# Patient Record
Sex: MALE | Race: ASIAN | NOT HISPANIC OR LATINO | Employment: FULL TIME | URBAN - METROPOLITAN AREA
[De-identification: names, ages, dates, MRNs, and addresses within clinical notes are randomized per-mention and may not be internally consistent; named-entity substitution may affect disease eponyms.]

---

## 2019-02-17 ENCOUNTER — HOSPITAL ENCOUNTER (EMERGENCY)
Facility: HOSPITAL | Age: 29
Discharge: HOME | End: 2019-02-17
Attending: EMERGENCY MEDICINE
Payer: COMMERCIAL

## 2019-02-17 ENCOUNTER — OFFICE VISIT (OUTPATIENT)
Dept: URGENT CARE | Facility: CLINIC | Age: 29
End: 2019-02-17
Payer: COMMERCIAL

## 2019-02-17 VITALS
DIASTOLIC BLOOD PRESSURE: 90 MMHG | HEART RATE: 95 BPM | WEIGHT: 163 LBS | BODY MASS INDEX: 27.16 KG/M2 | TEMPERATURE: 98 F | RESPIRATION RATE: 19 BRPM | SYSTOLIC BLOOD PRESSURE: 137 MMHG | HEIGHT: 65 IN | OXYGEN SATURATION: 95 %

## 2019-02-17 VITALS
SYSTOLIC BLOOD PRESSURE: 113 MMHG | OXYGEN SATURATION: 95 % | DIASTOLIC BLOOD PRESSURE: 63 MMHG | HEART RATE: 91 BPM | RESPIRATION RATE: 18 BRPM | TEMPERATURE: 99 F

## 2019-02-17 DIAGNOSIS — K06.8 GINGIVAL BLEEDING: Primary | ICD-10-CM

## 2019-02-17 LAB
BASOPHILS # BLD AUTO: 0.04 K/UL
BASOPHILS NFR BLD: 0.2 %
DIFFERENTIAL METHOD: ABNORMAL
EOSINOPHIL # BLD AUTO: 0.1 K/UL
EOSINOPHIL NFR BLD: 0.5 %
ERYTHROCYTE [DISTWIDTH] IN BLOOD BY AUTOMATED COUNT: 12.7 %
HCT VFR BLD AUTO: 42.2 %
HGB BLD-MCNC: 14 G/DL
IMM GRANULOCYTES # BLD AUTO: 0.06 K/UL
IMM GRANULOCYTES NFR BLD AUTO: 0.4 %
INR PPP: 1
LYMPHOCYTES # BLD AUTO: 1.7 K/UL
LYMPHOCYTES NFR BLD: 10.1 %
MCH RBC QN AUTO: 29.7 PG
MCHC RBC AUTO-ENTMCNC: 33.2 G/DL
MCV RBC AUTO: 89 FL
MONOCYTES # BLD AUTO: 0.9 K/UL
MONOCYTES NFR BLD: 5.4 %
NEUTROPHILS # BLD AUTO: 13.8 K/UL
NEUTROPHILS NFR BLD: 83.4 %
NRBC BLD-RTO: 0 /100 WBC
PLATELET # BLD AUTO: 274 K/UL
PMV BLD AUTO: 9.9 FL
PROTHROMBIN TIME: 10.5 SEC
RBC # BLD AUTO: 4.72 M/UL
WBC # BLD AUTO: 16.58 K/UL

## 2019-02-17 PROCEDURE — 99284 EMERGENCY DEPT VISIT MOD MDM: CPT | Mod: 25

## 2019-02-17 PROCEDURE — 85025 COMPLETE CBC W/AUTO DIFF WBC: CPT

## 2019-02-17 PROCEDURE — 99284 EMERGENCY DEPT VISIT MOD MDM: CPT | Mod: ,,, | Performed by: EMERGENCY MEDICINE

## 2019-02-17 PROCEDURE — 3008F BODY MASS INDEX DOCD: CPT | Mod: CPTII,S$GLB,, | Performed by: NURSE PRACTITIONER

## 2019-02-17 PROCEDURE — 99284 PR EMERGENCY DEPT VISIT,LEVEL IV: ICD-10-PCS | Mod: ,,, | Performed by: EMERGENCY MEDICINE

## 2019-02-17 PROCEDURE — 96360 HYDRATION IV INFUSION INIT: CPT

## 2019-02-17 PROCEDURE — 3008F PR BODY MASS INDEX (BMI) DOCUMENTED: ICD-10-PCS | Mod: CPTII,S$GLB,, | Performed by: NURSE PRACTITIONER

## 2019-02-17 PROCEDURE — 85610 PROTHROMBIN TIME: CPT

## 2019-02-17 PROCEDURE — 25000003 PHARM REV CODE 250: Performed by: PHYSICIAN ASSISTANT

## 2019-02-17 PROCEDURE — 99203 OFFICE O/P NEW LOW 30 MIN: CPT | Mod: S$GLB,,, | Performed by: NURSE PRACTITIONER

## 2019-02-17 PROCEDURE — 99203 PR OFFICE/OUTPT VISIT, NEW, LEVL III, 30-44 MIN: ICD-10-PCS | Mod: S$GLB,,, | Performed by: NURSE PRACTITIONER

## 2019-02-17 RX ORDER — ACETAMINOPHEN 325 MG/1
650 TABLET ORAL
Status: COMPLETED | OUTPATIENT
Start: 2019-02-17 | End: 2019-02-17

## 2019-02-17 RX ORDER — PENICILLIN V POTASSIUM 250 MG/1
500 TABLET, FILM COATED ORAL
Status: COMPLETED | OUTPATIENT
Start: 2019-02-17 | End: 2019-02-17

## 2019-02-17 RX ORDER — PENICILLIN V POTASSIUM 500 MG/1
500 TABLET, FILM COATED ORAL 4 TIMES DAILY
Qty: 28 TABLET | Refills: 0 | Status: SHIPPED | OUTPATIENT
Start: 2019-02-17 | End: 2019-02-24

## 2019-02-17 RX ORDER — PENICILLIN V POTASSIUM 250 MG/1
250 TABLET, FILM COATED ORAL
Status: DISCONTINUED | OUTPATIENT
Start: 2019-02-17 | End: 2019-02-17

## 2019-02-17 RX ADMIN — PENICILLIN V POTASIUM 500 MG: 250 TABLET ORAL at 05:02

## 2019-02-17 RX ADMIN — ACETAMINOPHEN 650 MG: 325 TABLET ORAL at 05:02

## 2019-02-17 RX ADMIN — SODIUM CHLORIDE 1000 ML: 0.9 INJECTION, SOLUTION INTRAVENOUS at 03:02

## 2019-02-17 NOTE — ED NOTES
LOC: The patient is awake and alert; oriented x 3 and speaking appropriately.  APPEARANCE: Patient resting comfortably, patient is clean and well groomed, gums bleeding bright red blood.   SKIN: warm and dry, normal skin turgor & moist mucus membranes, skin intact, no breakdown noted.  MUSCULOSKELETAL: Patient moving all extremities well, no obvious swelling or deformities noted  RESPIRATORY: Airway is open and patent, ; respirations are spontaneous, normal effort and rate  CARDIAC: Patient has a normal rate, no peripheral edema noted, capillary refill < 3 seconds; No complaints of chest pain   ABDOMEN: Soft and non tender to palpation, no distention noted.

## 2019-02-17 NOTE — PROGRESS NOTES
"Subjective:       Patient ID: Phoenix Jones is a 28 y.o. male.    Vitals:  height is 5' 5" (1.651 m) and weight is 73.9 kg (163 lb). His axillary temperature is 97.5 °F (36.4 °C). His blood pressure is 137/90 (abnormal) and his pulse is 95. His respiration is 19 and oxygen saturation is 95%.     Chief Complaint: Oral Swelling    28 year old male presents to clinic today with complaints gum bleeding. He states that he had his teeth "deep cleaned". Woke up on Saturday with oral bleeding from one tooth that has been uncontrolled. He reports going to  ER last night and states his blood was drawn and told it was normal. He presents today because he is still bleeding. He denies any lightheadedness, dizziness, or feelings of syncope.     He is accompanied by his girlfriend today.     He denies any fever or chills while at home.       Oral Pain    This is a new problem. The current episode started yesterday. The problem occurs constantly. The problem has been gradually worsening. The patient is experiencing no pain. Associated symptoms include oral bleeding. Pertinent negatives include no fever. He has tried nothing for the symptoms. The treatment provided no relief.       Constitution: Negative for chills, fatigue and fever.   HENT: Negative for congestion and sore throat.    Neck: Negative for painful lymph nodes.   Cardiovascular: Negative for chest pain and leg swelling.   Eyes: Negative for double vision and blurred vision.   Respiratory: Negative for cough and shortness of breath.    Gastrointestinal: Negative for nausea, vomiting and diarrhea.   Genitourinary: Negative for dysuria, frequency and urgency.   Musculoskeletal: Negative for joint pain, joint swelling, muscle cramps and muscle ache.   Skin: Negative for color change, pale and rash.   Allergic/Immunologic: Negative for seasonal allergies.   Neurological: Negative for dizziness, history of vertigo, light-headedness, passing out and headaches. "   Hematologic/Lymphatic: Negative for swollen lymph nodes, easy bruising/bleeding and history of blood clots. Does not bruise/bleed easily.   Psychiatric/Behavioral: Negative for nervous/anxious, sleep disturbance and depression. The patient is not nervous/anxious.        Objective:      Physical Exam   Constitutional: He is oriented to person, place, and time. He appears well-developed and well-nourished. He is cooperative.  Non-toxic appearance. He does not appear ill. No distress.   HENT:   Head: Normocephalic and atraumatic.   Right Ear: Hearing, tympanic membrane, external ear and ear canal normal.   Left Ear: Hearing, tympanic membrane, external ear and ear canal normal.   Nose: Nose normal. No mucosal edema, rhinorrhea or nasal deformity. No epistaxis. Right sinus exhibits no maxillary sinus tenderness and no frontal sinus tenderness. Left sinus exhibits no maxillary sinus tenderness and no frontal sinus tenderness.   Mouth/Throat: Uvula is midline, oropharynx is clear and moist and mucous membranes are normal. No trismus in the jaw. Normal dentition. No uvula swelling. No posterior oropharyngeal erythema.       Gingivial swelling with moderate amount of blood in oral cavity. See attached photo.    Eyes: Conjunctivae and lids are normal. Right eye exhibits no discharge. Left eye exhibits no discharge. No scleral icterus.   Sclera clear bilat   Neck: Trachea normal, normal range of motion, full passive range of motion without pain and phonation normal. Neck supple.   Cardiovascular: Normal rate, regular rhythm, normal heart sounds, intact distal pulses and normal pulses.   Pulmonary/Chest: Effort normal and breath sounds normal. No respiratory distress.   Abdominal: Soft. Normal appearance and bowel sounds are normal. He exhibits no distension, no pulsatile midline mass and no mass. There is no tenderness.   Musculoskeletal: Normal range of motion. He exhibits no edema or deformity.   Neurological: He is alert  and oriented to person, place, and time. He exhibits normal muscle tone. Coordination normal.   Skin: Skin is warm, dry and intact. He is not diaphoretic. No pallor.   Psychiatric: He has a normal mood and affect. His speech is normal and behavior is normal. Judgment and thought content normal. Cognition and memory are normal.   Nursing note and vitals reviewed.            Assessment:       1. Gingival bleeding        Plan:           Discussed with patient that if he develops any lightheadedness, dizziness or feelings of syncope he should go immediately to the emergency department   otherwise he needs to follow up with his dentist tomorrow.    Gingival bleeding            Patient Instructions     FOLLOW UP WITH YOUR DENTIST TOMORROW.   AVOID ASPIRIN AND NSAIDS.   DO SALT WATER GARGLES    You must understand that you've received an Urgent Care treatment only and that you may be released before all your medical problems are known or treated. You, the patient, will arrange for follow up care as instructed.  If your condition worsens we recommend that you receive another evaluation at the emergency room immediately or contact your primary medical clinics after hours call service to discuss your concerns.  Please return here or go to the Emergency Department for any concerns or worsening of condition.      Understanding Gingivitis  Gingivitis is a type of gum disease. It is an inflammation of the gums that causes redness and swelling. Its most often caused by infection from bacteria on the teeth. A severe infection can cause small painful sores on the gums, bad breath, and bleeding gums. If untreated, gingivitis can lead to periodontal disease. Over time, this can cause tooth loss.  What causes gingivitis?  Gingivitis is most often caused when plaque builds up on your teeth. Plaque is a sticky film made of bacteria and other substances that coats your teeth. Brushing and flossing helps remove plaque. If you dont brush  and floss regularly, plaque can build up and lead to gingivitis.  You are more likely to have gingivitis if you:  · Dont brush or floss regularly  · Smoke or chew tobacco  · Are pregnant  · Have diabetes  · Use medicines such as birth control pills, steroids, drugs used to treat epilepsy, or cancer drugs  · Have family members who tend to get gingivitis  Signs and symptoms  You may have gingivitis if your gums have areas that:  · Are swollen  · Are bright red or dusky red  · Bleed easily  · Are sore  Treating gingivitis  · See your dentist. He or she may clean your teeth and remove buildup on your teeth of plaque and tartar. Tartar is a mixture of plaque and minerals that forms into a hard substance.  · Use an antiseptic mouth rinse if your dentist tells you to.  · Take antibiotics and other medicines exactly as directed. Dont stop taking them when your symptoms go away.  · Make sure you brush at least twice a day, and use dental floss at least once a day. This will help remove plaque from your teeth.  · Eat a soft diet, if needed, to ease discomfort. Avoid food and beverages that may cause more discomfort in your gums. These include citrus juices such as orange juice and lemonade, and salty or spicy foods.  · Use acetaminophen or ibuprofen for pain or fever. If you have liver or kidney disease or ever had a stomach ulcer or gastrointestinaI bleeding, talk with your healthcare provider before using these medicines.  When to call the healthcare provider  Call your healthcare provider if you have:  · Pain that doesnt go away or gets worse  · Gums that have pulled away from your teeth  · A bad taste in your mouth that wont go away  · Teeth that become loose  · Inability to eat or drink because of mouth pain      Date Last Reviewed: 9/19/2015  © 4642-5895 [employer]. [address] [place] [address], [place] [address]. All rights reserved. This information is not intended as a substitute for professional  medical care. Always follow your healthcare professional's instructions.

## 2019-02-17 NOTE — ED TRIAGE NOTES
Had a dental cleaning  2 days ago and gums continue to bleed. Denies rectal bleeding. Feels weak. Bleeding began the day after the cleaning. Seen at [facility] ER  Last night. STates his labs were fine and he was given a shot in his  gum . Given a packet of hemostat to apply to his gums but states it is not working.

## 2019-02-17 NOTE — PATIENT INSTRUCTIONS
FOLLOW UP WITH YOUR DENTIST TOMORROW.   AVOID ASPIRIN AND NSAIDS.   DO SALT WATER GARGLES    You must understand that you've received an Urgent Care treatment only and that you may be released before all your medical problems are known or treated. You, the patient, will arrange for follow up care as instructed.  If your condition worsens we recommend that you receive another evaluation at the emergency room immediately or contact your primary medical clinics after hours call service to discuss your concerns.  Please return here or go to the Emergency Department for any concerns or worsening of condition.      Understanding Gingivitis  Gingivitis is a type of gum disease. It is an inflammation of the gums that causes redness and swelling. Its most often caused by infection from bacteria on the teeth. A severe infection can cause small painful sores on the gums, bad breath, and bleeding gums. If untreated, gingivitis can lead to periodontal disease. Over time, this can cause tooth loss.  What causes gingivitis?  Gingivitis is most often caused when plaque builds up on your teeth. Plaque is a sticky film made of bacteria and other substances that coats your teeth. Brushing and flossing helps remove plaque. If you dont brush and floss regularly, plaque can build up and lead to gingivitis.  You are more likely to have gingivitis if you:  · Dont brush or floss regularly  · Smoke or chew tobacco  · Are pregnant  · Have diabetes  · Use medicines such as birth control pills, steroids, drugs used to treat epilepsy, or cancer drugs  · Have family members who tend to get gingivitis  Signs and symptoms  You may have gingivitis if your gums have areas that:  · Are swollen  · Are bright red or dusky red  · Bleed easily  · Are sore  Treating gingivitis  · See your dentist. He or she may clean your teeth and remove buildup on your teeth of plaque and tartar. Tartar is a mixture of plaque and minerals that forms into a hard  substance.  · Use an antiseptic mouth rinse if your dentist tells you to.  · Take antibiotics and other medicines exactly as directed. Dont stop taking them when your symptoms go away.  · Make sure you brush at least twice a day, and use dental floss at least once a day. This will help remove plaque from your teeth.  · Eat a soft diet, if needed, to ease discomfort. Avoid food and beverages that may cause more discomfort in your gums. These include citrus juices such as orange juice and lemonade, and salty or spicy foods.  · Use acetaminophen or ibuprofen for pain or fever. If you have liver or kidney disease or ever had a stomach ulcer or gastrointestinaI bleeding, talk with your healthcare provider before using these medicines.  When to call the healthcare provider  Call your healthcare provider if you have:  · Pain that doesnt go away or gets worse  · Gums that have pulled away from your teeth  · A bad taste in your mouth that wont go away  · Teeth that become loose  · Inability to eat or drink because of mouth pain      Date Last Reviewed: 9/19/2015 © 2000-2017 [employer]. [address] [place] [address], [place] [address]. All rights reserved. This information is not intended as a substitute for professional medical care. Always follow your healthcare professional's instructions.

## 2019-02-17 NOTE — ED PROVIDER NOTES
Encounter Date: 2/17/2019       History     Chief Complaint   Patient presents with    Dental Problem     Pt states that he had teeth cleaned on Friday.  Area has been bleeding since.  Pt was seen at  yesterday.     This is a 28-year-old male with no known significant past medical history who presents to the ED with a chief complaint of gingival bleeding following a dental procedure.  Patient states that he went for a deep cleaning 2 days ago.  After he was discharged from the dental office he developed gingival bleeding.  He presented to an outside hospital and was treated with an injection, which he says initially stopped the bleeding.  About an hour and a half after he returned home the bleeding recurred and has been persistent over the last 24 hr.  Patient is feeling generally weak.  He denies any prior history of bleeding disorders, hemarthrosis, epistaxis, hematuria or BRBPR. He is not on anticoagulants.           Review of patient's allergies indicates:  No Known Allergies  History reviewed. No pertinent past medical history.  History reviewed. No pertinent surgical history.  History reviewed. No pertinent family history.  Social History     Tobacco Use    Smoking status: Never Smoker    Smokeless tobacco: Never Used   Substance Use Topics    Alcohol use: No     Frequency: Never    Drug use: No     Review of Systems   Constitutional: Negative for chills and fever.   HENT: Positive for dental problem. Negative for sore throat.    Respiratory: Negative for shortness of breath.    Cardiovascular: Negative for chest pain.   Gastrointestinal: Negative for nausea and vomiting.   Genitourinary: Negative for dysuria.   Musculoskeletal: Negative for back pain.   Skin: Negative for rash.   Neurological: Negative for weakness.   Hematological: Bruises/bleeds easily.       Physical Exam     Initial Vitals [02/17/19 1246]   BP Pulse Resp Temp SpO2   (!) 182/78 82 18 100 °F (37.8 °C) 98 %      MAP       --          Physical Exam    Constitutional: He appears well-developed and well-nourished. No distress.   HENT:   Head: Atraumatic.   Mouth/Throat:       Eyes: Conjunctivae and EOM are normal. Pupils are equal, round, and reactive to light.   Cardiovascular: Normal rate, regular rhythm and normal heart sounds.   Pulmonary/Chest: Breath sounds normal. No respiratory distress. He has no wheezes. He has no rhonchi.   Abdominal: Soft. Bowel sounds are normal. There is no tenderness.   Neurological: He is alert and oriented to person, place, and time.   Skin: Skin is warm and dry. No rash noted.         ED Course   Procedures  Labs Reviewed   CBC W/ AUTO DIFFERENTIAL - Abnormal; Notable for the following components:       Result Value    WBC 16.58 (*)     Gran # (ANC) 13.8 (*)     Immature Grans (Abs) 0.06 (*)     Gran% 83.4 (*)     Lymph% 10.1 (*)     All other components within normal limits   PROTIME-INR          Imaging Results    None                APC / Resident Notes:   28 year old male presenting with gingival bleeding following dental cleaning.  On exam he has focal bleeding from the gingiva, as noted. Initially pressure was held to the area x 10 minutes without significant improvement.  A hemostatic dressing was packed into the area of concern, and pressure was held again for an additional 10 minutes. We were able to achieve hemostasis. The patient's vitals are hemodynamically stable and his H&H is within normal limits. He developed a fever and was noted to have leukocytosis on labs. He will be started on antibiotics to cover developing dental infection. No appreciable abscesses on exam. Patient understands the importance of prompt f/u with his dentist. There is no active bleeding on exam, vitals are stable. Discussed soft diet. He is stable for discharge. I discussed the care of this patient with my supervising MD.          Attending Attestation:     Physician Attestation Statement for NP/PA:   I have conducted a  face to face encounter with this patient in addition to the NP/PA, due to Medical Complexity    Other NP/PA Attestation Additions:    History of Present Illness: 29 y/o healthy M with c/o oral bleeding since dental cleaning yesterday. Seen at OSH and by report tx with block at OSH yesterday but continued bleeding.   No h/o easy bruising or other c/o easy bruising or bleeding.   Physical Exam: Minimal oozing from inner side of tooth 11, no swelling, no erythema   Medical Decision Making: Compression and dental hemcon  reassess                    Clinical Impression:   The encounter diagnosis was Gingival bleeding.      Disposition:   Disposition: Discharged  Condition: Stable                        Britt Cook PA-C  02/17/19 2100       Ivana Nation MD  02/18/19 [id number]

## 2020-05-11 ENCOUNTER — HOSPITAL ENCOUNTER (EMERGENCY)
Facility: HOSPITAL | Age: 30
Discharge: HOME | End: 2020-05-11
Attending: EMERGENCY MEDICINE
Payer: COMMERCIAL

## 2020-05-11 VITALS
OXYGEN SATURATION: 98 % | BODY MASS INDEX: 30.02 KG/M2 | HEART RATE: 87 BPM | RESPIRATION RATE: 18 BRPM | DIASTOLIC BLOOD PRESSURE: 85 MMHG | WEIGHT: 159 LBS | TEMPERATURE: 99 F | HEIGHT: 61 IN | SYSTOLIC BLOOD PRESSURE: 127 MMHG

## 2020-05-11 DIAGNOSIS — R53.1 WEAKNESS: Primary | ICD-10-CM

## 2020-05-11 LAB
ALBUMIN SERPL BCP-MCNC: 4.9 G/DL (ref 3.5–5.2)
ALP SERPL-CCNC: 77 U/L (ref 55–135)
ALT SERPL W/O P-5'-P-CCNC: 42 U/L (ref 10–44)
ANION GAP SERPL CALC-SCNC: 12 MMOL/L (ref 8–16)
AST SERPL-CCNC: 25 U/L (ref 10–40)
BASOPHILS # BLD AUTO: 0.02 K/UL (ref 0–0.2)
BASOPHILS NFR BLD: 0.2 % (ref 0–1.9)
BILIRUB SERPL-MCNC: 0.6 MG/DL (ref 0.1–1)
BUN SERPL-MCNC: 16 MG/DL (ref 6–20)
BUN SERPL-MCNC: 18 MG/DL (ref 6–30)
CALCIUM SERPL-MCNC: 9.6 MG/DL (ref 8.7–10.5)
CHLORIDE SERPL-SCNC: 101 MMOL/L (ref 95–110)
CHLORIDE SERPL-SCNC: 102 MMOL/L (ref 95–110)
CO2 SERPL-SCNC: 23 MMOL/L (ref 23–29)
CREAT SERPL-MCNC: 0.9 MG/DL (ref 0.5–1.4)
CREAT SERPL-MCNC: 0.9 MG/DL (ref 0.5–1.4)
DIFFERENTIAL METHOD: NORMAL
EOSINOPHIL # BLD AUTO: 0.2 K/UL (ref 0–0.5)
EOSINOPHIL NFR BLD: 1.9 % (ref 0–8)
ERYTHROCYTE [DISTWIDTH] IN BLOOD BY AUTOMATED COUNT: 12.7 % (ref 11.5–14.5)
EST. GFR  (AFRICAN AMERICAN): >60 ML/MIN/1.73 M^2
EST. GFR  (NON AFRICAN AMERICAN): >60 ML/MIN/1.73 M^2
GLUCOSE SERPL-MCNC: 100 MG/DL (ref 70–110)
GLUCOSE SERPL-MCNC: 95 MG/DL (ref 70–110)
HCT VFR BLD AUTO: 47.9 % (ref 40–54)
HCT VFR BLD CALC: 48 %PCV (ref 36–54)
HGB BLD-MCNC: 15.6 G/DL (ref 14–18)
IMM GRANULOCYTES # BLD AUTO: 0.02 K/UL (ref 0–0.04)
IMM GRANULOCYTES NFR BLD AUTO: 0.2 % (ref 0–0.5)
LIPASE SERPL-CCNC: 24 U/L (ref 4–60)
LYMPHOCYTES # BLD AUTO: 2.9 K/UL (ref 1–4.8)
LYMPHOCYTES NFR BLD: 32.2 % (ref 18–48)
MAGNESIUM SERPL-MCNC: 2.2 MG/DL (ref 1.6–2.6)
MCH RBC QN AUTO: 29.4 PG (ref 27–31)
MCHC RBC AUTO-ENTMCNC: 32.6 G/DL (ref 32–36)
MCV RBC AUTO: 90 FL (ref 82–98)
MONOCYTES # BLD AUTO: 0.5 K/UL (ref 0.3–1)
MONOCYTES NFR BLD: 5.8 % (ref 4–15)
NEUTROPHILS # BLD AUTO: 5.3 K/UL (ref 1.8–7.7)
NEUTROPHILS NFR BLD: 59.7 % (ref 38–73)
NRBC BLD-RTO: 0 /100 WBC
PLATELET # BLD AUTO: 291 K/UL (ref 150–350)
PMV BLD AUTO: 10 FL (ref 9.2–12.9)
POC IONIZED CALCIUM: 1.16 MMOL/L (ref 1.06–1.42)
POC TCO2 (MEASURED): 26 MMOL/L (ref 23–29)
POTASSIUM BLD-SCNC: 3.5 MMOL/L (ref 3.5–5.1)
POTASSIUM SERPL-SCNC: 3.5 MMOL/L (ref 3.5–5.1)
PROT SERPL-MCNC: 9 G/DL (ref 6–8.4)
RBC # BLD AUTO: 5.31 M/UL (ref 4.6–6.2)
SAMPLE: NORMAL
SARS-COV-2 RDRP RESP QL NAA+PROBE: NEGATIVE
SODIUM BLD-SCNC: 140 MMOL/L (ref 136–145)
SODIUM SERPL-SCNC: 137 MMOL/L (ref 136–145)
TROPONIN I SERPL DL<=0.01 NG/ML-MCNC: <0.006 NG/ML (ref 0–0.03)
WBC # BLD AUTO: 8.86 K/UL (ref 3.9–12.7)

## 2020-05-11 PROCEDURE — 93005 ELECTROCARDIOGRAM TRACING: CPT

## 2020-05-11 PROCEDURE — 84484 ASSAY OF TROPONIN QUANT: CPT

## 2020-05-11 PROCEDURE — 25000003 PHARM REV CODE 250: Performed by: PHYSICIAN ASSISTANT

## 2020-05-11 PROCEDURE — U0002 COVID-19 LAB TEST NON-CDC: HCPCS

## 2020-05-11 PROCEDURE — 93010 ELECTROCARDIOGRAM REPORT: CPT | Mod: ,,, | Performed by: INTERNAL MEDICINE

## 2020-05-11 PROCEDURE — 83690 ASSAY OF LIPASE: CPT

## 2020-05-11 PROCEDURE — 85025 COMPLETE CBC W/AUTO DIFF WBC: CPT

## 2020-05-11 PROCEDURE — 96360 HYDRATION IV INFUSION INIT: CPT

## 2020-05-11 PROCEDURE — 83735 ASSAY OF MAGNESIUM: CPT

## 2020-05-11 PROCEDURE — 93010 EKG 12-LEAD: ICD-10-PCS | Mod: ,,, | Performed by: INTERNAL MEDICINE

## 2020-05-11 PROCEDURE — 80047 BASIC METABLC PNL IONIZED CA: CPT

## 2020-05-11 PROCEDURE — 80053 COMPREHEN METABOLIC PANEL: CPT

## 2020-05-11 PROCEDURE — 99285 PR EMERGENCY DEPT VISIT,LEVEL V: ICD-10-PCS | Mod: ,,, | Performed by: PHYSICIAN ASSISTANT

## 2020-05-11 PROCEDURE — 99285 EMERGENCY DEPT VISIT HI MDM: CPT | Mod: ,,, | Performed by: PHYSICIAN ASSISTANT

## 2020-05-11 PROCEDURE — 99285 EMERGENCY DEPT VISIT HI MDM: CPT | Mod: 25

## 2020-05-11 RX ORDER — ACETAMINOPHEN 500 MG
1000 TABLET ORAL
Status: COMPLETED | OUTPATIENT
Start: 2020-05-11 | End: 2020-05-11

## 2020-05-11 RX ORDER — ONDANSETRON 4 MG/1
4 TABLET, ORALLY DISINTEGRATING ORAL
Status: COMPLETED | OUTPATIENT
Start: 2020-05-11 | End: 2020-05-11

## 2020-05-11 RX ADMIN — ACETAMINOPHEN 1000 MG: 500 TABLET ORAL at 02:05

## 2020-05-11 RX ADMIN — ONDANSETRON 4 MG: 4 TABLET, ORALLY DISINTEGRATING ORAL at 02:05

## 2020-05-11 RX ADMIN — SODIUM CHLORIDE 1000 ML: 0.9 INJECTION, SOLUTION INTRAVENOUS at 02:05

## 2020-05-11 NOTE — ED PROVIDER NOTES
Encounter Date: 5/11/2020       History     Chief Complaint   Patient presents with    Multiple Complaints     over the past 3-4 days been having dizziness and weakness, nausea beginning today with some CP     Patient is a 29 year old male with no significant PMHx. He presents to the ED for weakness. He reports having generalized weakness for approximately five days. Reports associated dizziness, nausea, and chest pain. Describes chest pain as intermittent and pressure. Denies pain at this time. Reports pain worse with eating. Denies hx of MI or cardiac stents. Denies hx of anticoagulation. He denies fever,chills, vomiting, sob, abd pain, dysuria, diarrhea, or constipation. He is a current everyday smoker and denies alcohol use.    The history is provided by the patient and medical records. The history is limited by a language barrier (patient prefers to speak Mandarin. ). No  was used (patient refuses  at this time. patient insists to proceed with English).     Review of patient's allergies indicates:  No Known Allergies  History reviewed. No pertinent past medical history.  History reviewed. No pertinent surgical history.  History reviewed. No pertinent family history.  Social History     Tobacco Use    Smoking status: Light Tobacco Smoker    Smokeless tobacco: Never Used   Substance Use Topics    Alcohol use: No     Frequency: Never     Drinks per session: Patient refused     Binge frequency: Never    Drug use: No     Review of Systems   Constitutional: Negative for fever.   HENT: Negative for sore throat.    Respiratory: Negative for shortness of breath.    Cardiovascular: Positive for chest pain.   Gastrointestinal: Positive for nausea. Negative for abdominal pain and vomiting.   Genitourinary: Negative for dysuria.   Musculoskeletal: Negative for back pain.   Skin: Negative for rash.   Neurological: Positive for dizziness and weakness.   Hematological: Does not bruise/bleed  easily.       Physical Exam     Initial Vitals [05/11/20 0202]   BP Pulse Resp Temp SpO2   (!) 147/88 95 16 98.8 °F (37.1 °C) 98 %      MAP       --         Physical Exam    Vitals reviewed.  Constitutional: He appears well-developed and well-nourished. No distress.   HENT:   Head: Normocephalic.   Eyes: Conjunctivae and EOM are normal. Pupils are equal, round, and reactive to light.   Neck: Normal range of motion.   Cardiovascular: Normal rate and regular rhythm.   No murmur heard.  Pulmonary/Chest: Breath sounds normal. No respiratory distress. He has no wheezes. He has no rales.   Abdominal: Soft. Bowel sounds are normal. He exhibits no distension. There is no tenderness.   Musculoskeletal: Normal range of motion.   Neurological: He is alert and oriented to person, place, and time. He has normal strength. No sensory deficit. Coordination and gait normal.   Motor strength of b/l UE and LE 5/5. Finger to nose normal. Heel to shin normal. No Pronator drift. No facial droop or asymmetry. Speech is clear. Tongue protrudes midline with no fasciculations.   Skin: Skin is warm and dry. No erythema.         ED Course   Procedures  Labs Reviewed   COMPREHENSIVE METABOLIC PANEL - Abnormal; Notable for the following components:       Result Value    Total Protein 9.0 (*)     All other components within normal limits   SARS-COV-2 RNA AMPLIFICATION, QUAL    Narrative:     What symptom criteria does the patient meet?->Muscle pain   CBC W/ AUTO DIFFERENTIAL   LIPASE   TROPONIN I   MAGNESIUM   ISTAT PROCEDURE        ECG Results          EKG 12-lead (In process)  Result time 05/11/20 07:47:59    In process by Interface, Lab In [facility] (05/11/20 07:47:59)                 Narrative:    Test Reason : R53.1,    Vent. Rate : 082 BPM     Atrial Rate : 082 BPM     P-R Int : 128 ms          QRS Dur : 104 ms      QT Int : 372 ms       P-R-T Axes : 064 062 040 degrees     QTc Int : 434 ms    Normal sinus rhythm  Normal ECG  No previous ECGs  available    Referred By: [email or web address]   SELF           Confirmed By:                             Imaging Results          X-Ray Chest AP Portable (Final result)  Result time 05/11/20 02:59:21    Final result by Wolfgang Light MD (05/11/20 02:59:21)                 Impression:      No acute findings in the chest.      Electronically signed by: Wolfgang Light MD  Date:    05/11/2020  Time:    02:59             Narrative:    EXAMINATION:  XR CHEST AP PORTABLE    CLINICAL HISTORY:  Suspected Covid-19 Virus Infection;    TECHNIQUE:  Single frontal view of the chest was performed.    COMPARISON:  None    FINDINGS:  No consolidation, pleural effusion or pneumothorax.    Cardiomediastinal silhouette is unremarkable.                                 Medical Decision Making:   History:   Old Medical Records: I decided to obtain old medical records.  Independently Interpreted Test(s):   I have ordered and independently interpreted X-rays - see summary below.  Clinical Tests:   Lab Tests: Ordered and Reviewed  Radiological Study: Ordered and Reviewed  Medical Tests: Ordered and Reviewed       APC / Resident Notes:   Patient is a 29 year old male presents to the ED for emergent evaluation of weakness.     Will order labs and imaging. Will order antiemetic and pain medication for symptomatic relief. Will continue to monitor.     Differential diagnoses include, but are not limited to: COVID, peptic ulcer disease, pancreatitis, cardiac arrhythmia, or electrolyte imbalance.     Patient evaluated during the corona virus pandemic. They are COVID negative. No leukocytosis. hemodynamically stable, without increased work of breathing. Troponin WNL CXR found to have no acute findings in the chest. Patient was counseled regarding respiratory supportive care measures. Patient advised to self quarantine, wear mask in public. I have discussed emergency department findings, and plan with the patient. Will discharge home with F/U with PCP.  Patient verbalizes understanding of plan and agrees. Return precautions given.    I have discussed and reviewed with my supervising physician.        Clinical Impression:       ICD-10-CM ICD-9-CM   1. Weakness R53.1 780.79         Disposition:   Disposition: Discharged  Condition: Stable     ED Disposition Condition    Discharge Stable        ED Prescriptions     None        Follow-up Information     Follow up With Specialties Details Why Contact Info Additional Information    [facility] - Internal Medicine Internal Medicine Schedule an appointment as soon as possible for a visit   [address]  [facility] [address] [phone] [facility] Bldg.                                     Felicia Yoder PA-C  05/11/20 0800

## 2020-05-11 NOTE — ED TRIAGE NOTES
Patient complaining of bilateral arm weakness x3-4 days, dizziness, nausea, and chest pressure. Denies SOB, fevers, cough, v/d.

## 2020-07-08 ENCOUNTER — HOSPITAL ENCOUNTER (EMERGENCY)
Facility: HOSPITAL | Age: 30
Discharge: HOME | End: 2020-07-08
Attending: EMERGENCY MEDICINE
Payer: COMMERCIAL

## 2020-07-08 VITALS
DIASTOLIC BLOOD PRESSURE: 86 MMHG | HEART RATE: 85 BPM | SYSTOLIC BLOOD PRESSURE: 139 MMHG | HEIGHT: 62 IN | BODY MASS INDEX: 29.08 KG/M2 | TEMPERATURE: 98 F | OXYGEN SATURATION: 96 % | WEIGHT: 158 LBS | RESPIRATION RATE: 18 BRPM

## 2020-07-08 DIAGNOSIS — F41.9 ANXIETY: ICD-10-CM

## 2020-07-08 PROCEDURE — 93010 EKG 12-LEAD: ICD-10-PCS | Mod: ,,, | Performed by: INTERNAL MEDICINE

## 2020-07-08 PROCEDURE — 93005 ELECTROCARDIOGRAM TRACING: CPT

## 2020-07-08 PROCEDURE — 99283 EMERGENCY DEPT VISIT LOW MDM: CPT | Mod: 25

## 2020-07-08 PROCEDURE — 25000003 PHARM REV CODE 250: Performed by: PHYSICIAN ASSISTANT

## 2020-07-08 PROCEDURE — 99284 PR EMERGENCY DEPT VISIT,LEVEL IV: ICD-10-PCS | Mod: ,,, | Performed by: PHYSICIAN ASSISTANT

## 2020-07-08 PROCEDURE — 93010 ELECTROCARDIOGRAM REPORT: CPT | Mod: ,,, | Performed by: INTERNAL MEDICINE

## 2020-07-08 PROCEDURE — 99284 EMERGENCY DEPT VISIT MOD MDM: CPT | Mod: ,,, | Performed by: PHYSICIAN ASSISTANT

## 2020-07-08 RX ORDER — ALPRAZOLAM 0.5 MG/1
0.5 TABLET ORAL
Status: COMPLETED | OUTPATIENT
Start: 2020-07-08 | End: 2020-07-08

## 2020-07-08 RX ORDER — ALPRAZOLAM 0.5 MG/1
0.5 TABLET ORAL 3 TIMES DAILY PRN
Qty: 7 TABLET | Refills: 0 | Status: SHIPPED | OUTPATIENT
Start: 2020-07-08 | End: 2020-11-25

## 2020-07-08 RX ADMIN — ALPRAZOLAM 0.5 MG: 0.5 TABLET ORAL at 04:07

## 2020-07-08 NOTE — ED PROVIDER NOTES
Encounter Date: 7/8/2020       History     Chief Complaint   Patient presents with    Chills     Pt states feeling chills and unable to sleep tonight. Pt states waking up at 2am feeling anxious and nervous. Pt came in with similar problem a month ago but has not resolved.     30-year-old male presents to the ER complaining of insomnia, nausea anxiety.  Patient speaks broken English, primary language is Mandarin.  The patient refuses to use language line  or ARNAUD.  I am able to communicate but the patient but he has to use translation on his phone.    He is otherwise healthy young male.  He presents to the ER stating cannot sleep tonight.  He endorses mild nausea.  He denies any pain.  He reports tightness in his chest pain.  He reports anxiety and stress.  He has never experienced this before.  He was seen here last month with chest pain but states that was different.  He has no pain tonight.  He appears well, nontoxic and nondistressed.        Review of patient's allergies indicates:  No Known Allergies  History reviewed. No pertinent past medical history.  History reviewed. No pertinent surgical history.  History reviewed. No pertinent family history.  Social History     Tobacco Use    Smoking status: Light Tobacco Smoker    Smokeless tobacco: Never Used   Substance Use Topics    Alcohol use: No     Frequency: Never     Drinks per session: Patient refused     Binge frequency: Never    Drug use: No     Review of Systems   Constitutional: Negative for fever.   HENT: Negative for sore throat.    Respiratory: Negative for shortness of breath.    Cardiovascular: Negative for chest pain.   Gastrointestinal: Negative for nausea.   Genitourinary: Negative for dysuria.   Musculoskeletal: Negative for back pain.   Skin: Negative for rash.   Neurological: Negative for weakness.   Hematological: Does not bruise/bleed easily.   Psychiatric/Behavioral:        Anxiety, nausea       Physical Exam     Initial  Vitals [07/08/20 0359]   BP Pulse Resp Temp SpO2   139/86 85 18 98.4 °F (36.9 °C) 96 %      MAP       --         Physical Exam    Constitutional: Vital signs are normal. He appears well-developed and well-nourished.   HENT:   Head: Normocephalic and atraumatic.   Right Ear: Hearing normal.   Left Ear: Hearing normal.   Eyes: Conjunctivae are normal.   Cardiovascular: Normal rate and regular rhythm.   Abdominal: Soft. Normal appearance and bowel sounds are normal.   Musculoskeletal: Normal range of motion.   Neurological: He is alert and oriented to person, place, and time.   Skin: Skin is warm and intact.   Psychiatric: He has a normal mood and affect. His speech is normal and behavior is normal. Cognition and memory are normal.         ED Course   Procedures  Labs Reviewed - No data to display  EKG Readings: (Independently Interpreted)   Normal sinus rhythm, rate 71 no STEMI       Imaging Results    None          Medical Decision Making:   History:   Old Medical Records: I decided to obtain old medical records.  Old Records Summarized: records from clinic visits.  Initial Assessment:   Otherwise healthy 30-year-old male presenting to the ER with anxiety  Differential Diagnosis:   Anxiety, stress, arrhythmia, insomnia  ED Management:  Twelve lead EKG without acute ischemic findings  Otherwise healthy young male with no significant past medical history presenting with symptoms of anxiety  His EKG was nonischemic.  His history is not consistent with cardiac etiology.  I do suspect that her symptoms are secondary to stress, anxiety  Patient advised to limit the use of caffeine late in the evening.  He was offered medication in the ED but did not want to take anything.  He was given a prescription to use p.r.n. and a referral was placed for primary care                                   Clinical Impression:       ICD-10-CM ICD-9-CM   1. Anxiety  F41.9 300.00         Disposition:   Disposition: Discharged  Condition:  Stable     ED Disposition Condition    Discharge Stable        ED Prescriptions     Medication Sig Dispense Start Date End Date Auth. Provider    ALPRAZolam (XANAX) 0.5 MG tablet Take 1 tablet (0.5 mg total) by mouth 3 (three) times daily as needed for Anxiety. 7 tablet 7/8/2020 8/7/2020 Dusty Vallejo PA-C        Follow-up Information     Follow up With Specialties Details Why Contact Info Additional Information    [facility] - Internal Medicine Internal Medicine   [address] [facility] [address] [phone] [facility] B[facility].                                     Dusty Vallejo PA-C  07/08/20 0435

## 2020-11-25 ENCOUNTER — OFFICE VISIT (OUTPATIENT)
Dept: INTERNAL MEDICINE | Facility: CLINIC | Age: 30
End: 2020-11-25
Payer: COMMERCIAL

## 2020-11-25 VITALS
DIASTOLIC BLOOD PRESSURE: 72 MMHG | TEMPERATURE: 98 F | OXYGEN SATURATION: 98 % | WEIGHT: 159.19 LBS | SYSTOLIC BLOOD PRESSURE: 124 MMHG | BODY MASS INDEX: 29.3 KG/M2 | HEART RATE: 84 BPM | HEIGHT: 62 IN | RESPIRATION RATE: 18 BRPM

## 2020-11-25 DIAGNOSIS — Z00.00 WELL ADULT EXAM: Primary | ICD-10-CM

## 2020-11-25 DIAGNOSIS — R53.83 FATIGUE, UNSPECIFIED TYPE: ICD-10-CM

## 2020-11-25 DIAGNOSIS — G47.30 SLEEP APNEA, UNSPECIFIED TYPE: ICD-10-CM

## 2020-11-25 PROCEDURE — 99385 PREV VISIT NEW AGE 18-39: CPT | Mod: S$GLB,,, | Performed by: FAMILY MEDICINE

## 2020-11-25 PROCEDURE — 99385 PR PREVENTIVE VISIT,NEW,18-39: ICD-10-PCS | Mod: S$GLB,,, | Performed by: FAMILY MEDICINE

## 2020-11-25 PROCEDURE — 99999 PR PBB SHADOW E&M-EST. PATIENT-LVL IV: CPT | Mod: PBBFAC,,, | Performed by: FAMILY MEDICINE

## 2020-11-25 PROCEDURE — 99999 PR PBB SHADOW E&M-EST. PATIENT-LVL IV: ICD-10-PCS | Mod: PBBFAC,,, | Performed by: FAMILY MEDICINE

## 2020-11-25 NOTE — PATIENT INSTRUCTIONS
Obstructive Sleep Apnea  Obstructive sleep apnea is a condition that causes your air passages to become narrowed or blocked during sleep. As a result, breathing stops for short periods. Your body wakes up enough for breathing to begin again, though you don't remember it. The cycle of stopped breathing and brief awakenings can repeat dozens of times a night. This prevents the body from getting to the deeper stages of sleep that are needed for good rest and may cause your body's oxygen level to fall.  Signs of sleep apnea include loud snoring, noisy breathing, and gasping sounds during sleep. Daytime symptoms include waking up tired after a full night's sleep, waking up with headaches, feeling very sleepy or falling asleep during the day, and having problems with memory or concentration.  Risk factors for sleep apnea include:  · Being overweight  · Being a man, or a woman in menopause  · Smoking  · Using alcohol or sedating medicines  · Having enlarged structures in the nose or throat  Home care  Lifestyle changes that can help treat snoring and sleep apnea include the following:  · If you are overweight, lose weight. Talk to your healthcare provider about a weight-loss plan for you.  · Avoid alcohol for 3 to 4 hours before bedtime. Avoid sedating medications. Ask your healthcare provider about the medicines you take.  · If you smoke, talk to your healthcare provider about ways to quit.  · Sleep on your side. This can help prevent gravity from pulling relaxed throat tissues into your breathing passages.  · If you have allergies or sinus problems that block your nose, ask your healthcare provider for help.  Follow-up care  Follow up with your healthcare provider, or as advised. A diagnosis of sleep apnea is made with a sleep study. Your healthcare provider can tell you more about this test.  When to seek medical advice  Sleep apnea can make you more likely to have certain health problems. These include high blood  pressure, heart attack, stroke, and sexual dysfunction. If you have sleep apnea, talk to your healthcare provider about the best treatments for you.  Date Last Reviewed: 4/1/2017 © 2000-2017 [employer]. [address] [place] [address], [place] [address]. All rights reserved. This information is not intended as a substitute for professional medical care. Always follow your healthcare professional's instructions.        Understanding Cervical Strain    There are 7 bones (vertebrae) in the neck that are part of the spine. These are called the cervical spine. Cervical strain is a medical term for neck pain. The neck has several layers of muscles. These are connected with tendons to the cervical spine and other bones. Neck pain is often the result of injury to these muscles and tendons.  Causes of cervical strain  Different types of stress on the neck can damage muscles and tendons (soft tissues) and cause cervical strain. Cervical tissues can be damaged by:  · The neck being forced past its normal range of motion, such as in a car accident or sports injury  · Constant, low-level stress, such as from poor posture or a poorly set-up workspace  Symptoms of cervical strain  These may include:  · Neck pain or stiffness  · Pain in the shoulders or upper back  · Muscle spasms  · Headache, often starting at the base of the neck  · Irritability, difficulty concentrating, or sleeplessness  Treatment for cervical strain  This problem often gets better on its own. Treatments aim to reduce pain and inflammation and increase the range of motion of the neck. Possible treatments include:  · Over-the-counter or prescription pain medicine. These help relieve pain and inflammation.  · Stretching exercises to decrease neck stiffness.  · Massage to decrease neck stiffness.  · Cold or heat pack. These help reduce pain and swelling.  Call 911  Call emergency services right away if you have any of these:  · Face drooping or  numbness  · Numbness or weakness, especially in the arms or on one side  · Slurred speech or difficulty speaking  · Blurred vision   When to call your healthcare provider  Call your healthcare provider right away if you have any of these:  · Fever of 100.4°F (38°C) or higher, or as directed  · Pain or stiffness that gets worse  · Symptoms that dont get better, or get worse  · Numbness, tingling, weakness or shooting pains into the arms or legs  · New symptoms  Date Last Reviewed: 3/10/2016  © 5876-1497 [employer]. [address] [place] [address]. All rights reserved. This information is not intended as a substitute for professional medical care. Always follow your healthcare professional's instructions.        Chin Tuck (Posture and Strength)    1. Sit in a chair with your feet flat on the floor, or stand up. Relax your shoulders.  2. Look straight ahead. Gently glide your chin straight back. Its a small movement. Dont tilt your head up or down, or bend your neck forward.  3. Hold for 5 seconds. Then relax.  4. Repeat 5 times, or as instructed.     Tip: Dont arch your back or hunch your shoulders.   Date Last Reviewed: 5/1/2016  © 3754-8571 [employer]ment [employer]. [address] [place] [address]. All rights reserved. This information is not intended as a substitute for professional medical care. Always follow your healthcare professional's instructions.        Shoulder and Upper Back Stretch  To start, stand tall with your ears, shoulders, and hips in line. Your feet should be slightly apart, positioned just under your hips. Focus your eyes directly in front of you.  this position for a few seconds before starting your exercise. This helps increase your awareness of proper posture.          Reach overhead and slightly back with both arms. Keep your shoulders and neck aligned and your elbows behind your shoulders:  · With your palms facing the ceiling, turn your  fingers inward.  · Take a deep breath. Breathe out, and lower your elbows toward your buttocks. Hold for 5 seconds, then return to starting position.  · Repeat 3 times.  Date Last Reviewed: 8/16/2015  © 8591-4276 [employer]. [address] [place] [address]. All rights reserved. This information is not intended as a substitute for professional medical care. Always follow your healthcare professional's instructions.        Shoulder Girdle Stretch    To start, sit in a chair with your feet flat on the floor. Your weight should be slightly forward so that youre balanced evenly on your buttocks. Relax your shoulders and keep your head level. Using a chair with arms may help you keep your balance:  · Place 1 hand on the outside elbow of the other arm.  · Pull the arm across your body. Hold for 30 to 60 seconds. Repeat once.  · Switch sides.  For your safety, check with your healthcare provider before starting an exercise program.   Date Last Reviewed: 8/16/2015 © 2000-2017 [employer]. [address] [place] [address]. All rights reserved. This information is not intended as a substitute for professional medical care. Always follow your healthcare professional's instructions.        Head Tilt / Upper Trapezius Stretch (Flexibility)    5. Sit up straight in a chair with your head and neck in a neutral position, ears in line with shoulders. Hold the edge of your chair seat with your right hand. Tuck your chin in slightly.  6. Tilt your head to the left, while looking straight ahead.  7. Put your left hand on the right side of your head. Gently pull your head to the left. Hold for 30 to 60 seconds. Use gentle pressure to increase the stretch. Dont force your head into position.  8. Return your head and neck to the neutral position.  9. Repeat this exercise 2 times, or as instructed.  10. Switch sides and repeat 2 times, or as instructed.  Challenge yourself  Tuck one end of  a towel under your left arm. Then bring the other end over your right shoulder. Pull the towel down on your right shoulder with both hands as you side-bend your head to the left. Repeat with the other side.   Date Last Reviewed: 3/10/2016  © 8067-3121 [employer] [address] [place] [address], [place] [address]. All rights reserved. This information is not intended as a substitute for professional medical care. Always follow your healthcare professional's instructions.

## 2020-11-25 NOTE — PROGRESS NOTES
Subjective:       Patient ID: Phoenix Jones is a 30 y.o. male.    Chief Complaint: Annual Exam and Establish Care    HPI 30-year-old male presents to clinic today accompanied by his wife to establish care.  He is post broken English as his predominant language is Mandarin.  He refuses  but does on occasion uses phone to translate certain sentences.  He reports significant past medical history, past surgical history, or family medical history.  He declines flu vaccine at this time.  His main complaint today is frequent fatigue, muscle weakness, and on occasion muscle pain which has been ongoing for the past 6 months.  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  Outside of work he denies any further exercise or physical activity.  He reports frequent late night and routinely will go to sleep at late hours but states that even with sleep he frequently awakens exhausted and on occasion will awaken with headaches.  He reports frequent body aches and muscle pain.  He does report snoring.  Review of Systems   Constitutional: Positive for fatigue. Negative for activity change, appetite change, chills, fever and unexpected weight change.   HENT: Negative for nasal congestion, ear pain, hearing loss, postnasal drip, rhinorrhea, sinus pressure/congestion, sore throat, tinnitus and trouble swallowing.    Eyes: Negative for discharge, redness, itching and visual disturbance.   Respiratory: Negative for cough, chest tightness, shortness of breath and wheezing.    Cardiovascular: Negative for chest pain and palpitations.   Gastrointestinal: Negative for abdominal pain, constipation, diarrhea, nausea and vomiting.   Genitourinary: Negative for decreased urine volume, difficulty urinating, dysuria, frequency, hematuria and urgency.   Musculoskeletal: Negative for back pain, myalgias, neck pain and neck  stiffness.   Integumentary:  Negative for rash.   Neurological: Positive for weakness. Negative for dizziness, light-headedness and headaches.   Psychiatric/Behavioral: Positive for sleep disturbance. Negative for dysphoric mood.         Objective:      Physical Exam  Vitals signs and nursing note reviewed.   Constitutional:       General: He is not in acute distress.     Appearance: He is well-developed. He is not diaphoretic.   HENT:      Head: Normocephalic and atraumatic.      Right Ear: External ear normal.      Left Ear: External ear normal.      Nose: Nose normal.      Mouth/Throat:      Pharynx: No oropharyngeal exudate.   Eyes:      General: No scleral icterus.        Right eye: No discharge.         Left eye: No discharge.      Conjunctiva/sclera: Conjunctivae normal.      Pupils: Pupils are equal, round, and reactive to light.   Neck:      Musculoskeletal: Normal range of motion and neck supple.      Thyroid: No thyromegaly.      Vascular: No JVD.      Trachea: No tracheal deviation.   Cardiovascular:      Rate and Rhythm: Normal rate and regular rhythm.      Heart sounds: Normal heart sounds. No murmur. No friction rub. No gallop.    Pulmonary:      Effort: Pulmonary effort is normal. No respiratory distress.      Breath sounds: Normal breath sounds. No stridor. No wheezing or rales.   Abdominal:      General: Bowel sounds are normal. There is no distension.      Palpations: Abdomen is soft. There is no mass.      Tenderness: There is no abdominal tenderness. There is no guarding or rebound.   Musculoskeletal: Normal range of motion.         General: No tenderness.   Lymphadenopathy:      Cervical: No cervical adenopathy.   Skin:     General: Skin is warm and dry.      Coloration: Skin is not pale.      Findings: No erythema or rash.   Neurological:      Mental Status: He is alert and oriented to person, place, and time.   Psychiatric:         Behavior: Behavior normal.         Thought Content: Thought  content normal.         Judgment: Judgment normal.         Assessment:       1. Well adult exam    2. Fatigue, unspecified type    3. Sleep apnea, unspecified type        Plan:       1.  CBC, CMP, UA, TSH, free T4, fasting lipids, vitamin-D level, and hemoglobin A1c.  2.  Encourage exercise and stretching.  Handouts given.  3.  Refer to sleep medicine for further evaluation and treatment of suspected sleep apnea.  4.  Return to clinic as needed or in 1 year for annual exam.

## 2020-12-02 ENCOUNTER — TELEPHONE (OUTPATIENT)
Dept: SLEEP MEDICINE | Facility: CLINIC | Age: 30
End: 2020-12-02

## 2020-12-02 ENCOUNTER — TELEPHONE (OUTPATIENT)
Dept: INTERNAL MEDICINE | Facility: CLINIC | Age: 30
End: 2020-12-02

## 2020-12-02 NOTE — TELEPHONE ENCOUNTER
Authorization for patient's visit to Dr Eh Patel has been denied. Reason: Spoke to Balbina BRODERICK at [employer] who stated any procedure code related to a diagnosis of sleep apnea or any sleep diagnosis is not a covered benefit. This does apply to an office visit consult.

## 2020-12-03 ENCOUNTER — TELEPHONE (OUTPATIENT)
Dept: SLEEP MEDICINE | Facility: CLINIC | Age: 30
End: 2020-12-03

## 2020-12-03 NOTE — TELEPHONE ENCOUNTER
----- Message from Patrick Gonzalez sent at 12/3/2020 10:18 AM CST -----  Regarding: self  Type:  Patient Returning Call    Who Called:  Self  Who Left Message for Patient: Dora    Does the patient know what this is regarding?: test denial  Would the patient rather a call back or a response via [facility]? Call     Best Call Back Number [phone]

## 2020-12-03 NOTE — TELEPHONE ENCOUNTER
Staff spoke wit patient wife an explained that insurance denied patient any coverage for Sleep apnea diagnoses.     She verbalized understanding and said she will speak with the insurance company and call financial assistance regarding self patient prices

## 2020-12-03 NOTE — TELEPHONE ENCOUNTER
----- Message from Padmini Salmeron sent at 12/3/2020 11:35 AM CST -----  Regarding: PT requesting a callback  Who Called: LARRY MERRITT [[id number]] Who Left Message for Patient: KATINA  Does the patient know what this is regarding? YES  Best Call Back Number:[phone] Additional Information:

## 2020-12-05 ENCOUNTER — LAB VISIT (OUTPATIENT)
Dept: LAB | Facility: HOSPITAL | Age: 30
End: 2020-12-05
Attending: FAMILY MEDICINE
Payer: COMMERCIAL

## 2020-12-05 DIAGNOSIS — Z00.00 WELL ADULT EXAM: ICD-10-CM

## 2020-12-05 LAB
25(OH)D3+25(OH)D2 SERPL-MCNC: 14 NG/ML (ref 30–96)
ALBUMIN SERPL BCP-MCNC: 4.1 G/DL (ref 3.5–5.2)
ALP SERPL-CCNC: 63 U/L (ref 55–135)
ALT SERPL W/O P-5'-P-CCNC: 27 U/L (ref 10–44)
ANION GAP SERPL CALC-SCNC: 10 MMOL/L (ref 8–16)
AST SERPL-CCNC: 17 U/L (ref 10–40)
BASOPHILS # BLD AUTO: 0.03 K/UL (ref 0–0.2)
BASOPHILS NFR BLD: 0.5 % (ref 0–1.9)
BILIRUB SERPL-MCNC: 0.7 MG/DL (ref 0.1–1)
BUN SERPL-MCNC: 11 MG/DL (ref 6–20)
CALCIUM SERPL-MCNC: 9.1 MG/DL (ref 8.7–10.5)
CHLORIDE SERPL-SCNC: 101 MMOL/L (ref 95–110)
CHOLEST SERPL-MCNC: 229 MG/DL (ref 120–199)
CHOLEST/HDLC SERPL: 5 {RATIO} (ref 2–5)
CO2 SERPL-SCNC: 28 MMOL/L (ref 23–29)
CREAT SERPL-MCNC: 0.8 MG/DL (ref 0.5–1.4)
DIFFERENTIAL METHOD: NORMAL
EOSINOPHIL # BLD AUTO: 0.1 K/UL (ref 0–0.5)
EOSINOPHIL NFR BLD: 2.3 % (ref 0–8)
ERYTHROCYTE [DISTWIDTH] IN BLOOD BY AUTOMATED COUNT: 12.8 % (ref 11.5–14.5)
EST. GFR  (AFRICAN AMERICAN): >60 ML/MIN/1.73 M^2
EST. GFR  (NON AFRICAN AMERICAN): >60 ML/MIN/1.73 M^2
ESTIMATED AVG GLUCOSE: 94 MG/DL (ref 68–131)
GLUCOSE SERPL-MCNC: 91 MG/DL (ref 70–110)
HBA1C MFR BLD HPLC: 4.9 % (ref 4–5.6)
HCT VFR BLD AUTO: 45 % (ref 40–54)
HDLC SERPL-MCNC: 46 MG/DL (ref 40–75)
HDLC SERPL: 20.1 % (ref 20–50)
HGB BLD-MCNC: 14.5 G/DL (ref 14–18)
IMM GRANULOCYTES # BLD AUTO: 0.01 K/UL (ref 0–0.04)
IMM GRANULOCYTES NFR BLD AUTO: 0.2 % (ref 0–0.5)
LDLC SERPL CALC-MCNC: 144 MG/DL (ref 63–159)
LYMPHOCYTES # BLD AUTO: 2.1 K/UL (ref 1–4.8)
LYMPHOCYTES NFR BLD: 34.3 % (ref 18–48)
MCH RBC QN AUTO: 29.1 PG (ref 27–31)
MCHC RBC AUTO-ENTMCNC: 32.2 G/DL (ref 32–36)
MCV RBC AUTO: 90 FL (ref 82–98)
MONOCYTES # BLD AUTO: 0.3 K/UL (ref 0.3–1)
MONOCYTES NFR BLD: 5.4 % (ref 4–15)
NEUTROPHILS # BLD AUTO: 3.5 K/UL (ref 1.8–7.7)
NEUTROPHILS NFR BLD: 57.3 % (ref 38–73)
NONHDLC SERPL-MCNC: 183 MG/DL
NRBC BLD-RTO: 0 /100 WBC
PLATELET # BLD AUTO: 272 K/UL (ref 150–350)
PMV BLD AUTO: 9.6 FL (ref 9.2–12.9)
POTASSIUM SERPL-SCNC: 3.8 MMOL/L (ref 3.5–5.1)
PROT SERPL-MCNC: 7.9 G/DL (ref 6–8.4)
RBC # BLD AUTO: 4.99 M/UL (ref 4.6–6.2)
SODIUM SERPL-SCNC: 139 MMOL/L (ref 136–145)
T4 FREE SERPL-MCNC: 0.95 NG/DL (ref 0.71–1.51)
TRIGL SERPL-MCNC: 195 MG/DL (ref 30–150)
TSH SERPL DL<=0.005 MIU/L-ACNC: 1.12 UIU/ML (ref 0.4–4)
WBC # BLD AUTO: 6.13 K/UL (ref 3.9–12.7)

## 2020-12-05 PROCEDURE — 84443 ASSAY THYROID STIM HORMONE: CPT

## 2020-12-05 PROCEDURE — 36415 COLL VENOUS BLD VENIPUNCTURE: CPT | Mod: PO

## 2020-12-05 PROCEDURE — 80061 LIPID PANEL: CPT

## 2020-12-05 PROCEDURE — 80053 COMPREHEN METABOLIC PANEL: CPT

## 2020-12-05 PROCEDURE — 85025 COMPLETE CBC W/AUTO DIFF WBC: CPT

## 2020-12-05 PROCEDURE — 82306 VITAMIN D 25 HYDROXY: CPT

## 2020-12-05 PROCEDURE — 83036 HEMOGLOBIN GLYCOSYLATED A1C: CPT

## 2020-12-05 PROCEDURE — 84439 ASSAY OF FREE THYROXINE: CPT

## 2020-12-06 ENCOUNTER — PATIENT MESSAGE (OUTPATIENT)
Dept: INTERNAL MEDICINE | Facility: CLINIC | Age: 30
End: 2020-12-06

## 2020-12-06 DIAGNOSIS — E55.9 VITAMIN D INSUFFICIENCY: ICD-10-CM

## 2020-12-06 RX ORDER — ERGOCALCIFEROL 1.25 MG/1
50000 CAPSULE ORAL
Qty: 4 CAPSULE | Refills: 2 | Status: SHIPPED | OUTPATIENT
Start: 2020-12-06 | End: 2021-02-07

## 2020-12-16 ENCOUNTER — OFFICE VISIT (OUTPATIENT)
Dept: SLEEP MEDICINE | Facility: CLINIC | Age: 30
End: 2020-12-16
Payer: COMMERCIAL

## 2020-12-16 ENCOUNTER — TELEPHONE (OUTPATIENT)
Dept: SLEEP MEDICINE | Facility: CLINIC | Age: 30
End: 2020-12-16

## 2020-12-16 VITALS
DIASTOLIC BLOOD PRESSURE: 71 MMHG | WEIGHT: 158.63 LBS | HEART RATE: 85 BPM | BODY MASS INDEX: 29.19 KG/M2 | HEIGHT: 62 IN | SYSTOLIC BLOOD PRESSURE: 121 MMHG

## 2020-12-16 DIAGNOSIS — R53.83 FATIGUE, UNSPECIFIED TYPE: Primary | ICD-10-CM

## 2020-12-16 DIAGNOSIS — G47.30 SLEEP APNEA, UNSPECIFIED TYPE: ICD-10-CM

## 2020-12-16 PROCEDURE — 99999 PR PBB SHADOW E&M-EST. PATIENT-LVL III: ICD-10-PCS | Mod: PBBFAC,,, | Performed by: INTERNAL MEDICINE

## 2020-12-16 PROCEDURE — 99204 PR OFFICE/OUTPT VISIT, NEW, LEVL IV, 45-59 MIN: ICD-10-PCS | Mod: S$GLB,,, | Performed by: INTERNAL MEDICINE

## 2020-12-16 PROCEDURE — 99999 PR PBB SHADOW E&M-EST. PATIENT-LVL III: CPT | Mod: PBBFAC,,, | Performed by: INTERNAL MEDICINE

## 2020-12-16 PROCEDURE — 99204 OFFICE O/P NEW MOD 45 MIN: CPT | Mod: S$GLB,,, | Performed by: INTERNAL MEDICINE

## 2020-12-16 NOTE — PROGRESS NOTES
"NEW PATIENT VISIT  Referred by Self, Aaareferral     Phoenix Jones  is a pleasant 30 y.o. male with no significant past medical history who presents with fatigue, snoring, and unrefreshing sleep.  : his primary language in Mandarin and interview was conducted via telephone      He has been tired recently , especially when waking up in the morning.  Also feels some weakness in his limbs.  Also has some soreness.  It is this weakness that is his primary concern  He has been told that he snores, attributes to working until he is very tired.  No snoring arousals.      Sleep has been observed by the patient's spouse who has noted loud snoring but no apneic events.  She is not complaining about his snoring as much of late.     SLEEP SCHEDULE:  Bed Time:  1-2A  Environment:      Lights out:      Hypnotic:      Sleep Latency: <3min  Arousals:  never  Nocturia:  none  Back to sleep: n/a  Wake time:  9-9:30A  Refreshed?     Dry mouth?  AM headache?    Weekend changes:   Naps:   never  Daytime sleepiness?none  Sleepy driving? none    Work schedule:     ESS:   0/24        ROS:  The patient notes the following symptoms:  difficulty breathing through the nose, irregular heartbeat, body aches and pains  The remainder of a complete ROS was performed and is negative except as noted above.  The patient's allergies and medications were reviewed.  The patient's past medical, family, and social history were reviewed.    Vitals:    12/16/20 1425   BP: 121/71   Pulse: 85   Weight: 72 kg (158 lb 9.9 oz)   Height: 5' 2" (1.575 m)     Physical Exam:  GEN:   Well-appearing, vitals reviewed  SKIN:  No rash on the face or bridge of the nose  EYES:  No icterus, pupils equal  HEENT: Mallampati 4  + tongue scalloping and + mild micrognathia  CV:  Regular rate and rhythm    no lower extremity edema  RESP:  Normal effort    clear to auscultation bilaterally  MSK:  Normal gait and station  Psych:  Appropriate affect, demonstrates " insight  Neuro:  no resting tremor    RECORDS REVIEWED TODAY:    Lab Results   Component Value Date    HGB 14.5 12/05/2020       Studies: none.    ASSESSMENT:    POSSIBLE KASH: Comorbidities: none .  STOP BANG positive predictors:  Loud snoring, Neck circumference >16 inches, Male .  TOTAL = 3/8.    Fatigue: some mylagias    PLAN:    -will start with home sleep testing (HST) given the patient's high pretest probability of KASH. If home sleep testing is inconclusive, will need an in-lab diagnostic study to definitively exclude KASH.   -we discussed trial of PAP therapy  -the patient was warned to never drive when sleepy    -will arrange RTC depending on results of sleep testing.    The patient was counseled on the pathophysiology of obstructive sleep apnea and mask and machine desensitization    The patient was given open opportunity to ask questions and/or express concerns about treatment plan.   All questions/concerns were discussed.     Two patient identifiers used prior to evaluation.     Preferred method of contact: portal (once he is able to set this up)

## 2020-12-17 ENCOUNTER — TELEPHONE (OUTPATIENT)
Dept: SLEEP MEDICINE | Facility: CLINIC | Age: 30
End: 2020-12-17

## 2020-12-17 NOTE — TELEPHONE ENCOUNTER
MD Dora Horowitz MA             Could you let the patient know that his insurance does not cover sleep studies (or CPAP).  The cost for a sleep study is about $300.  He could find out for sure from the Central Pricing Office at [phone] or [phone].   The [place] code [place] 90[address]. Thanks!   If you have a question about a bill from [facility], please call [phone] for assistance.      informed pt wife of the above information      Provided her with sleep lab phone number so that she can call to get scheduled once payment is made

## 2020-12-18 ENCOUNTER — TELEPHONE (OUTPATIENT)
Dept: SLEEP MEDICINE | Facility: OTHER | Age: 30
End: 2020-12-18

## 2020-12-22 ENCOUNTER — TELEPHONE (OUTPATIENT)
Dept: INTERNAL MEDICINE | Facility: CLINIC | Age: 30
End: 2020-12-22

## 2020-12-22 DIAGNOSIS — M54.9 BACK PAIN, UNSPECIFIED BACK LOCATION, UNSPECIFIED BACK PAIN LATERALITY, UNSPECIFIED CHRONICITY: Primary | ICD-10-CM

## 2020-12-22 NOTE — TELEPHONE ENCOUNTER
----- Message from Priscila Regalado sent at 12/22/2020  1:47 PM CST -----  Contact: [phone]  Patient would like to get a referral.  Referral to what specialty:  Orthopedic  Does the patient want the referral with a specific physician:  No  Is the specialist an [facility]:    Reason (be specific):  Spinal problems  Does the patient already have the specialty clinic appointment scheduled:  No  If yes, what date is the appointment scheduled:     Is the insurance listed in Epic correct? (this is important for a referral):  yes  Advised patient that once provider approves this either a nurse or  will return their call?:   Comments:

## 2020-12-23 NOTE — TELEPHONE ENCOUNTER
----- Message from Hawa Juárez sent at 12/23/2020 12:29 PM CST -----  Regarding: Patient called today and would like for someone in Dr. Benton to give him a call, said that it is important  Contact: [phone]  Patient called today and would like for someone in Dr. Benton to give him a call, said that it is important

## 2020-12-23 NOTE — TELEPHONE ENCOUNTER
Spoke with pt's wife re: referral. Explained to her that the Dr is out all week & waiting on the orders to be approved.  Questions re: labs. Vitamin D low. Rx at pharmacy.

## 2020-12-28 ENCOUNTER — NURSE TRIAGE (OUTPATIENT)
Dept: ADMINISTRATIVE | Facility: CLINIC | Age: 30
End: 2020-12-28

## 2020-12-28 ENCOUNTER — TELEPHONE (OUTPATIENT)
Dept: INTERNAL MEDICINE | Facility: CLINIC | Age: 30
End: 2020-12-28

## 2020-12-28 NOTE — TELEPHONE ENCOUNTER
----- Message from Darya Mckinney sent at 12/28/2020 12:24 PM CST -----  Contact: patient [phone]  Patient received his lab results and would like to speak with someone to clarify what they mean. Please call pt to discuss.

## 2020-12-28 NOTE — TELEPHONE ENCOUNTER
----- Message from Darya Mckinney sent at 12/28/2020 12:24 PM CST -----  Contact: patient [phone]  Patient received his lab results and would like to speak with someone to clarify what they mean. Please call pt to discuss.

## 2020-12-28 NOTE — TELEPHONE ENCOUNTER
Reason for Disposition   Requesting regular office appointment    Protocols used: INFORMATION ONLY CALL-A-AH    Pt calling to make an appointment for his back. Pt has a referral for the back and spine clinic. Pt transferred to Norma the  in the clinic for further assistance.

## 2020-12-29 ENCOUNTER — TELEPHONE (OUTPATIENT)
Dept: INTERNAL MEDICINE | Facility: CLINIC | Age: 30
End: 2020-12-29

## 2020-12-29 ENCOUNTER — TELEPHONE (OUTPATIENT)
Dept: SLEEP MEDICINE | Facility: OTHER | Age: 30
End: 2020-12-29

## 2020-12-29 NOTE — TELEPHONE ENCOUNTER
I spoke to pt, he did speak to someone regarding lab results. I reminded him that Rx Vit D was sent to his pharmacy.    Also went over appt's for sports medicine in Jan 2021 and repeat lab appt for March 2021.  Pt is able to see all appt's in [employer] Voice lane on his phone or computer.    Pt verbalized understanding

## 2020-12-29 NOTE — TELEPHONE ENCOUNTER
----- Message from Lindsay Arellano sent at 12/29/2020  1:21 PM CST -----  Regarding: Results  Hello,     Pt's wife says she needs someone to explain the blood work results to them.

## 2020-12-30 ENCOUNTER — TELEPHONE (OUTPATIENT)
Dept: SLEEP MEDICINE | Facility: OTHER | Age: 30
End: 2020-12-30

## 2021-01-05 ENCOUNTER — TELEPHONE (OUTPATIENT)
Dept: INTERNAL MEDICINE | Facility: CLINIC | Age: 31
End: 2021-01-05

## 2021-01-07 ENCOUNTER — TELEPHONE (OUTPATIENT)
Dept: SLEEP MEDICINE | Facility: OTHER | Age: 31
End: 2021-01-07

## 2021-01-07 ENCOUNTER — TELEPHONE (OUTPATIENT)
Dept: INTERNAL MEDICINE | Facility: CLINIC | Age: 31
End: 2021-01-07

## 2021-01-08 ENCOUNTER — HOSPITAL ENCOUNTER (OUTPATIENT)
Dept: SLEEP MEDICINE | Facility: OTHER | Age: 31
Discharge: HOME | End: 2021-01-08
Attending: INTERNAL MEDICINE
Payer: COMMERCIAL

## 2021-01-08 DIAGNOSIS — G47.30 SLEEP APNEA, UNSPECIFIED TYPE: ICD-10-CM

## 2021-01-08 DIAGNOSIS — R53.83 FATIGUE, UNSPECIFIED TYPE: ICD-10-CM

## 2021-01-08 PROCEDURE — 95800 SLP STDY UNATTENDED: CPT

## 2021-01-13 ENCOUNTER — OFFICE VISIT (OUTPATIENT)
Dept: ORTHOPEDICS | Facility: CLINIC | Age: 31
End: 2021-01-13
Payer: COMMERCIAL

## 2021-01-13 ENCOUNTER — HOSPITAL ENCOUNTER (OUTPATIENT)
Dept: RADIOLOGY | Facility: HOSPITAL | Age: 31
Discharge: HOME | End: 2021-01-13
Attending: ORTHOPAEDIC SURGERY
Payer: COMMERCIAL

## 2021-01-13 VITALS — WEIGHT: 156.19 LBS | HEIGHT: 62 IN | BODY MASS INDEX: 28.74 KG/M2

## 2021-01-13 DIAGNOSIS — M54.9 BACK PAIN, UNSPECIFIED BACK LOCATION, UNSPECIFIED BACK PAIN LATERALITY, UNSPECIFIED CHRONICITY: ICD-10-CM

## 2021-01-13 DIAGNOSIS — M50.30 DDD (DEGENERATIVE DISC DISEASE), CERVICAL: ICD-10-CM

## 2021-01-13 DIAGNOSIS — M54.12 CERVICAL RADICULOPATHY: Primary | ICD-10-CM

## 2021-01-13 PROCEDURE — 99204 PR OFFICE/OUTPT VISIT, NEW, LEVL IV, 45-59 MIN: ICD-10-PCS | Mod: S$GLB,,, | Performed by: ORTHOPAEDIC SURGERY

## 2021-01-13 PROCEDURE — 99999 PR PBB SHADOW E&M-EST. PATIENT-LVL III: CPT | Mod: PBBFAC,,, | Performed by: ORTHOPAEDIC SURGERY

## 2021-01-13 PROCEDURE — 72050 XR CERVICAL SPINE AP LAT WITH FLEX EXTEN: ICD-10-PCS | Mod: 26,,, | Performed by: RADIOLOGY

## 2021-01-13 PROCEDURE — 72050 X-RAY EXAM NECK SPINE 4/5VWS: CPT | Mod: 26,,, | Performed by: RADIOLOGY

## 2021-01-13 PROCEDURE — 99999 PR PBB SHADOW E&M-EST. PATIENT-LVL III: ICD-10-PCS | Mod: PBBFAC,,, | Performed by: ORTHOPAEDIC SURGERY

## 2021-01-13 PROCEDURE — 72050 X-RAY EXAM NECK SPINE 4/5VWS: CPT | Mod: TC

## 2021-01-13 PROCEDURE — 99204 OFFICE O/P NEW MOD 45 MIN: CPT | Mod: S$GLB,,, | Performed by: ORTHOPAEDIC SURGERY

## 2021-01-19 ENCOUNTER — HOSPITAL ENCOUNTER (OUTPATIENT)
Dept: RADIOLOGY | Facility: HOSPITAL | Age: 31
Discharge: HOME | End: 2021-01-19
Attending: ORTHOPAEDIC SURGERY
Payer: COMMERCIAL

## 2021-01-19 DIAGNOSIS — M54.12 CERVICAL RADICULOPATHY: ICD-10-CM

## 2021-01-19 PROCEDURE — 72141 MRI NECK SPINE W/O DYE: CPT | Mod: 26,,, | Performed by: RADIOLOGY

## 2021-01-19 PROCEDURE — 72141 MRI CERVICAL SPINE WITHOUT CONTRAST: ICD-10-PCS | Mod: 26,,, | Performed by: RADIOLOGY

## 2021-01-19 PROCEDURE — 72141 MRI NECK SPINE W/O DYE: CPT | Mod: TC

## 2021-01-21 ENCOUNTER — OFFICE VISIT (OUTPATIENT)
Dept: ORTHOPEDICS | Facility: CLINIC | Age: 31
End: 2021-01-21
Payer: COMMERCIAL

## 2021-01-21 VITALS — WEIGHT: 156.06 LBS | BODY MASS INDEX: 28.72 KG/M2 | HEIGHT: 62 IN

## 2021-01-21 DIAGNOSIS — G89.29 CHRONIC BILATERAL LOW BACK PAIN WITHOUT SCIATICA: Primary | ICD-10-CM

## 2021-01-21 DIAGNOSIS — M79.601 PAIN IN BOTH UPPER EXTREMITIES: ICD-10-CM

## 2021-01-21 DIAGNOSIS — M54.50 CHRONIC BILATERAL LOW BACK PAIN WITHOUT SCIATICA: Primary | ICD-10-CM

## 2021-01-21 DIAGNOSIS — M79.602 PAIN IN BOTH UPPER EXTREMITIES: ICD-10-CM

## 2021-01-21 PROCEDURE — 99999 PR PBB SHADOW E&M-EST. PATIENT-LVL II: ICD-10-PCS | Mod: PBBFAC,,, | Performed by: ORTHOPAEDIC SURGERY

## 2021-01-21 PROCEDURE — 99213 PR OFFICE/OUTPT VISIT, EST, LEVL III, 20-29 MIN: ICD-10-PCS | Mod: S$GLB,,, | Performed by: ORTHOPAEDIC SURGERY

## 2021-01-21 PROCEDURE — 99213 OFFICE O/P EST LOW 20 MIN: CPT | Mod: S$GLB,,, | Performed by: ORTHOPAEDIC SURGERY

## 2021-01-21 PROCEDURE — 99999 PR PBB SHADOW E&M-EST. PATIENT-LVL II: CPT | Mod: PBBFAC,,, | Performed by: ORTHOPAEDIC SURGERY

## 2021-01-21 RX ORDER — METHOCARBAMOL 750 MG/1
750 TABLET, FILM COATED ORAL 3 TIMES DAILY
Qty: 90 TABLET | Refills: 0 | Status: SHIPPED | OUTPATIENT
Start: 2021-01-21 | End: 2021-01-31

## 2021-01-22 ENCOUNTER — OFFICE VISIT (OUTPATIENT)
Dept: INTERNAL MEDICINE | Facility: CLINIC | Age: 31
End: 2021-01-22
Payer: COMMERCIAL

## 2021-01-22 VITALS
WEIGHT: 157.44 LBS | DIASTOLIC BLOOD PRESSURE: 72 MMHG | HEART RATE: 83 BPM | TEMPERATURE: 98 F | SYSTOLIC BLOOD PRESSURE: 110 MMHG | BODY MASS INDEX: 26.88 KG/M2 | OXYGEN SATURATION: 96 % | HEIGHT: 64 IN

## 2021-01-22 DIAGNOSIS — R53.83 FATIGUE, UNSPECIFIED TYPE: Primary | ICD-10-CM

## 2021-01-22 DIAGNOSIS — M54.12 CERVICAL RADICULOPATHY: ICD-10-CM

## 2021-01-22 DIAGNOSIS — M54.9 BACK PAIN, UNSPECIFIED BACK LOCATION, UNSPECIFIED BACK PAIN LATERALITY, UNSPECIFIED CHRONICITY: ICD-10-CM

## 2021-01-22 DIAGNOSIS — G47.30 SLEEP APNEA, UNSPECIFIED TYPE: ICD-10-CM

## 2021-01-22 PROBLEM — M54.2 NECK PAIN: Status: ACTIVE | Noted: 2021-01-22

## 2021-01-22 PROBLEM — M54.2 NECK PAIN: Status: RESOLVED | Noted: 2021-01-22 | Resolved: 2021-01-22

## 2021-01-22 PROCEDURE — 99999 PR PBB SHADOW E&M-EST. PATIENT-LVL IV: ICD-10-PCS | Mod: PBBFAC,,, | Performed by: FAMILY MEDICINE

## 2021-01-22 PROCEDURE — 99213 OFFICE O/P EST LOW 20 MIN: CPT | Mod: S$GLB,,, | Performed by: FAMILY MEDICINE

## 2021-01-22 PROCEDURE — 99999 PR PBB SHADOW E&M-EST. PATIENT-LVL IV: CPT | Mod: PBBFAC,,, | Performed by: FAMILY MEDICINE

## 2021-01-22 PROCEDURE — 99213 PR OFFICE/OUTPT VISIT, EST, LEVL III, 20-29 MIN: ICD-10-PCS | Mod: S$GLB,,, | Performed by: FAMILY MEDICINE

## 2021-01-28 ENCOUNTER — PATIENT OUTREACH (OUTPATIENT)
Dept: ADMINISTRATIVE | Facility: OTHER | Age: 31
End: 2021-01-28

## 2021-01-29 ENCOUNTER — OFFICE VISIT (OUTPATIENT)
Dept: CARDIOLOGY | Facility: CLINIC | Age: 31
End: 2021-01-29
Payer: COMMERCIAL

## 2021-01-29 VITALS
BODY MASS INDEX: 28.05 KG/M2 | HEART RATE: 74 BPM | OXYGEN SATURATION: 97 % | HEIGHT: 63 IN | SYSTOLIC BLOOD PRESSURE: 117 MMHG | WEIGHT: 158.31 LBS | DIASTOLIC BLOOD PRESSURE: 69 MMHG

## 2021-01-29 DIAGNOSIS — R07.9 CHEST PAIN OF UNKNOWN ETIOLOGY: Primary | ICD-10-CM

## 2021-01-29 PROCEDURE — 99999 PR PBB SHADOW E&M-EST. PATIENT-LVL III: CPT | Mod: PBBFAC,,, | Performed by: INTERNAL MEDICINE

## 2021-01-29 PROCEDURE — 99204 OFFICE O/P NEW MOD 45 MIN: CPT | Mod: S$GLB,,, | Performed by: INTERNAL MEDICINE

## 2021-01-29 PROCEDURE — 99999 PR PBB SHADOW E&M-EST. PATIENT-LVL III: ICD-10-PCS | Mod: PBBFAC,,, | Performed by: INTERNAL MEDICINE

## 2021-01-29 PROCEDURE — 99204 PR OFFICE/OUTPT VISIT, NEW, LEVL IV, 45-59 MIN: ICD-10-PCS | Mod: S$GLB,,, | Performed by: INTERNAL MEDICINE

## 2021-02-01 ENCOUNTER — TELEPHONE (OUTPATIENT)
Dept: INTERNAL MEDICINE | Facility: CLINIC | Age: 31
End: 2021-02-01

## 2021-02-01 ENCOUNTER — HOSPITAL ENCOUNTER (OUTPATIENT)
Dept: CARDIOLOGY | Facility: HOSPITAL | Age: 31
Discharge: HOME | End: 2021-02-01
Attending: INTERNAL MEDICINE
Payer: COMMERCIAL

## 2021-02-01 VITALS
DIASTOLIC BLOOD PRESSURE: 69 MMHG | WEIGHT: 158 LBS | HEART RATE: 68 BPM | BODY MASS INDEX: 28 KG/M2 | SYSTOLIC BLOOD PRESSURE: 117 MMHG | HEIGHT: 63 IN

## 2021-02-01 DIAGNOSIS — R07.9 CHEST PAIN OF UNKNOWN ETIOLOGY: ICD-10-CM

## 2021-02-01 LAB
ASCENDING AORTA: 2.93 CM
AV INDEX (PROSTH): 0.51
AV MEAN GRADIENT: 11 MMHG
AV PEAK GRADIENT: 18 MMHG
AV VALVE AREA: 1.64 CM2
AV VELOCITY RATIO: 0.45
BSA FOR ECHO PROCEDURE: 1.78 M2
CV ECHO LV RWT: 0.25 CM
DOP CALC AO PEAK VEL: 2.15 M/S
DOP CALC AO VTI: 39.31 CM
DOP CALC LVOT AREA: 3.2 CM2
DOP CALC LVOT DIAMETER: 2.03 CM
DOP CALC LVOT PEAK VEL: 0.96 M/S
DOP CALC LVOT STROKE VOLUME: 64.28 CM3
DOP CALCLVOT PEAK VEL VTI: 19.87 CM
E WAVE DECELERATION TIME: 149.76 MSEC
E/A RATIO: 1.04
E/E' RATIO: 7.18 M/S
ECHO LV POSTERIOR WALL: 0.71 CM (ref 0.6–1.1)
FRACTIONAL SHORTENING: 34 % (ref 28–44)
INTERVENTRICULAR SEPTUM: 0.67 CM (ref 0.6–1.1)
IVRT: 88.49 MSEC
LA MAJOR: 4.86 CM
LA MINOR: 4.89 CM
LA WIDTH: 3.62 CM
LEFT ATRIUM SIZE: 3.84 CM
LEFT ATRIUM VOLUME INDEX MOD: 20.4 ML/M2
LEFT ATRIUM VOLUME INDEX: 32.9 ML/M2
LEFT ATRIUM VOLUME MOD: 35.73 CM3
LEFT ATRIUM VOLUME: 57.6 CM3
LEFT INTERNAL DIMENSION IN SYSTOLE: 3.7 CM (ref 2.1–4)
LEFT VENTRICLE DIASTOLIC VOLUME INDEX: 88.82 ML/M2
LEFT VENTRICLE DIASTOLIC VOLUME: 155.43 ML
LEFT VENTRICLE MASS INDEX: 79 G/M2
LEFT VENTRICLE SYSTOLIC VOLUME INDEX: 33.1 ML/M2
LEFT VENTRICLE SYSTOLIC VOLUME: 57.95 ML
LEFT VENTRICULAR INTERNAL DIMENSION IN DIASTOLE: 5.63 CM (ref 3.5–6)
LEFT VENTRICULAR MASS: 138.73 G
LV LATERAL E/E' RATIO: 6.58 M/S
LV SEPTAL E/E' RATIO: 7.9 M/S
MV A" WAVE DURATION": 10.85 MSEC
MV PEAK A VEL: 0.76 M/S
MV PEAK E VEL: 0.79 M/S
PISA TR MAX VEL: 2.98 M/S
PULM VEIN S/D RATIO: 1.36
PV PEAK D VEL: 0.5 M/S
PV PEAK S VEL: 0.68 M/S
RA MAJOR: 4.83 CM
RA PRESSURE: 3 MMHG
RA WIDTH: 2.69 CM
RIGHT VENTRICULAR END-DIASTOLIC DIMENSION: 2.87 CM
RV TISSUE DOPPLER FREE WALL SYSTOLIC VELOCITY 1 (APICAL 4 CHAMBER VIEW): 14.07 CM/S
SINUS: 2.92 CM
STJ: 2.77 CM
TDI LATERAL: 0.12 M/S
TDI SEPTAL: 0.1 M/S
TDI: 0.11 M/S
TR MAX PG: 36 MMHG
TRICUSPID ANNULAR PLANE SYSTOLIC EXCURSION: 2.23 CM
TV REST PULMONARY ARTERY PRESSURE: 39 MMHG

## 2021-02-01 PROCEDURE — 93306 TTE W/DOPPLER COMPLETE: CPT | Mod: 26,,, | Performed by: INTERNAL MEDICINE

## 2021-02-01 PROCEDURE — 93306 ECHO (CUPID ONLY): ICD-10-PCS | Mod: 26,,, | Performed by: INTERNAL MEDICINE

## 2021-02-01 PROCEDURE — 93306 TTE W/DOPPLER COMPLETE: CPT

## 2021-02-03 ENCOUNTER — PATIENT MESSAGE (OUTPATIENT)
Dept: CARDIOLOGY | Facility: CLINIC | Age: 31
End: 2021-02-03

## 2021-02-08 ENCOUNTER — CLINICAL SUPPORT (OUTPATIENT)
Dept: REHABILITATION | Facility: OTHER | Age: 31
End: 2021-02-08
Attending: FAMILY MEDICINE
Payer: COMMERCIAL

## 2021-02-08 ENCOUNTER — CLINICAL SUPPORT (OUTPATIENT)
Dept: REHABILITATION | Facility: OTHER | Age: 31
End: 2021-02-08
Attending: PHYSICAL MEDICINE & REHABILITATION
Payer: COMMERCIAL

## 2021-02-08 VITALS
WEIGHT: 157.19 LBS | HEIGHT: 63 IN | DIASTOLIC BLOOD PRESSURE: 63 MMHG | BODY MASS INDEX: 27.85 KG/M2 | HEART RATE: 89 BPM | SYSTOLIC BLOOD PRESSURE: 101 MMHG

## 2021-02-08 DIAGNOSIS — M54.6 CHRONIC MIDLINE THORACIC BACK PAIN: Primary | ICD-10-CM

## 2021-02-08 DIAGNOSIS — M54.9 BACK PAIN, UNSPECIFIED BACK LOCATION, UNSPECIFIED BACK PAIN LATERALITY, UNSPECIFIED CHRONICITY: ICD-10-CM

## 2021-02-08 DIAGNOSIS — G89.29 CHRONIC MIDLINE THORACIC BACK PAIN: Primary | ICD-10-CM

## 2021-02-08 DIAGNOSIS — R29.3 POOR POSTURE: ICD-10-CM

## 2021-02-08 PROCEDURE — 99243 PR OFFICE CONSULTATION,LEVEL III: ICD-10-PCS | Mod: ,,, | Performed by: PHYSICAL MEDICINE & REHABILITATION

## 2021-02-08 PROCEDURE — 99243 OFF/OP CNSLTJ NEW/EST LOW 30: CPT | Mod: ,,, | Performed by: PHYSICAL MEDICINE & REHABILITATION

## 2021-02-17 ENCOUNTER — PATIENT MESSAGE (OUTPATIENT)
Dept: CARDIOLOGY | Facility: CLINIC | Age: 31
End: 2021-02-17

## 2021-02-17 DIAGNOSIS — R10.13 EPIGASTRIC PAIN: Primary | ICD-10-CM

## 2021-02-25 ENCOUNTER — OFFICE VISIT (OUTPATIENT)
Dept: RHEUMATOLOGY | Facility: CLINIC | Age: 31
End: 2021-02-25
Payer: COMMERCIAL

## 2021-02-25 VITALS
HEART RATE: 85 BPM | DIASTOLIC BLOOD PRESSURE: 77 MMHG | WEIGHT: 160.69 LBS | SYSTOLIC BLOOD PRESSURE: 116 MMHG | BODY MASS INDEX: 28.47 KG/M2

## 2021-02-25 DIAGNOSIS — M54.9 CHRONIC UPPER BACK PAIN: ICD-10-CM

## 2021-02-25 DIAGNOSIS — F41.9 ANXIETY: ICD-10-CM

## 2021-02-25 DIAGNOSIS — M54.50 CHRONIC MIDLINE LOW BACK PAIN WITHOUT SCIATICA: ICD-10-CM

## 2021-02-25 DIAGNOSIS — G47.9 SLEEP DISORDER: Primary | ICD-10-CM

## 2021-02-25 DIAGNOSIS — G89.29 CHRONIC MIDLINE LOW BACK PAIN WITHOUT SCIATICA: ICD-10-CM

## 2021-02-25 DIAGNOSIS — G89.29 CHRONIC UPPER BACK PAIN: ICD-10-CM

## 2021-02-25 DIAGNOSIS — R10.9 ABDOMINAL PAIN, UNSPECIFIED ABDOMINAL LOCATION: ICD-10-CM

## 2021-02-25 PROCEDURE — 99205 OFFICE O/P NEW HI 60 MIN: CPT | Mod: S$GLB,,, | Performed by: INTERNAL MEDICINE

## 2021-02-25 PROCEDURE — 99999 PR PBB SHADOW E&M-EST. PATIENT-LVL IV: ICD-10-PCS | Mod: PBBFAC,,, | Performed by: INTERNAL MEDICINE

## 2021-02-25 PROCEDURE — 99999 PR PBB SHADOW E&M-EST. PATIENT-LVL IV: CPT | Mod: PBBFAC,,, | Performed by: INTERNAL MEDICINE

## 2021-02-25 PROCEDURE — 99205 PR OFFICE/OUTPT VISIT, NEW, LEVL V, 60-74 MIN: ICD-10-PCS | Mod: S$GLB,,, | Performed by: INTERNAL MEDICINE

## 2021-03-02 ENCOUNTER — CLINICAL SUPPORT (OUTPATIENT)
Dept: REHABILITATION | Facility: HOSPITAL | Age: 31
End: 2021-03-02
Attending: INTERNAL MEDICINE
Payer: COMMERCIAL

## 2021-03-02 DIAGNOSIS — G89.29 CHRONIC MIDLINE LOW BACK PAIN WITHOUT SCIATICA: ICD-10-CM

## 2021-03-02 DIAGNOSIS — G89.29 CHRONIC UPPER BACK PAIN: ICD-10-CM

## 2021-03-02 DIAGNOSIS — M54.9 CHRONIC UPPER BACK PAIN: ICD-10-CM

## 2021-03-02 DIAGNOSIS — M54.50 CHRONIC MIDLINE LOW BACK PAIN WITHOUT SCIATICA: ICD-10-CM

## 2021-03-02 DIAGNOSIS — M53.84 DECREASED ROM OF THORACIC SPINE: ICD-10-CM

## 2021-03-02 DIAGNOSIS — R29.3 POSTURE IMBALANCE: ICD-10-CM

## 2021-03-02 DIAGNOSIS — R29.898 DECREASED STRENGTH OF TRUNK AND BACK: ICD-10-CM

## 2021-03-02 PROCEDURE — 97161 PT EVAL LOW COMPLEX 20 MIN: CPT | Mod: PO

## 2021-03-02 PROCEDURE — 97110 THERAPEUTIC EXERCISES: CPT | Mod: PO

## 2021-03-03 ENCOUNTER — PATIENT MESSAGE (OUTPATIENT)
Dept: RHEUMATOLOGY | Facility: CLINIC | Age: 31
End: 2021-03-03

## 2021-03-03 ENCOUNTER — DOCUMENTATION ONLY (OUTPATIENT)
Dept: RHEUMATOLOGY | Facility: CLINIC | Age: 31
End: 2021-03-03

## 2021-03-05 ENCOUNTER — LAB VISIT (OUTPATIENT)
Dept: LAB | Facility: HOSPITAL | Age: 31
End: 2021-03-05
Attending: FAMILY MEDICINE
Payer: COMMERCIAL

## 2021-03-05 ENCOUNTER — PATIENT MESSAGE (OUTPATIENT)
Dept: SLEEP MEDICINE | Facility: CLINIC | Age: 31
End: 2021-03-05

## 2021-03-05 DIAGNOSIS — E55.9 VITAMIN D INSUFFICIENCY: ICD-10-CM

## 2021-03-05 LAB — 25(OH)D3+25(OH)D2 SERPL-MCNC: 25 NG/ML (ref 30–96)

## 2021-03-05 PROCEDURE — 36415 COLL VENOUS BLD VENIPUNCTURE: CPT | Mod: PO | Performed by: FAMILY MEDICINE

## 2021-03-05 PROCEDURE — 82306 VITAMIN D 25 HYDROXY: CPT | Performed by: FAMILY MEDICINE

## 2021-03-07 ENCOUNTER — PATIENT MESSAGE (OUTPATIENT)
Dept: INTERNAL MEDICINE | Facility: CLINIC | Age: 31
End: 2021-03-07

## 2021-03-08 ENCOUNTER — PATIENT MESSAGE (OUTPATIENT)
Dept: INTERNAL MEDICINE | Facility: CLINIC | Age: 31
End: 2021-03-08

## 2021-03-08 ENCOUNTER — PATIENT MESSAGE (OUTPATIENT)
Dept: SLEEP MEDICINE | Facility: CLINIC | Age: 31
End: 2021-03-08

## 2021-03-08 DIAGNOSIS — R10.9 ABDOMINAL PAIN, UNSPECIFIED ABDOMINAL LOCATION: Primary | ICD-10-CM

## 2021-03-09 DIAGNOSIS — G47.33 OSA (OBSTRUCTIVE SLEEP APNEA): Primary | ICD-10-CM

## 2021-03-11 ENCOUNTER — HOSPITAL ENCOUNTER (EMERGENCY)
Facility: HOSPITAL | Age: 31
Discharge: HOME | End: 2021-03-11
Attending: EMERGENCY MEDICINE
Payer: COMMERCIAL

## 2021-03-11 VITALS
WEIGHT: 160 LBS | DIASTOLIC BLOOD PRESSURE: 76 MMHG | HEART RATE: 71 BPM | OXYGEN SATURATION: 96 % | BODY MASS INDEX: 28.35 KG/M2 | SYSTOLIC BLOOD PRESSURE: 117 MMHG | TEMPERATURE: 99 F | HEIGHT: 63 IN | RESPIRATION RATE: 16 BRPM

## 2021-03-11 DIAGNOSIS — R53.83 FATIGUE, UNSPECIFIED TYPE: Primary | ICD-10-CM

## 2021-03-11 DIAGNOSIS — G47.33 OSA (OBSTRUCTIVE SLEEP APNEA): ICD-10-CM

## 2021-03-11 DIAGNOSIS — K21.9 GASTROESOPHAGEAL REFLUX DISEASE, UNSPECIFIED WHETHER ESOPHAGITIS PRESENT: ICD-10-CM

## 2021-03-11 LAB
ALBUMIN SERPL BCP-MCNC: 4.1 G/DL (ref 3.5–5.2)
ALP SERPL-CCNC: 69 U/L (ref 55–135)
ALT SERPL W/O P-5'-P-CCNC: 21 U/L (ref 10–44)
ANION GAP SERPL CALC-SCNC: 10 MMOL/L (ref 8–16)
AST SERPL-CCNC: 15 U/L (ref 10–40)
BASOPHILS # BLD AUTO: 0.03 K/UL (ref 0–0.2)
BASOPHILS NFR BLD: 0.4 % (ref 0–1.9)
BILIRUB SERPL-MCNC: 0.6 MG/DL (ref 0.1–1)
BILIRUB UR QL STRIP: NEGATIVE
BUN SERPL-MCNC: 11 MG/DL (ref 6–20)
CALCIUM SERPL-MCNC: 9.3 MG/DL (ref 8.7–10.5)
CHLORIDE SERPL-SCNC: 103 MMOL/L (ref 95–110)
CLARITY UR REFRACT.AUTO: CLEAR
CO2 SERPL-SCNC: 26 MMOL/L (ref 23–29)
COLOR UR AUTO: NORMAL
CREAT SERPL-MCNC: 0.8 MG/DL (ref 0.5–1.4)
DIFFERENTIAL METHOD: NORMAL
EOSINOPHIL # BLD AUTO: 0.2 K/UL (ref 0–0.5)
EOSINOPHIL NFR BLD: 1.9 % (ref 0–8)
ERYTHROCYTE [DISTWIDTH] IN BLOOD BY AUTOMATED COUNT: 12.4 % (ref 11.5–14.5)
EST. GFR  (AFRICAN AMERICAN): >60 ML/MIN/1.73 M^2
EST. GFR  (NON AFRICAN AMERICAN): >60 ML/MIN/1.73 M^2
GLUCOSE SERPL-MCNC: 97 MG/DL (ref 70–110)
GLUCOSE UR QL STRIP: NEGATIVE
HCT VFR BLD AUTO: 43.7 % (ref 40–54)
HGB BLD-MCNC: 14.9 G/DL (ref 14–18)
HGB UR QL STRIP: NEGATIVE
IMM GRANULOCYTES # BLD AUTO: 0.02 K/UL (ref 0–0.04)
IMM GRANULOCYTES NFR BLD AUTO: 0.2 % (ref 0–0.5)
KETONES UR QL STRIP: NEGATIVE
LEUKOCYTE ESTERASE UR QL STRIP: NEGATIVE
LIPASE SERPL-CCNC: 25 U/L (ref 4–60)
LYMPHOCYTES # BLD AUTO: 1.9 K/UL (ref 1–4.8)
LYMPHOCYTES NFR BLD: 23 % (ref 18–48)
MCH RBC QN AUTO: 29.3 PG (ref 27–31)
MCHC RBC AUTO-ENTMCNC: 34.1 G/DL (ref 32–36)
MCV RBC AUTO: 86 FL (ref 82–98)
MONOCYTES # BLD AUTO: 0.5 K/UL (ref 0.3–1)
MONOCYTES NFR BLD: 6.2 % (ref 4–15)
NEUTROPHILS # BLD AUTO: 5.5 K/UL (ref 1.8–7.7)
NEUTROPHILS NFR BLD: 68.3 % (ref 38–73)
NITRITE UR QL STRIP: NEGATIVE
NRBC BLD-RTO: 0 /100 WBC
PH UR STRIP: 7 [PH] (ref 5–8)
PLATELET # BLD AUTO: 285 K/UL (ref 150–350)
PMV BLD AUTO: 9.4 FL (ref 9.2–12.9)
POTASSIUM SERPL-SCNC: 3.9 MMOL/L (ref 3.5–5.1)
PROT SERPL-MCNC: 8.1 G/DL (ref 6–8.4)
PROT UR QL STRIP: NEGATIVE
RBC # BLD AUTO: 5.08 M/UL (ref 4.6–6.2)
SODIUM SERPL-SCNC: 139 MMOL/L (ref 136–145)
SP GR UR STRIP: 1 (ref 1–1.03)
URN SPEC COLLECT METH UR: NORMAL
WBC # BLD AUTO: 8.1 K/UL (ref 3.9–12.7)

## 2021-03-11 PROCEDURE — 99283 EMERGENCY DEPT VISIT LOW MDM: CPT

## 2021-03-11 PROCEDURE — 85025 COMPLETE CBC W/AUTO DIFF WBC: CPT | Performed by: NURSE PRACTITIONER

## 2021-03-11 PROCEDURE — 80053 COMPREHEN METABOLIC PANEL: CPT | Performed by: NURSE PRACTITIONER

## 2021-03-11 PROCEDURE — 99284 EMERGENCY DEPT VISIT MOD MDM: CPT | Mod: ,,, | Performed by: NURSE PRACTITIONER

## 2021-03-11 PROCEDURE — 99284 PR EMERGENCY DEPT VISIT,LEVEL IV: ICD-10-PCS | Mod: ,,, | Performed by: NURSE PRACTITIONER

## 2021-03-11 PROCEDURE — 81003 URINALYSIS AUTO W/O SCOPE: CPT | Performed by: NURSE PRACTITIONER

## 2021-03-11 PROCEDURE — 83690 ASSAY OF LIPASE: CPT | Performed by: NURSE PRACTITIONER

## 2021-03-11 RX ORDER — FAMOTIDINE 10 MG/1
10 TABLET ORAL 2 TIMES DAILY
COMMUNITY
End: 2021-04-22

## 2021-03-15 ENCOUNTER — LAB VISIT (OUTPATIENT)
Dept: LAB | Facility: HOSPITAL | Age: 31
End: 2021-03-15
Attending: INTERNAL MEDICINE
Payer: COMMERCIAL

## 2021-03-15 ENCOUNTER — OFFICE VISIT (OUTPATIENT)
Dept: GASTROENTEROLOGY | Facility: CLINIC | Age: 31
End: 2021-03-15
Payer: COMMERCIAL

## 2021-03-15 VITALS — BODY MASS INDEX: 27.31 KG/M2 | WEIGHT: 154.13 LBS | HEIGHT: 63 IN

## 2021-03-15 DIAGNOSIS — R53.83 FATIGUE, UNSPECIFIED TYPE: ICD-10-CM

## 2021-03-15 DIAGNOSIS — R10.13 DYSPEPSIA: Primary | ICD-10-CM

## 2021-03-15 DIAGNOSIS — R10.13 DYSPEPSIA: ICD-10-CM

## 2021-03-15 DIAGNOSIS — K21.9 GASTROESOPHAGEAL REFLUX DISEASE, UNSPECIFIED WHETHER ESOPHAGITIS PRESENT: ICD-10-CM

## 2021-03-15 PROCEDURE — 99999 PR PBB SHADOW E&M-EST. PATIENT-LVL III: ICD-10-PCS | Mod: PBBFAC,,, | Performed by: INTERNAL MEDICINE

## 2021-03-15 PROCEDURE — 99204 OFFICE O/P NEW MOD 45 MIN: CPT | Mod: S$GLB,,, | Performed by: INTERNAL MEDICINE

## 2021-03-15 PROCEDURE — 86677 HELICOBACTER PYLORI ANTIBODY: CPT | Performed by: INTERNAL MEDICINE

## 2021-03-15 PROCEDURE — 99204 PR OFFICE/OUTPT VISIT, NEW, LEVL IV, 45-59 MIN: ICD-10-PCS | Mod: S$GLB,,, | Performed by: INTERNAL MEDICINE

## 2021-03-15 PROCEDURE — 99999 PR PBB SHADOW E&M-EST. PATIENT-LVL III: CPT | Mod: PBBFAC,,, | Performed by: INTERNAL MEDICINE

## 2021-03-15 PROCEDURE — 83516 IMMUNOASSAY NONANTIBODY: CPT | Mod: 59 | Performed by: INTERNAL MEDICINE

## 2021-03-16 ENCOUNTER — PATIENT MESSAGE (OUTPATIENT)
Dept: GASTROENTEROLOGY | Facility: CLINIC | Age: 31
End: 2021-03-16

## 2021-03-16 ENCOUNTER — TELEPHONE (OUTPATIENT)
Dept: GASTROENTEROLOGY | Facility: CLINIC | Age: 31
End: 2021-03-16

## 2021-03-16 ENCOUNTER — HOSPITAL ENCOUNTER (OUTPATIENT)
Dept: RADIOLOGY | Facility: HOSPITAL | Age: 31
Discharge: HOME | End: 2021-03-16
Attending: INTERNAL MEDICINE
Payer: COMMERCIAL

## 2021-03-16 ENCOUNTER — TELEPHONE (OUTPATIENT)
Dept: SLEEP MEDICINE | Facility: CLINIC | Age: 31
End: 2021-03-16

## 2021-03-16 DIAGNOSIS — R10.13 DYSPEPSIA: ICD-10-CM

## 2021-03-16 PROCEDURE — 76700 US ABDOMEN COMPLETE: ICD-10-PCS | Mod: 26,,, | Performed by: RADIOLOGY

## 2021-03-16 PROCEDURE — 76700 US EXAM ABDOM COMPLETE: CPT | Mod: TC

## 2021-03-16 PROCEDURE — 76700 US EXAM ABDOM COMPLETE: CPT | Mod: 26,,, | Performed by: RADIOLOGY

## 2021-03-17 ENCOUNTER — PATIENT MESSAGE (OUTPATIENT)
Dept: GASTROENTEROLOGY | Facility: CLINIC | Age: 31
End: 2021-03-17

## 2021-03-17 ENCOUNTER — PATIENT MESSAGE (OUTPATIENT)
Dept: ENDOSCOPY | Facility: HOSPITAL | Age: 31
End: 2021-03-17

## 2021-03-17 DIAGNOSIS — B96.81 CHRONIC HELICOBACTER PYLORI GASTRITIS: Primary | ICD-10-CM

## 2021-03-17 DIAGNOSIS — K29.50 CHRONIC HELICOBACTER PYLORI GASTRITIS: Primary | ICD-10-CM

## 2021-03-17 LAB — H PYLORI IGG SERPL QL IA: POSITIVE

## 2021-03-17 RX ORDER — PANTOPRAZOLE SODIUM 40 MG/1
40 TABLET, DELAYED RELEASE ORAL 2 TIMES DAILY
Qty: 28 TABLET | Refills: 0 | Status: SHIPPED | OUTPATIENT
Start: 2021-03-17 | End: 2021-04-22

## 2021-03-17 RX ORDER — CLARITHROMYCIN 500 MG/1
500 TABLET, FILM COATED ORAL 2 TIMES DAILY
Qty: 28 TABLET | Refills: 0 | Status: SHIPPED | OUTPATIENT
Start: 2021-03-17 | End: 2021-03-31

## 2021-03-17 RX ORDER — AMOXICILLIN 500 MG/1
1000 CAPSULE ORAL 2 TIMES DAILY
Qty: 56 CAPSULE | Refills: 0 | Status: SHIPPED | OUTPATIENT
Start: 2021-03-17 | End: 2021-03-31

## 2021-03-18 ENCOUNTER — DOCUMENTATION ONLY (OUTPATIENT)
Dept: REHABILITATION | Facility: HOSPITAL | Age: 31
End: 2021-03-18

## 2021-03-18 LAB
GLIADIN PEPTIDE IGA SER-ACNC: 22 UNITS
GLIADIN PEPTIDE IGG SER-ACNC: 5 UNITS
IGA SERPL-MCNC: 379 MG/DL (ref 70–400)
TTG IGA SER-ACNC: 15 UNITS
TTG IGG SER-ACNC: 8 UNITS

## 2021-03-21 ENCOUNTER — PATIENT MESSAGE (OUTPATIENT)
Dept: GASTROENTEROLOGY | Facility: CLINIC | Age: 31
End: 2021-03-21

## 2021-03-22 ENCOUNTER — PATIENT MESSAGE (OUTPATIENT)
Dept: SLEEP MEDICINE | Facility: CLINIC | Age: 31
End: 2021-03-22

## 2021-03-26 ENCOUNTER — IMMUNIZATION (OUTPATIENT)
Dept: PRIMARY CARE CLINIC | Facility: CLINIC | Age: 31
End: 2021-03-26
Payer: COMMERCIAL

## 2021-03-26 DIAGNOSIS — Z23 NEED FOR VACCINATION: Primary | ICD-10-CM

## 2021-03-26 PROCEDURE — 91300 PR SARS-COV- 2 COVID-19 VACCINE, NO PRSV, 30MCG/0.3ML, IM: ICD-10-PCS | Mod: S$GLB,,, | Performed by: INTERNAL MEDICINE

## 2021-03-26 PROCEDURE — 0001A PR IMMUNIZ ADMIN, SARS-COV-2 COVID-19 VACC, 30MCG/0.3ML, 1ST DOSE: CPT | Mod: CV19,S$GLB,, | Performed by: INTERNAL MEDICINE

## 2021-03-26 PROCEDURE — 0001A PR IMMUNIZ ADMIN, SARS-COV-2 COVID-19 VACC, 30MCG/0.3ML, 1ST DOSE: ICD-10-PCS | Mod: CV19,S$GLB,, | Performed by: INTERNAL MEDICINE

## 2021-03-26 PROCEDURE — 91300 PR SARS-COV- 2 COVID-19 VACCINE, NO PRSV, 30MCG/0.3ML, IM: CPT | Mod: S$GLB,,, | Performed by: INTERNAL MEDICINE

## 2021-03-26 RX ADMIN — Medication 0.3 ML: at 04:03

## 2021-04-07 ENCOUNTER — PATIENT OUTREACH (OUTPATIENT)
Dept: ADMINISTRATIVE | Facility: OTHER | Age: 31
End: 2021-04-07

## 2021-04-07 ENCOUNTER — OFFICE VISIT (OUTPATIENT)
Dept: GASTROENTEROLOGY | Facility: CLINIC | Age: 31
End: 2021-04-07
Payer: COMMERCIAL

## 2021-04-07 VITALS
DIASTOLIC BLOOD PRESSURE: 71 MMHG | BODY MASS INDEX: 27.13 KG/M2 | WEIGHT: 153.13 LBS | HEIGHT: 63 IN | SYSTOLIC BLOOD PRESSURE: 114 MMHG | HEART RATE: 73 BPM

## 2021-04-07 DIAGNOSIS — R10.13 EPIGASTRIC PAIN: ICD-10-CM

## 2021-04-07 DIAGNOSIS — A04.8 H. PYLORI INFECTION: Primary | ICD-10-CM

## 2021-04-07 PROCEDURE — 99214 PR OFFICE/OUTPT VISIT, EST, LEVL IV, 30-39 MIN: ICD-10-PCS | Mod: S$GLB,,, | Performed by: STUDENT IN AN ORGANIZED HEALTH CARE EDUCATION/TRAINING PROGRAM

## 2021-04-07 PROCEDURE — 99214 OFFICE O/P EST MOD 30 MIN: CPT | Mod: S$GLB,,, | Performed by: STUDENT IN AN ORGANIZED HEALTH CARE EDUCATION/TRAINING PROGRAM

## 2021-04-07 PROCEDURE — 99999 PR PBB SHADOW E&M-EST. PATIENT-LVL III: CPT | Mod: PBBFAC,,, | Performed by: STUDENT IN AN ORGANIZED HEALTH CARE EDUCATION/TRAINING PROGRAM

## 2021-04-07 PROCEDURE — 99999 PR PBB SHADOW E&M-EST. PATIENT-LVL III: ICD-10-PCS | Mod: PBBFAC,,, | Performed by: STUDENT IN AN ORGANIZED HEALTH CARE EDUCATION/TRAINING PROGRAM

## 2021-04-11 ENCOUNTER — PATIENT MESSAGE (OUTPATIENT)
Dept: GASTROENTEROLOGY | Facility: CLINIC | Age: 31
End: 2021-04-11

## 2021-04-12 ENCOUNTER — PATIENT MESSAGE (OUTPATIENT)
Dept: GASTROENTEROLOGY | Facility: CLINIC | Age: 31
End: 2021-04-12

## 2021-04-12 ENCOUNTER — TELEPHONE (OUTPATIENT)
Dept: GASTROENTEROLOGY | Facility: CLINIC | Age: 31
End: 2021-04-12

## 2021-04-18 ENCOUNTER — IMMUNIZATION (OUTPATIENT)
Dept: PRIMARY CARE CLINIC | Facility: CLINIC | Age: 31
End: 2021-04-18
Payer: COMMERCIAL

## 2021-04-18 DIAGNOSIS — Z23 NEED FOR VACCINATION: Primary | ICD-10-CM

## 2021-04-18 PROCEDURE — 0002A PR IMMUNIZ ADMIN, SARS-COV-2 COVID-19 VACC, 30MCG/0.3ML, 2ND DOSE: CPT | Mod: CV19,S$GLB,, | Performed by: INTERNAL MEDICINE

## 2021-04-18 PROCEDURE — 91300 PR SARS-COV- 2 COVID-19 VACCINE, NO PRSV, 30MCG/0.3ML, IM: ICD-10-PCS | Mod: S$GLB,,, | Performed by: INTERNAL MEDICINE

## 2021-04-18 PROCEDURE — 0002A PR IMMUNIZ ADMIN, SARS-COV-2 COVID-19 VACC, 30MCG/0.3ML, 2ND DOSE: ICD-10-PCS | Mod: CV19,S$GLB,, | Performed by: INTERNAL MEDICINE

## 2021-04-18 PROCEDURE — 91300 PR SARS-COV- 2 COVID-19 VACCINE, NO PRSV, 30MCG/0.3ML, IM: CPT | Mod: S$GLB,,, | Performed by: INTERNAL MEDICINE

## 2021-04-18 RX ADMIN — Medication 0.3 ML: at 02:04

## 2021-04-22 ENCOUNTER — OFFICE VISIT (OUTPATIENT)
Dept: GASTROENTEROLOGY | Facility: CLINIC | Age: 31
End: 2021-04-22
Payer: COMMERCIAL

## 2021-04-22 VITALS — BODY MASS INDEX: 26.13 KG/M2 | WEIGHT: 147.5 LBS | HEIGHT: 63 IN

## 2021-04-22 DIAGNOSIS — R14.0 BLOATING SYMPTOM: ICD-10-CM

## 2021-04-22 DIAGNOSIS — R10.13 DYSPEPSIA: ICD-10-CM

## 2021-04-22 DIAGNOSIS — Z01.818 PRE-OP TESTING: ICD-10-CM

## 2021-04-22 DIAGNOSIS — A04.8 H. PYLORI INFECTION: ICD-10-CM

## 2021-04-22 DIAGNOSIS — B96.81 CHRONIC HELICOBACTER PYLORI GASTRITIS: Primary | ICD-10-CM

## 2021-04-22 DIAGNOSIS — K29.50 CHRONIC HELICOBACTER PYLORI GASTRITIS: Primary | ICD-10-CM

## 2021-04-22 PROCEDURE — 99999 PR PBB SHADOW E&M-EST. PATIENT-LVL III: ICD-10-PCS | Mod: PBBFAC,,, | Performed by: INTERNAL MEDICINE

## 2021-04-22 PROCEDURE — 99999 PR PBB SHADOW E&M-EST. PATIENT-LVL III: CPT | Mod: PBBFAC,,, | Performed by: INTERNAL MEDICINE

## 2021-04-22 PROCEDURE — 99214 OFFICE O/P EST MOD 30 MIN: CPT | Mod: S$GLB,,, | Performed by: INTERNAL MEDICINE

## 2021-04-22 PROCEDURE — 99214 PR OFFICE/OUTPT VISIT, EST, LEVL IV, 30-39 MIN: ICD-10-PCS | Mod: S$GLB,,, | Performed by: INTERNAL MEDICINE

## 2021-04-23 ENCOUNTER — LAB VISIT (OUTPATIENT)
Dept: LAB | Facility: HOSPITAL | Age: 31
End: 2021-04-23
Attending: INTERNAL MEDICINE
Payer: COMMERCIAL

## 2021-04-23 DIAGNOSIS — R10.13 DYSPEPSIA: ICD-10-CM

## 2021-04-23 DIAGNOSIS — R14.0 BLOATING SYMPTOM: ICD-10-CM

## 2021-04-23 DIAGNOSIS — A04.8 H. PYLORI INFECTION: ICD-10-CM

## 2021-04-23 DIAGNOSIS — K29.50 CHRONIC HELICOBACTER PYLORI GASTRITIS: ICD-10-CM

## 2021-04-23 DIAGNOSIS — B96.81 CHRONIC HELICOBACTER PYLORI GASTRITIS: ICD-10-CM

## 2021-04-23 PROCEDURE — 87338 HPYLORI STOOL AG IA: CPT | Performed by: INTERNAL MEDICINE

## 2021-04-26 ENCOUNTER — PATIENT MESSAGE (OUTPATIENT)
Dept: INTERNAL MEDICINE | Facility: CLINIC | Age: 31
End: 2021-04-26

## 2021-04-26 DIAGNOSIS — R05.9 COUGH: Primary | ICD-10-CM

## 2021-04-28 ENCOUNTER — PATIENT MESSAGE (OUTPATIENT)
Dept: INTERNAL MEDICINE | Facility: CLINIC | Age: 31
End: 2021-04-28

## 2021-04-28 ENCOUNTER — PATIENT MESSAGE (OUTPATIENT)
Dept: SLEEP MEDICINE | Facility: CLINIC | Age: 31
End: 2021-04-28

## 2021-05-01 ENCOUNTER — PATIENT MESSAGE (OUTPATIENT)
Dept: INTERNAL MEDICINE | Facility: CLINIC | Age: 31
End: 2021-05-01

## 2021-05-01 ENCOUNTER — PATIENT MESSAGE (OUTPATIENT)
Dept: RHEUMATOLOGY | Facility: CLINIC | Age: 31
End: 2021-05-01

## 2021-05-02 LAB — H PYLORI AG STL QL IA: NOT DETECTED

## 2021-05-04 ENCOUNTER — TELEPHONE (OUTPATIENT)
Dept: GASTROENTEROLOGY | Facility: CLINIC | Age: 31
End: 2021-05-04

## 2021-05-09 ENCOUNTER — LAB VISIT (OUTPATIENT)
Dept: URGENT CARE | Facility: CLINIC | Age: 31
End: 2021-05-09
Payer: COMMERCIAL

## 2021-05-09 DIAGNOSIS — Z01.818 PRE-OP TESTING: ICD-10-CM

## 2021-05-09 PROCEDURE — U0003 INFECTIOUS AGENT DETECTION BY NUCLEIC ACID (DNA OR RNA); SEVERE ACUTE RESPIRATORY SYNDROME CORONAVIRUS 2 (SARS-COV-2) (CORONAVIRUS DISEASE [COVID-19]), AMPLIFIED PROBE TECHNIQUE, MAKING USE OF HIGH THROUGHPUT TECHNOLOGIES AS DESCRIBED BY CMS-2020-01-R: HCPCS | Performed by: INTERNAL MEDICINE

## 2021-05-09 PROCEDURE — 99211 PR OFFICE/OUTPT VISIT, EST, LEVL I: ICD-10-PCS | Mod: S$GLB,,, | Performed by: PHYSICIAN ASSISTANT

## 2021-05-09 PROCEDURE — 99211 OFF/OP EST MAY X REQ PHY/QHP: CPT | Mod: S$GLB,,, | Performed by: PHYSICIAN ASSISTANT

## 2021-05-09 PROCEDURE — U0005 INFEC AGEN DETEC AMPLI PROBE: HCPCS | Performed by: INTERNAL MEDICINE

## 2021-05-10 ENCOUNTER — TELEPHONE (OUTPATIENT)
Dept: ENDOSCOPY | Facility: HOSPITAL | Age: 31
End: 2021-05-10

## 2021-05-10 LAB — SARS-COV-2 RNA RESP QL NAA+PROBE: NOT DETECTED

## 2021-05-12 ENCOUNTER — HOSPITAL ENCOUNTER (OUTPATIENT)
Facility: HOSPITAL | Age: 31
Discharge: HOME | End: 2021-05-12
Attending: INTERNAL MEDICINE | Admitting: INTERNAL MEDICINE
Payer: COMMERCIAL

## 2021-05-12 ENCOUNTER — PATIENT MESSAGE (OUTPATIENT)
Dept: INTERNAL MEDICINE | Facility: CLINIC | Age: 31
End: 2021-05-12

## 2021-05-12 ENCOUNTER — ANESTHESIA EVENT (OUTPATIENT)
Dept: ENDOSCOPY | Facility: HOSPITAL | Age: 31
End: 2021-05-12
Payer: COMMERCIAL

## 2021-05-12 ENCOUNTER — ANESTHESIA (OUTPATIENT)
Dept: ENDOSCOPY | Facility: HOSPITAL | Age: 31
End: 2021-05-12
Payer: COMMERCIAL

## 2021-05-12 VITALS
TEMPERATURE: 98 F | BODY MASS INDEX: 26.58 KG/M2 | HEART RATE: 60 BPM | WEIGHT: 150 LBS | SYSTOLIC BLOOD PRESSURE: 104 MMHG | OXYGEN SATURATION: 97 % | RESPIRATION RATE: 11 BRPM | DIASTOLIC BLOOD PRESSURE: 69 MMHG | HEIGHT: 63 IN

## 2021-05-12 DIAGNOSIS — R10.13 DYSPEPSIA: ICD-10-CM

## 2021-05-12 PROCEDURE — 43239 EGD BIOPSY SINGLE/MULTIPLE: CPT | Mod: ,,, | Performed by: INTERNAL MEDICINE

## 2021-05-12 PROCEDURE — 63600175 PHARM REV CODE 636 W HCPCS: Performed by: NURSE ANESTHETIST, CERTIFIED REGISTERED

## 2021-05-12 PROCEDURE — 25000003 PHARM REV CODE 250: Performed by: INTERNAL MEDICINE

## 2021-05-12 PROCEDURE — 88342 IMHCHEM/IMCYTCHM 1ST ANTB: CPT | Performed by: PATHOLOGY

## 2021-05-12 PROCEDURE — 88305 TISSUE EXAM BY PATHOLOGIST: CPT | Mod: 59 | Performed by: PATHOLOGY

## 2021-05-12 PROCEDURE — 43239 EGD BIOPSY SINGLE/MULTIPLE: CPT | Performed by: INTERNAL MEDICINE

## 2021-05-12 PROCEDURE — 43239 PR EGD, FLEX, W/BIOPSY, SGL/MULTI: ICD-10-PCS | Mod: ,,, | Performed by: INTERNAL MEDICINE

## 2021-05-12 PROCEDURE — 88305 TISSUE EXAM BY PATHOLOGIST: ICD-10-PCS | Mod: 26,,, | Performed by: PATHOLOGY

## 2021-05-12 PROCEDURE — 37000008 HC ANESTHESIA 1ST 15 MINUTES: Performed by: INTERNAL MEDICINE

## 2021-05-12 PROCEDURE — 88342 CHG IMMUNOCYTOCHEMISTRY: ICD-10-PCS | Mod: 26,,, | Performed by: PATHOLOGY

## 2021-05-12 PROCEDURE — 27201012 HC FORCEPS, HOT/COLD, DISP: Performed by: INTERNAL MEDICINE

## 2021-05-12 PROCEDURE — 25000003 PHARM REV CODE 250: Performed by: NURSE ANESTHETIST, CERTIFIED REGISTERED

## 2021-05-12 PROCEDURE — 37000009 HC ANESTHESIA EA ADD 15 MINS: Performed by: INTERNAL MEDICINE

## 2021-05-12 PROCEDURE — 88342 IMHCHEM/IMCYTCHM 1ST ANTB: CPT | Mod: 26,,, | Performed by: PATHOLOGY

## 2021-05-12 PROCEDURE — 88305 TISSUE EXAM BY PATHOLOGIST: CPT | Mod: 26,,, | Performed by: PATHOLOGY

## 2021-05-12 RX ORDER — PANTOPRAZOLE SODIUM 40 MG/1
40 TABLET, DELAYED RELEASE ORAL DAILY
Qty: 30 TABLET | Refills: 3 | Status: SHIPPED | OUTPATIENT
Start: 2021-05-12 | End: 2024-04-02

## 2021-05-12 RX ORDER — LIDOCAINE HYDROCHLORIDE 20 MG/ML
INJECTION INTRAVENOUS
Status: DISCONTINUED | OUTPATIENT
Start: 2021-05-12 | End: 2021-05-12

## 2021-05-12 RX ORDER — PROPOFOL 10 MG/ML
VIAL (ML) INTRAVENOUS CONTINUOUS PRN
Status: DISCONTINUED | OUTPATIENT
Start: 2021-05-12 | End: 2021-05-12

## 2021-05-12 RX ORDER — SODIUM CHLORIDE 0.9 % (FLUSH) 0.9 %
10 SYRINGE (ML) INJECTION
Status: DISCONTINUED | OUTPATIENT
Start: 2021-05-12 | End: 2021-05-12 | Stop reason: HOSPADM

## 2021-05-12 RX ORDER — PROPOFOL 10 MG/ML
VIAL (ML) INTRAVENOUS
Status: DISCONTINUED | OUTPATIENT
Start: 2021-05-12 | End: 2021-05-12

## 2021-05-12 RX ORDER — SODIUM CHLORIDE 9 MG/ML
INJECTION, SOLUTION INTRAVENOUS CONTINUOUS
Status: DISCONTINUED | OUTPATIENT
Start: 2021-05-12 | End: 2021-05-12 | Stop reason: HOSPADM

## 2021-05-12 RX ADMIN — PROPOFOL 150 MCG/KG/MIN: 10 INJECTION, EMULSION INTRAVENOUS at 01:05

## 2021-05-12 RX ADMIN — LIDOCAINE HYDROCHLORIDE 60 MG: 20 INJECTION, SOLUTION INTRAVENOUS at 01:05

## 2021-05-12 RX ADMIN — PROPOFOL 100 MG: 10 INJECTION, EMULSION INTRAVENOUS at 01:05

## 2021-05-12 RX ADMIN — SODIUM CHLORIDE: 0.9 INJECTION, SOLUTION INTRAVENOUS at 12:05

## 2021-05-13 ENCOUNTER — TELEPHONE (OUTPATIENT)
Dept: ENDOSCOPY | Facility: HOSPITAL | Age: 31
End: 2021-05-13

## 2021-05-19 LAB
FINAL PATHOLOGIC DIAGNOSIS: NORMAL
GROSS: NORMAL
Lab: NORMAL

## 2021-05-20 ENCOUNTER — TELEPHONE (OUTPATIENT)
Dept: GASTROENTEROLOGY | Facility: CLINIC | Age: 31
End: 2021-05-20

## 2021-06-14 ENCOUNTER — OFFICE VISIT (OUTPATIENT)
Dept: INTERNAL MEDICINE | Facility: CLINIC | Age: 31
End: 2021-06-14
Payer: COMMERCIAL

## 2021-06-14 VITALS
TEMPERATURE: 98 F | WEIGHT: 149.06 LBS | HEART RATE: 78 BPM | RESPIRATION RATE: 18 BRPM | BODY MASS INDEX: 26.41 KG/M2 | SYSTOLIC BLOOD PRESSURE: 118 MMHG | OXYGEN SATURATION: 97 % | DIASTOLIC BLOOD PRESSURE: 64 MMHG | HEIGHT: 63 IN

## 2021-06-14 DIAGNOSIS — R29.898 DECREASED STRENGTH OF TRUNK AND BACK: ICD-10-CM

## 2021-06-14 DIAGNOSIS — M53.84 DECREASED ROM OF THORACIC SPINE: Primary | ICD-10-CM

## 2021-06-14 DIAGNOSIS — R29.3 POSTURE IMBALANCE: ICD-10-CM

## 2021-06-14 PROCEDURE — 99999 PR PBB SHADOW E&M-EST. PATIENT-LVL IV: ICD-10-PCS | Mod: PBBFAC,,, | Performed by: FAMILY MEDICINE

## 2021-06-14 PROCEDURE — 99214 OFFICE O/P EST MOD 30 MIN: CPT | Mod: PBBFAC,PO | Performed by: FAMILY MEDICINE

## 2021-06-14 PROCEDURE — 99999 PR PBB SHADOW E&M-EST. PATIENT-LVL IV: CPT | Mod: PBBFAC,,, | Performed by: FAMILY MEDICINE

## 2021-06-14 PROCEDURE — 99214 OFFICE O/P EST MOD 30 MIN: CPT | Mod: S$PBB,,, | Performed by: FAMILY MEDICINE

## 2021-06-14 PROCEDURE — 99214 PR OFFICE/OUTPT VISIT, EST, LEVL IV, 30-39 MIN: ICD-10-PCS | Mod: S$PBB,,, | Performed by: FAMILY MEDICINE

## 2021-06-14 RX ORDER — METHOCARBAMOL 750 MG/1
750 TABLET, FILM COATED ORAL 4 TIMES DAILY PRN
Qty: 40 TABLET | Refills: 3 | Status: SHIPPED | OUTPATIENT
Start: 2021-06-14 | End: 2021-06-24

## 2021-06-21 ENCOUNTER — TELEPHONE (OUTPATIENT)
Dept: SPINE | Facility: CLINIC | Age: 31
End: 2021-06-21

## 2021-06-21 ENCOUNTER — PATIENT MESSAGE (OUTPATIENT)
Dept: SPINE | Facility: CLINIC | Age: 31
End: 2021-06-21

## 2021-06-23 ENCOUNTER — PATIENT MESSAGE (OUTPATIENT)
Dept: SPINE | Facility: CLINIC | Age: 31
End: 2021-06-23

## 2021-06-23 ENCOUNTER — TELEPHONE (OUTPATIENT)
Dept: SPINE | Facility: CLINIC | Age: 31
End: 2021-06-23

## 2021-06-26 ENCOUNTER — HOSPITAL ENCOUNTER (EMERGENCY)
Facility: HOSPITAL | Age: 31
Discharge: HOME | End: 2021-06-26
Attending: EMERGENCY MEDICINE
Payer: COMMERCIAL

## 2021-06-26 VITALS
HEIGHT: 63 IN | SYSTOLIC BLOOD PRESSURE: 111 MMHG | RESPIRATION RATE: 20 BRPM | DIASTOLIC BLOOD PRESSURE: 69 MMHG | BODY MASS INDEX: 26.22 KG/M2 | WEIGHT: 148 LBS | HEART RATE: 66 BPM | OXYGEN SATURATION: 98 % | TEMPERATURE: 99 F

## 2021-06-26 DIAGNOSIS — R10.13 EPIGASTRIC PAIN: Primary | ICD-10-CM

## 2021-06-26 LAB
ALBUMIN SERPL BCP-MCNC: 4.4 G/DL (ref 3.5–5.2)
ALP SERPL-CCNC: 69 U/L (ref 55–135)
ALT SERPL W/O P-5'-P-CCNC: 30 U/L (ref 10–44)
ANION GAP SERPL CALC-SCNC: 11 MMOL/L (ref 8–16)
AST SERPL-CCNC: 21 U/L (ref 10–40)
BASOPHILS # BLD AUTO: 0.02 K/UL (ref 0–0.2)
BASOPHILS NFR BLD: 0.4 % (ref 0–1.9)
BILIRUB SERPL-MCNC: 0.5 MG/DL (ref 0.1–1)
BILIRUB UR QL STRIP: NEGATIVE
BUN SERPL-MCNC: 15 MG/DL (ref 6–20)
CALCIUM SERPL-MCNC: 9.7 MG/DL (ref 8.7–10.5)
CHLORIDE SERPL-SCNC: 104 MMOL/L (ref 95–110)
CLARITY UR REFRACT.AUTO: CLEAR
CO2 SERPL-SCNC: 26 MMOL/L (ref 23–29)
COLOR UR AUTO: YELLOW
CREAT SERPL-MCNC: 0.8 MG/DL (ref 0.5–1.4)
DIFFERENTIAL METHOD: NORMAL
EOSINOPHIL # BLD AUTO: 0.2 K/UL (ref 0–0.5)
EOSINOPHIL NFR BLD: 3.8 % (ref 0–8)
ERYTHROCYTE [DISTWIDTH] IN BLOOD BY AUTOMATED COUNT: 12.6 % (ref 11.5–14.5)
EST. GFR  (AFRICAN AMERICAN): >60 ML/MIN/1.73 M^2
EST. GFR  (NON AFRICAN AMERICAN): >60 ML/MIN/1.73 M^2
GLUCOSE SERPL-MCNC: 99 MG/DL (ref 70–110)
GLUCOSE UR QL STRIP: NEGATIVE
HCT VFR BLD AUTO: 42.6 % (ref 40–54)
HGB BLD-MCNC: 14.3 G/DL (ref 14–18)
HGB UR QL STRIP: NEGATIVE
IMM GRANULOCYTES # BLD AUTO: 0.01 K/UL (ref 0–0.04)
IMM GRANULOCYTES NFR BLD AUTO: 0.2 % (ref 0–0.5)
KETONES UR QL STRIP: NEGATIVE
LEUKOCYTE ESTERASE UR QL STRIP: NEGATIVE
LIPASE SERPL-CCNC: 32 U/L (ref 4–60)
LYMPHOCYTES # BLD AUTO: 1.8 K/UL (ref 1–4.8)
LYMPHOCYTES NFR BLD: 33.6 % (ref 18–48)
MCH RBC QN AUTO: 29.5 PG (ref 27–31)
MCHC RBC AUTO-ENTMCNC: 33.6 G/DL (ref 32–36)
MCV RBC AUTO: 88 FL (ref 82–98)
MONOCYTES # BLD AUTO: 0.3 K/UL (ref 0.3–1)
MONOCYTES NFR BLD: 6.4 % (ref 4–15)
NEUTROPHILS # BLD AUTO: 3 K/UL (ref 1.8–7.7)
NEUTROPHILS NFR BLD: 55.6 % (ref 38–73)
NITRITE UR QL STRIP: NEGATIVE
NRBC BLD-RTO: 0 /100 WBC
PH UR STRIP: 6 [PH] (ref 5–8)
PLATELET # BLD AUTO: 265 K/UL (ref 150–450)
PMV BLD AUTO: 10 FL (ref 9.2–12.9)
POTASSIUM SERPL-SCNC: 3.9 MMOL/L (ref 3.5–5.1)
PROT SERPL-MCNC: 8.3 G/DL (ref 6–8.4)
PROT UR QL STRIP: NEGATIVE
RBC # BLD AUTO: 4.84 M/UL (ref 4.6–6.2)
SODIUM SERPL-SCNC: 141 MMOL/L (ref 136–145)
SP GR UR STRIP: 1.01 (ref 1–1.03)
URN SPEC COLLECT METH UR: NORMAL
WBC # BLD AUTO: 5.3 K/UL (ref 3.9–12.7)

## 2021-06-26 PROCEDURE — 99283 EMERGENCY DEPT VISIT LOW MDM: CPT

## 2021-06-26 PROCEDURE — 83690 ASSAY OF LIPASE: CPT | Performed by: PHYSICIAN ASSISTANT

## 2021-06-26 PROCEDURE — 25000003 PHARM REV CODE 250: Performed by: PHYSICIAN ASSISTANT

## 2021-06-26 PROCEDURE — 80053 COMPREHEN METABOLIC PANEL: CPT | Performed by: PHYSICIAN ASSISTANT

## 2021-06-26 PROCEDURE — 81003 URINALYSIS AUTO W/O SCOPE: CPT | Performed by: PHYSICIAN ASSISTANT

## 2021-06-26 PROCEDURE — 99284 EMERGENCY DEPT VISIT MOD MDM: CPT | Mod: ,,, | Performed by: PHYSICIAN ASSISTANT

## 2021-06-26 PROCEDURE — 99284 PR EMERGENCY DEPT VISIT,LEVEL IV: ICD-10-PCS | Mod: ,,, | Performed by: PHYSICIAN ASSISTANT

## 2021-06-26 PROCEDURE — 85025 COMPLETE CBC W/AUTO DIFF WBC: CPT | Performed by: PHYSICIAN ASSISTANT

## 2021-06-26 RX ORDER — LIDOCAINE HYDROCHLORIDE 20 MG/ML
5 SOLUTION OROPHARYNGEAL
Status: COMPLETED | OUTPATIENT
Start: 2021-06-26 | End: 2021-06-26

## 2021-06-26 RX ORDER — MAG HYDROX/ALUMINUM HYD/SIMETH 200-200-20
30 SUSPENSION, ORAL (FINAL DOSE FORM) ORAL
Qty: 769 ML | Refills: 0 | Status: SHIPPED | OUTPATIENT
Start: 2021-06-26 | End: 2024-04-02

## 2021-06-26 RX ORDER — MAG HYDROX/ALUMINUM HYD/SIMETH 200-200-20
30 SUSPENSION, ORAL (FINAL DOSE FORM) ORAL
Qty: 769 ML | Refills: 0 | Status: SHIPPED | OUTPATIENT
Start: 2021-06-26 | End: 2021-06-26 | Stop reason: SDUPTHER

## 2021-06-26 RX ORDER — MAG HYDROX/ALUMINUM HYD/SIMETH 200-200-20
30 SUSPENSION, ORAL (FINAL DOSE FORM) ORAL
Status: COMPLETED | OUTPATIENT
Start: 2021-06-26 | End: 2021-06-26

## 2021-06-26 RX ADMIN — ALUMINUM HYDROXIDE, MAGNESIUM HYDROXIDE, AND SIMETHICONE 30 ML: 200; 200; 20 SUSPENSION ORAL at 07:06

## 2021-06-26 RX ADMIN — LIDOCAINE HYDROCHLORIDE 5 ML: 20 SOLUTION ORAL; TOPICAL at 07:06

## 2021-07-10 ENCOUNTER — PATIENT MESSAGE (OUTPATIENT)
Dept: GASTROENTEROLOGY | Facility: CLINIC | Age: 31
End: 2021-07-10

## 2021-07-14 ENCOUNTER — PATIENT OUTREACH (OUTPATIENT)
Dept: ADMINISTRATIVE | Facility: OTHER | Age: 31
End: 2021-07-14

## 2021-07-15 ENCOUNTER — OFFICE VISIT (OUTPATIENT)
Dept: RHEUMATOLOGY | Facility: CLINIC | Age: 31
End: 2021-07-15
Payer: COMMERCIAL

## 2021-07-15 VITALS
DIASTOLIC BLOOD PRESSURE: 74 MMHG | WEIGHT: 152.75 LBS | SYSTOLIC BLOOD PRESSURE: 119 MMHG | BODY MASS INDEX: 27.06 KG/M2 | HEART RATE: 74 BPM

## 2021-07-15 DIAGNOSIS — G89.4 CHRONIC PAIN SYNDROME: Primary | ICD-10-CM

## 2021-07-15 PROCEDURE — 99214 PR OFFICE/OUTPT VISIT, EST, LEVL IV, 30-39 MIN: ICD-10-PCS | Mod: S$GLB,,, | Performed by: INTERNAL MEDICINE

## 2021-07-15 PROCEDURE — 99999 PR PBB SHADOW E&M-EST. PATIENT-LVL II: ICD-10-PCS | Mod: PBBFAC,,, | Performed by: INTERNAL MEDICINE

## 2021-07-15 PROCEDURE — 99214 OFFICE O/P EST MOD 30 MIN: CPT | Mod: S$GLB,,, | Performed by: INTERNAL MEDICINE

## 2021-07-15 PROCEDURE — 99999 PR PBB SHADOW E&M-EST. PATIENT-LVL II: CPT | Mod: PBBFAC,,, | Performed by: INTERNAL MEDICINE

## 2021-07-18 ENCOUNTER — PATIENT MESSAGE (OUTPATIENT)
Dept: RHEUMATOLOGY | Facility: CLINIC | Age: 31
End: 2021-07-18

## 2021-07-18 DIAGNOSIS — G89.4 CHRONIC PAIN SYNDROME: Primary | ICD-10-CM

## 2021-07-20 ENCOUNTER — PATIENT MESSAGE (OUTPATIENT)
Dept: RHEUMATOLOGY | Facility: CLINIC | Age: 31
End: 2021-07-20

## 2021-07-20 DIAGNOSIS — G89.4 CHRONIC PAIN SYNDROME: ICD-10-CM

## 2021-07-20 RX ORDER — DULOXETIN HYDROCHLORIDE 30 MG/1
30 CAPSULE, DELAYED RELEASE ORAL DAILY
Qty: 30 CAPSULE | Refills: 0 | Status: SHIPPED | OUTPATIENT
Start: 2021-07-20 | End: 2021-07-20

## 2021-07-20 RX ORDER — DULOXETIN HYDROCHLORIDE 30 MG/1
30 CAPSULE, DELAYED RELEASE ORAL DAILY
Qty: 30 CAPSULE | Refills: 5 | Status: SHIPPED | OUTPATIENT
Start: 2021-07-20 | End: 2022-05-26

## 2021-09-13 ENCOUNTER — OFFICE VISIT (OUTPATIENT)
Dept: PULMONOLOGY | Facility: CLINIC | Age: 31
End: 2021-09-13
Payer: COMMERCIAL

## 2021-09-13 DIAGNOSIS — R06.02 SHORTNESS OF BREATH: Primary | ICD-10-CM

## 2021-09-13 PROCEDURE — 99443 PR PHYSICIAN TELEPHONE EVALUATION 21-30 MIN: CPT | Mod: 95,,, | Performed by: INTERNAL MEDICINE

## 2021-09-13 PROCEDURE — 99443 PR PHYSICIAN TELEPHONE EVALUATION 21-30 MIN: ICD-10-PCS | Mod: 95,,, | Performed by: INTERNAL MEDICINE

## 2021-10-13 ENCOUNTER — HOSPITAL ENCOUNTER (OUTPATIENT)
Dept: PULMONOLOGY | Facility: HOSPITAL | Age: 31
Discharge: HOME | End: 2021-10-13
Attending: INTERNAL MEDICINE
Payer: COMMERCIAL

## 2021-10-13 ENCOUNTER — HOSPITAL ENCOUNTER (OUTPATIENT)
Dept: RADIOLOGY | Facility: HOSPITAL | Age: 31
Discharge: HOME | End: 2021-10-13
Attending: INTERNAL MEDICINE
Payer: COMMERCIAL

## 2021-10-13 DIAGNOSIS — R06.02 SHORTNESS OF BREATH: ICD-10-CM

## 2021-10-13 PROCEDURE — 71046 X-RAY EXAM CHEST 2 VIEWS: CPT | Mod: 26,,, | Performed by: RADIOLOGY

## 2021-10-13 PROCEDURE — 94010 BREATHING CAPACITY TEST: CPT

## 2021-10-13 PROCEDURE — 71046 X-RAY EXAM CHEST 2 VIEWS: CPT | Mod: TC,FY

## 2021-10-13 PROCEDURE — 71046 XR CHEST PA AND LATERAL: ICD-10-PCS | Mod: 26,,, | Performed by: RADIOLOGY

## 2021-11-09 LAB
BRPFT: ABNORMAL
BRPFT: ABNORMAL
FEF 25 75 LLN: 2.86
FEF 25 75 PRE REF: 71.5 %
FEF 25 75 REF: 4.57
FEV1 FVC LLN: 76
FEV1 FVC PRE REF: 94.6 %
FEV1 FVC REF: 86
FEV1 LLN: 2.73
FEV1 PRE REF: 89.4 %
FEV1 REF: 3.46
FVC LLN: 3.21
FVC PRE REF: 94.4 %
FVC REF: 4.03
PEF LLN: 6.96
PEF PRE REF: 66.2 %
PEF REF: 8.95
PRE FEF 25 75: 3.27 L/S (ref 2.86–6.28)
PRE FET 100: 7.63 SEC
PRE FEV1 FVC: 81.27 % (ref 76.26–95.55)
PRE FEV1: 3.09 L (ref 2.73–4.18)
PRE FVC: 3.8 L (ref 3.21–4.84)
PRE PEF: 5.93 L/S (ref 6.96–10.94)

## 2021-12-30 ENCOUNTER — LAB VISIT (OUTPATIENT)
Dept: PRIMARY CARE CLINIC | Facility: OTHER | Age: 31
End: 2021-12-30
Payer: COMMERCIAL

## 2021-12-30 DIAGNOSIS — Z11.52 ENCOUNTER FOR SCREENING FOR COVID-19: Primary | ICD-10-CM

## 2021-12-30 PROCEDURE — U0003 INFECTIOUS AGENT DETECTION BY NUCLEIC ACID (DNA OR RNA); SEVERE ACUTE RESPIRATORY SYNDROME CORONAVIRUS 2 (SARS-COV-2) (CORONAVIRUS DISEASE [COVID-19]), AMPLIFIED PROBE TECHNIQUE, MAKING USE OF HIGH THROUGHPUT TECHNOLOGIES AS DESCRIBED BY CMS-2020-01-R: HCPCS | Mod: ST72

## 2022-01-04 ENCOUNTER — TELEPHONE (OUTPATIENT)
Dept: GASTROENTEROLOGY | Facility: CLINIC | Age: 32
End: 2022-01-04
Payer: COMMERCIAL

## 2022-01-04 LAB
SARS-COV-2 RNA RESP QL NAA+PROBE: DETECTED
SARS-COV-2- CYCLE NUMBER: 22

## 2022-01-04 NOTE — TELEPHONE ENCOUNTER
----- Message from Emerson Fontaine MD sent at 1/4/2022  8:33 AM CST -----  Not sure why this covid results came to me,  but is positive  Please inform, needs to follow cdc guidelines on quarantine  Follow up with pcp

## 2022-01-05 ENCOUNTER — PATIENT MESSAGE (OUTPATIENT)
Dept: INTERNAL MEDICINE | Facility: CLINIC | Age: 32
End: 2022-01-05
Payer: COMMERCIAL

## 2022-01-13 ENCOUNTER — LAB VISIT (OUTPATIENT)
Dept: PRIMARY CARE CLINIC | Facility: CLINIC | Age: 32
End: 2022-01-13
Payer: COMMERCIAL

## 2022-01-13 DIAGNOSIS — Z20.822 CONTACT WITH AND (SUSPECTED) EXPOSURE TO COVID-19: ICD-10-CM

## 2022-01-13 LAB
CTP QC/QA: YES
SARS-COV-2 AG RESP QL IA.RAPID: NEGATIVE

## 2022-01-13 PROCEDURE — 87811 SARS-COV-2 COVID19 W/OPTIC: CPT

## 2022-01-20 ENCOUNTER — PATIENT MESSAGE (OUTPATIENT)
Dept: INTERNAL MEDICINE | Facility: CLINIC | Age: 32
End: 2022-01-20
Payer: COMMERCIAL

## 2022-01-20 DIAGNOSIS — G47.33 OSA (OBSTRUCTIVE SLEEP APNEA): Primary | ICD-10-CM

## 2022-01-21 NOTE — TELEPHONE ENCOUNTER
CPAP was originally ordered by Sleep Medicine in March 2021 and order was recent to the patient in October.  I have reprinted the order for the patient.  Please have the patient  the order and bring it to [facility] to obtain the machine.  Please inform the patient.  Thank you.

## 2022-01-21 NOTE — TELEPHONE ENCOUNTER
""Therapy for the mild sleep apnea seen in this study could be considered."    The above recommendation is on the sleep study report.    Please advise.  "

## 2022-01-22 ENCOUNTER — PATIENT MESSAGE (OUTPATIENT)
Dept: INTERNAL MEDICINE | Facility: CLINIC | Age: 32
End: 2022-01-22
Payer: COMMERCIAL

## 2022-01-31 ENCOUNTER — PATIENT MESSAGE (OUTPATIENT)
Dept: SLEEP MEDICINE | Facility: CLINIC | Age: 32
End: 2022-01-31
Payer: COMMERCIAL

## 2022-02-02 ENCOUNTER — PATIENT OUTREACH (OUTPATIENT)
Dept: ADMINISTRATIVE | Facility: OTHER | Age: 32
End: 2022-02-02
Payer: COMMERCIAL

## 2022-02-02 ENCOUNTER — PATIENT MESSAGE (OUTPATIENT)
Dept: SLEEP MEDICINE | Facility: CLINIC | Age: 32
End: 2022-02-02
Payer: COMMERCIAL

## 2022-03-03 NOTE — PROGRESS NOTES
Established Patient - Audio Only Telehealth Visit   The reason for the audio only service rather than synchronous audio and video virtual visit was related to technical difficulties or patient preference/necessity.     Each patient to whom I provide medical services by telemedicine is:  (1) informed of the relationship between the physician and patient and the respective role of any other health care provider with respect to management of the patient; and (2) notified that they may decline to receive medical services by telemedicine and may withdraw from such care at any time. Patient verbally consented to receive this service via voice-only telephone call.     This service was not originating from a related E/M service provided within the previous 7 days nor will  to an E/M service or procedure within the next 24 hours or my soonest available appointment.  Prevailing standard of care was able to be met in this audio-only visit.    Visit type: Virtual visit with audio only (telephone)  Total time spent with patient: 45 minute  Last Visit: 9/13/21 (televisit)  Chief Complaint 32 yo M referred for lung check up     Present Illness          Wants his lungs checked up.  Notes occasional dyspnea when going to sleep or sometime waking him up.  Feells it is intermittent and better now.  Seen by rheumatol for chornic pain all over.  ? Firbromyalgia.  Had some chest pain and saw cardiology.  CXR reportedly normal.  Had sleep study that showed mild sleep disordered breathing.  Referrred to sleep medicine and sounds like he had some sort of home sleep stuidy but did not follow up.   Not felt to have ifnlammatory arthritis dz.  Being followed by GI for epigastric pain.  No dyspnea on exertion is present.  Cough is present productive of   sputum.  There is no history of hemoptysis.  There is no prior history of pneumonia, bronchitis, emphysema, TB, pneumothorax, rib fractures.  There is no history of asthma, allergies,   "He notes Hx of allergic rhinitis but doesn't know what it relates to.  .  There is no history of heart disease, MI, or a "weak heart".       No problems with COVID but hasn't had vaccine.      3/7/22-  CXR, spirometry NL.  [facility]  Had COIVD in 12/21.  Tested positive.  His sister was ill. Not hospitalized. He had some fever x 2d days.  Had dry cough.  After 2 days Sx resolved.  Lost smell.  After 1 week, everything resolved.  Smell is back.    Is in process of working up with sleep medicine.       Past History  Medical    LIZZETH sxs intermittent. On no meds for LIZZETH     Surgical         Medications     Allergies- No known drug allergies     Family History  There is no family history of pulmonary disease.     Social History  Tobacco: Patient has a history of cigarette smoking at 1 cig in every 2-3 mo.      Alcohol:  Other drugs:  Occupational: Patient has no prior working history of exposure to asbestos or silica.  No history of toxic exposures.     Review of Systems  Gen:  no changes in weight, fever, night sweats   HEENT:  no H/A, trauma syncope, visual loss, diplopia; ears:  no new hearing deficit or deafness   Nasal: no discharge, polyps, obstruction, epistaxis, acute or chronic congestion   Throat:  no sores or thrush, new hoarseness, voice changes, URI         Neck: no swelling/stiffness, adenopathy, thyroid disease, prior tracheotomy  Resp: See above  CV: no CP, CHF, peripheral edema, ascites, peripheral vascular disease, cyanosis  GI: no PUD, +GERD, GIB, appetite change, dysphagia, N/V, diarrhea, hepatitis, gallstones, cirrhosis  Renal: normal kidney function, no hematuria, no renal stones, no frequent UTIs   Ext/MS: no arthralgia, no arthritis, no myalgia, no joint stiffness, no pain, no swelling  Neuro:  no change in speech & swallowing; no motor weakness, no seizures, no CVA, no TIA  3/7/22  sAo2 98%RA  HEENT- NL  Lungs clear  Cor RR.  No M  No edema, " clubbing     Radiology  5/11/20-  CXR- COMPARISON:  None     FINDINGS:  No consolidation, pleural effusion or pneumothorax.     Cardiomediastinal silhouette is unremarkable.     Impression:     No acute findings in the chest.   9/13/21-  CXR. COMPARISON:  05/11/2020     FINDINGS:  No significant change from prior.  No lung consolidation.  No pleural effusion or pneumothorax.  Heart size within normal limits.  Visualized osseous structures grossly intact.  Pulmonary Function Testing   10/13/21-  Spirometry-  FVC 3.80 (94% pred), FEV1 3.09 (80% pred), ratio 81%.  Nl no obstruction.  Other Labs  2/1/21-  Echo  · The left ventricle is normal in size with normal systolic function. The estimated ejection fraction is 60%  · Normal right ventricular size with normal right ventricular systolic function.  · Normal left ventricular diastolic function.  · Bicuspid aortic valve with normal leaflet mobility and mild aortic regurgitation.  · The estimated PA systolic pressure is 39 mmHg.  · Normal central venous pressure (3 mmHg).        Medical Decision Making/Problems/Assessment  Nocturnal SOB.  Likely due to LIZZETH.  Will order a CXR and spirometry with retunr visit in 1 mo.  Continue GI f/u.  Has not had inflammatory markers (sed rate, CRP)  Had negative COVID test 2020 but no COVID abs.  (? Long COVID)     Plan   Reassurance./  Keep appt with sleep medicine.    Barrington Chong MD

## 2022-03-07 ENCOUNTER — OFFICE VISIT (OUTPATIENT)
Dept: PULMONOLOGY | Facility: CLINIC | Age: 32
End: 2022-03-07
Payer: COMMERCIAL

## 2022-03-07 VITALS
DIASTOLIC BLOOD PRESSURE: 72 MMHG | BODY MASS INDEX: 27.92 KG/M2 | WEIGHT: 151.69 LBS | HEIGHT: 62 IN | HEART RATE: 81 BPM | TEMPERATURE: 99 F | SYSTOLIC BLOOD PRESSURE: 118 MMHG | RESPIRATION RATE: 18 BRPM | OXYGEN SATURATION: 98 %

## 2022-03-07 DIAGNOSIS — R06.02 SHORTNESS OF BREATH: Primary | ICD-10-CM

## 2022-03-07 PROCEDURE — 99999 PR PBB SHADOW E&M-EST. PATIENT-LVL III: CPT | Mod: PBBFAC,,, | Performed by: INTERNAL MEDICINE

## 2022-03-07 PROCEDURE — 99999 PR PBB SHADOW E&M-EST. PATIENT-LVL III: ICD-10-PCS | Mod: PBBFAC,,, | Performed by: INTERNAL MEDICINE

## 2022-03-07 PROCEDURE — 99499 NO LOS: ICD-10-PCS | Mod: S$GLB,,, | Performed by: INTERNAL MEDICINE

## 2022-03-07 PROCEDURE — 99499 UNLISTED E&M SERVICE: CPT | Mod: S$GLB,,, | Performed by: INTERNAL MEDICINE

## 2022-04-11 ENCOUNTER — PATIENT OUTREACH (OUTPATIENT)
Dept: ADMINISTRATIVE | Facility: OTHER | Age: 32
End: 2022-04-11
Payer: COMMERCIAL

## 2022-04-11 NOTE — PROGRESS NOTES
Care Everywhere: updated  Immunization: updated  Health Maintenance: updated  Media Review:   Legacy Review:   DIS:  Order placed:   Upcoming appts:  EFAX:  Task Tickets:  Referral

## 2022-05-26 ENCOUNTER — OFFICE VISIT (OUTPATIENT)
Dept: PRIMARY CARE CLINIC | Facility: CLINIC | Age: 32
End: 2022-05-26
Payer: COMMERCIAL

## 2022-05-26 VITALS
SYSTOLIC BLOOD PRESSURE: 114 MMHG | OXYGEN SATURATION: 99 % | HEIGHT: 62 IN | HEART RATE: 89 BPM | BODY MASS INDEX: 27.67 KG/M2 | WEIGHT: 150.38 LBS | TEMPERATURE: 99 F | DIASTOLIC BLOOD PRESSURE: 60 MMHG

## 2022-05-26 DIAGNOSIS — R29.898 DECREASED STRENGTH OF TRUNK AND BACK: ICD-10-CM

## 2022-05-26 DIAGNOSIS — G47.33 OSA (OBSTRUCTIVE SLEEP APNEA): ICD-10-CM

## 2022-05-26 DIAGNOSIS — R53.83 FATIGUE, UNSPECIFIED TYPE: ICD-10-CM

## 2022-05-26 DIAGNOSIS — R29.3 POSTURE IMBALANCE: ICD-10-CM

## 2022-05-26 DIAGNOSIS — R10.13 DYSPEPSIA: Primary | ICD-10-CM

## 2022-05-26 PROCEDURE — 99214 OFFICE O/P EST MOD 30 MIN: CPT | Mod: S$GLB,,, | Performed by: FAMILY MEDICINE

## 2022-05-26 PROCEDURE — 99999 PR PBB SHADOW E&M-EST. PATIENT-LVL III: CPT | Mod: PBBFAC,,, | Performed by: FAMILY MEDICINE

## 2022-05-26 PROCEDURE — 99214 PR OFFICE/OUTPT VISIT, EST, LEVL IV, 30-39 MIN: ICD-10-PCS | Mod: S$GLB,,, | Performed by: FAMILY MEDICINE

## 2022-05-26 PROCEDURE — 99999 PR PBB SHADOW E&M-EST. PATIENT-LVL III: ICD-10-PCS | Mod: PBBFAC,,, | Performed by: FAMILY MEDICINE

## 2022-05-26 RX ORDER — DICLOFENAC SODIUM 75 MG/1
75 TABLET, DELAYED RELEASE ORAL 2 TIMES DAILY
Qty: 20 TABLET | Refills: 0 | Status: SHIPPED | OUTPATIENT
Start: 2022-05-26 | End: 2024-04-02

## 2022-05-26 NOTE — PROGRESS NOTES
Subjective:   Patient ID: Phoenix Jones is a 31 y.o. male.    Chief Complaint: Chest Pain      HPI    32 yo male here w wife with multiple complaints.     Chief complaint is chest wall pain. Once went to ER and was cleared--ie, no ACS-- and was referred to card and had normal card hernandez.    Also with multiple complaints of musculosk pain. reports  Low back hurts   Upper back hurts  Both arms hurt     Left elbow aches at night if the arm is not covered and he feels like it gets cold in the elbow. Symptoms going for few months     Fatigue+   Insomnia at times only   Anxiety +     Both wrists hurt     No swelling  No stiffness        Patient queried and denies any further complaints.    ALLERGIES AND MEDICATIONS: updated and reviewed.  Review of patient's allergies indicates:  No Known Allergies    Current Outpatient Medications:     ergocalciferol (ERGOCALCIFEROL) 50,000 unit Cap, TAKE 1 CAPSULE BY MOUTH ONE TIME PER WEEK, Disp: 12 capsule, Rfl: 0    aluminum-magnesium hydroxide-simethicone (MAALOX) 200-200-20 mg/5 mL Susp, Take 30 mLs by mouth 4 (four) times daily before meals and nightly. (Patient not taking: No sig reported), Disp: 769 mL, Rfl: 0    diclofenac (VOLTAREN) 75 MG EC tablet, Take 1 tablet (75 mg total) by mouth 2 (two) times daily. For pain and inflammation for 5-10 days. Take w food and water, Disp: 20 tablet, Rfl: 0    pantoprazole (PROTONIX) 40 MG tablet, Take 1 tablet (40 mg total) by mouth once daily. (Patient not taking: Reported on 5/26/2022), Disp: 30 tablet, Rfl: 3    Review of Systems   Constitutional: Negative for activity change, appetite change, chills, diaphoresis, fatigue, fever and unexpected weight change.   HENT: Negative for congestion, ear discharge, ear pain, facial swelling, hearing loss, nosebleeds, postnasal drip, rhinorrhea, sinus pressure, sneezing, sore throat, tinnitus, trouble swallowing and voice change.    Eyes: Negative for photophobia, pain, discharge, redness, itching and  visual disturbance.   Respiratory: Negative for cough, chest tightness, shortness of breath and wheezing.    Cardiovascular: Negative for palpitations and leg swelling.   Gastrointestinal: Negative for abdominal distention, abdominal pain, anal bleeding, blood in stool, constipation, diarrhea, nausea, rectal pain and vomiting.   Endocrine: Negative for cold intolerance, heat intolerance, polydipsia, polyphagia and polyuria.   Genitourinary: Negative for difficulty urinating, dysuria and flank pain.   Musculoskeletal: Positive for arthralgias. Negative for back pain, joint swelling, myalgias and neck pain.   Skin: Negative for rash.   Neurological: Negative for dizziness, tremors, seizures, syncope, speech difficulty, weakness, light-headedness, numbness and headaches.   Psychiatric/Behavioral: Positive for sleep disturbance. Negative for behavioral problems, confusion, decreased concentration, dysphoric mood and suicidal ideas. The patient is nervous/anxious. The patient is not hyperactive.        Lab Results   Component Value Date    WBC 5.30 06/26/2021    HGB 14.3 06/26/2021    HCT 42.6 06/26/2021    MCV 88 06/26/2021     06/26/2021         CMP  Sodium   Date Value Ref Range Status   06/26/2021 141 136 - 145 mmol/L Final     Potassium   Date Value Ref Range Status   06/26/2021 3.9 3.5 - 5.1 mmol/L Final     Chloride   Date Value Ref Range Status   06/26/2021 104 95 - 110 mmol/L Final     CO2   Date Value Ref Range Status   06/26/2021 26 23 - 29 mmol/L Final     Glucose   Date Value Ref Range Status   06/26/2021 99 70 - 110 mg/dL Final     BUN   Date Value Ref Range Status   06/26/2021 15 6 - 20 mg/dL Final     Creatinine   Date Value Ref Range Status   06/26/2021 0.8 0.5 - 1.4 mg/dL Final     Calcium   Date Value Ref Range Status   06/26/2021 9.7 8.7 - 10.5 mg/dL Final     Total Protein   Date Value Ref Range Status   06/26/2021 8.3 6.0 - 8.4 g/dL Final     Albumin   Date Value Ref Range Status   06/26/2021  "4.4 3.5 - 5.2 g/dL Final     Total Bilirubin   Date Value Ref Range Status   06/26/2021 0.5 0.1 - 1.0 mg/dL Final     Comment:     For infants and newborns, interpretation of results should be based  on gestational age, weight and in agreement with clinical  observations.    Premature Infant recommended reference ranges:  Up to 24 hours.............<8.0 mg/dL  Up to 48 hours............<12.0 mg/dL  3-5 days..................<15.0 mg/dL  6-29 days.................<15.0 mg/dL       Alkaline Phosphatase   Date Value Ref Range Status   06/26/2021 69 55 - 135 U/L Final     AST   Date Value Ref Range Status   06/26/2021 21 10 - 40 U/L Final     ALT   Date Value Ref Range Status   06/26/2021 30 10 - 44 U/L Final     Anion Gap   Date Value Ref Range Status   06/26/2021 11 8 - 16 mmol/L Final     eGFR if    Date Value Ref Range Status   06/26/2021 >60.0 >60 mL/min/1.73 m^2 Final     eGFR if non    Date Value Ref Range Status   06/26/2021 >60.0 >60 mL/min/1.73 m^2 Final     Comment:     Calculation used to obtain the estimated glomerular filtration  rate (eGFR) is the CKD-EPI equation.           Lab Results   Component Value Date    HGBA1C 4.9 12/05/2020        Objective:     Vitals:    05/26/22 1031   BP: 114/60   Pulse: 89   Temp: 99.2 °F (37.3 °C)   TempSrc: Oral   SpO2: 99%   Weight: 68.2 kg (150 lb 5.7 oz)   Height: 5' 2" (1.575 m)   PainSc: 0-No pain     Body mass index is 27.5 kg/m².    Physical Exam  Vitals and nursing note reviewed.   Constitutional:       Appearance: Normal appearance. He is well-developed.   HENT:      Head: Normocephalic and atraumatic.      Right Ear: Hearing, tympanic membrane, ear canal and external ear normal. No decreased hearing noted. No drainage or swelling.      Left Ear: Hearing, tympanic membrane, ear canal and external ear normal. No decreased hearing noted. No drainage or swelling.      Nose: Nose normal. No rhinorrhea.      Mouth/Throat:      Pharynx: " No oropharyngeal exudate or posterior oropharyngeal erythema.   Eyes:      General: Lids are normal.         Right eye: No discharge.         Left eye: No discharge.      Conjunctiva/sclera: Conjunctivae normal.      Right eye: Right conjunctiva is not injected. No exudate.     Left eye: Left conjunctiva is not injected. No exudate.     Pupils: Pupils are equal, round, and reactive to light.   Neck:      Thyroid: No thyroid mass or thyromegaly.      Vascular: Normal carotid pulses. No carotid bruit, hepatojugular reflux or JVD.      Trachea: Trachea normal.   Cardiovascular:      Rate and Rhythm: Normal rate and regular rhythm.      Heart sounds: Normal heart sounds.   Pulmonary:      Effort: Pulmonary effort is normal. No respiratory distress.   Abdominal:      General: Bowel sounds are normal.      Palpations: Abdomen is soft.      Tenderness: There is no abdominal tenderness. Negative signs include Escobedo's sign.   Musculoskeletal:      Cervical back: Full passive range of motion without pain. No edema, erythema or rigidity.   Lymphadenopathy:      Cervical: No cervical adenopathy.   Skin:     General: Skin is warm and dry.   Neurological:      Mental Status: He is alert.   Psychiatric:         Speech: Speech normal.         Behavior: Behavior normal.         Assessment and Plan:   Phoenix was seen today for chest pain.    Diagnoses and all orders for this visit:    Dyspepsia    Decreased strength of trunk and back    Posture imbalance    KASH (obstructive sleep apnea)    Fatigue, unspecified type    Other orders  -     diclofenac (VOLTAREN) 75 MG EC tablet; Take 1 tablet (75 mg total) by mouth 2 (two) times daily. For pain and inflammation for 5-10 days. Take w food and water      Discussed physical therapy and poss rheum referrals. Declined.    Anti-inflammatory. Can consider rheum labs. Declined.    Fu pcp for annual    Time spent in the evaluation and management of this patient exceeded 45min and greater than 50%  of this time was in face-to-face education regarding diagnoses, medications, plan, and follow-up.    No follow-ups on file.    THIS NOTE WILL BE SHARED WITH THE PATIENT.

## 2022-06-03 PROBLEM — K21.9 GASTROESOPHAGEAL REFLUX DISEASE: Status: RESOLVED | Noted: 2021-03-11 | Resolved: 2022-06-03

## 2022-10-21 ENCOUNTER — OFFICE VISIT (OUTPATIENT)
Dept: INTERNAL MEDICINE | Facility: CLINIC | Age: 32
End: 2022-10-21
Payer: COMMERCIAL

## 2022-10-21 VITALS
TEMPERATURE: 99 F | DIASTOLIC BLOOD PRESSURE: 70 MMHG | HEART RATE: 88 BPM | OXYGEN SATURATION: 97 % | SYSTOLIC BLOOD PRESSURE: 120 MMHG | HEIGHT: 62 IN | BODY MASS INDEX: 27.55 KG/M2 | WEIGHT: 149.69 LBS

## 2022-10-21 DIAGNOSIS — Z00.00 WELL ADULT EXAM: Primary | ICD-10-CM

## 2022-10-21 DIAGNOSIS — E55.9 VITAMIN D INSUFFICIENCY: ICD-10-CM

## 2022-10-21 DIAGNOSIS — R30.0 DYSURIA: ICD-10-CM

## 2022-10-21 DIAGNOSIS — G47.33 OSA (OBSTRUCTIVE SLEEP APNEA): ICD-10-CM

## 2022-10-21 PROCEDURE — 99395 PR PREVENTIVE VISIT,EST,18-39: ICD-10-PCS | Mod: S$GLB,,, | Performed by: FAMILY MEDICINE

## 2022-10-21 PROCEDURE — 99999 PR PBB SHADOW E&M-EST. PATIENT-LVL IV: ICD-10-PCS | Mod: PBBFAC,,, | Performed by: FAMILY MEDICINE

## 2022-10-21 PROCEDURE — 99395 PREV VISIT EST AGE 18-39: CPT | Mod: S$GLB,,, | Performed by: FAMILY MEDICINE

## 2022-10-21 PROCEDURE — 99999 PR PBB SHADOW E&M-EST. PATIENT-LVL IV: CPT | Mod: PBBFAC,,, | Performed by: FAMILY MEDICINE

## 2022-10-21 NOTE — PROGRESS NOTES
Subjective:       Patient ID: Phoenix Jones is a 32 y.o. male.    Chief Complaint: Headache and Discuss Rx  32-year-old male presents to clinic today accompanied by his wife for annual physical exam.  He was last seen in 2020 for annual exam and has had multiple no-shows ever since.  He has had multiple pain complaints in the past and seen multiple specialists for follow-up but is extremely inconsistent with follow-up.  At this time he reports that he has seen a physical therapist who has assisted with stretching and a lot of his pain complaints have resolved.  He was started on pantoprazole secondary to GERD and reports that he is no longer taking the medication but symptoms have improved.  At this time he does note some mild urinary discomfort.  He has been evaluated for sleep apnea in the past but has not followed up with further evaluation.  He has a past surgical history of EGD.  He reports a family history of thyroid disease in his daughter.  Headache   Pertinent negatives include no abdominal pain, back pain, coughing, dizziness, ear pain, eye redness, fever, hearing loss, nausea, neck pain, rhinorrhea, sinus pressure, sore throat, tinnitus or vomiting.   Review of Systems   Constitutional:  Negative for appetite change, chills, fatigue and fever.   HENT:  Negative for nasal congestion, ear pain, hearing loss, postnasal drip, rhinorrhea, sinus pressure/congestion, sore throat and tinnitus.    Eyes:  Negative for redness, itching and visual disturbance.   Respiratory:  Negative for cough, chest tightness and shortness of breath.    Cardiovascular:  Negative for chest pain and palpitations.   Gastrointestinal:  Negative for abdominal pain, constipation, diarrhea, nausea and vomiting.   Genitourinary:  Positive for dysuria. Negative for decreased urine volume, difficulty urinating, frequency, hematuria and urgency.   Musculoskeletal:  Negative for back pain, myalgias, neck pain and neck stiffness.   Integumentary:   Negative for rash.   Neurological:  Positive for headaches. Negative for dizziness and light-headedness.   Psychiatric/Behavioral: Negative.         Objective:      Physical Exam  Vitals and nursing note reviewed. Exam conducted with a chaperone present.   Constitutional:       General: He is not in acute distress.     Appearance: Normal appearance. He is well-developed. He is not diaphoretic.   HENT:      Head: Normocephalic and atraumatic.      Right Ear: External ear normal.      Left Ear: External ear normal.      Nose: Nose normal.      Mouth/Throat:      Pharynx: No oropharyngeal exudate.   Eyes:      General: No scleral icterus.        Right eye: No discharge.         Left eye: No discharge.      Conjunctiva/sclera: Conjunctivae normal.      Pupils: Pupils are equal, round, and reactive to light.   Neck:      Thyroid: No thyromegaly.      Vascular: No JVD.      Trachea: No tracheal deviation.   Cardiovascular:      Rate and Rhythm: Normal rate and regular rhythm.      Heart sounds: Normal heart sounds. No murmur heard.    No friction rub. No gallop.   Pulmonary:      Effort: Pulmonary effort is normal. No respiratory distress.      Breath sounds: Normal breath sounds. No stridor. No wheezing, rhonchi or rales.   Chest:      Chest wall: No tenderness.   Abdominal:      General: Bowel sounds are normal. There is no distension.      Palpations: Abdomen is soft. There is no mass.      Tenderness: There is no abdominal tenderness. There is no guarding or rebound.   Genitourinary:     Penis: Normal and uncircumcised.       Testes: Normal.      Epididymis:      Right: Normal.      Left: Normal.   Musculoskeletal:         General: No tenderness. Normal range of motion.      Cervical back: Normal range of motion and neck supple.   Lymphadenopathy:      Cervical: No cervical adenopathy.   Skin:     General: Skin is warm and dry.      Coloration: Skin is not pale.      Findings: No erythema or rash.   Neurological:       Mental Status: He is alert and oriented to person, place, and time.   Psychiatric:         Mood and Affect: Mood normal.         Behavior: Behavior normal.         Thought Content: Thought content normal.         Judgment: Judgment normal.       Assessment:       Problem List Items Addressed This Visit       KASH (obstructive sleep apnea)    Vitamin D insufficiency    Relevant Orders    Vitamin D     Other Visit Diagnoses       Well adult exam    -  Primary    Relevant Orders    CBC Auto Differential    Comprehensive Metabolic Panel    Lipid Panel    T4, Free    TSH    Vitamin D    Hemoglobin A1C    Urinalysis    Dysuria                Plan:         1. CBC, CMP, UA, TSH, free T4, fasting lipids, vitamin-D level, and hemoglobin A1c.  2. Encourage hydration.  3. Continue over-the-counter vitamin-D 2000 units daily.    4. Encourage follow-up with Sleep Medicine for further evaluation of sleep apnea.    5. Return to clinic as needed or in 1 year for annual physical exam.

## 2022-10-24 ENCOUNTER — LAB VISIT (OUTPATIENT)
Dept: LAB | Facility: HOSPITAL | Age: 32
End: 2022-10-24
Attending: FAMILY MEDICINE
Payer: COMMERCIAL

## 2022-10-24 DIAGNOSIS — Z00.00 WELL ADULT EXAM: ICD-10-CM

## 2022-10-24 DIAGNOSIS — E55.9 VITAMIN D INSUFFICIENCY: ICD-10-CM

## 2022-10-24 LAB
25(OH)D3+25(OH)D2 SERPL-MCNC: 12 NG/ML (ref 30–96)
ALBUMIN SERPL BCP-MCNC: 4.5 G/DL (ref 3.5–5.2)
ALP SERPL-CCNC: 53 U/L (ref 55–135)
ALT SERPL W/O P-5'-P-CCNC: 18 U/L (ref 10–44)
ANION GAP SERPL CALC-SCNC: 11 MMOL/L (ref 8–16)
AST SERPL-CCNC: 16 U/L (ref 10–40)
BASOPHILS # BLD AUTO: 0.02 K/UL (ref 0–0.2)
BASOPHILS NFR BLD: 0.3 % (ref 0–1.9)
BILIRUB SERPL-MCNC: 0.6 MG/DL (ref 0.1–1)
BUN SERPL-MCNC: 16 MG/DL (ref 6–20)
CALCIUM SERPL-MCNC: 9.4 MG/DL (ref 8.7–10.5)
CHLORIDE SERPL-SCNC: 103 MMOL/L (ref 95–110)
CHOLEST SERPL-MCNC: 204 MG/DL (ref 120–199)
CHOLEST/HDLC SERPL: 4.4 {RATIO} (ref 2–5)
CO2 SERPL-SCNC: 26 MMOL/L (ref 23–29)
CREAT SERPL-MCNC: 0.7 MG/DL (ref 0.5–1.4)
DIFFERENTIAL METHOD: NORMAL
EOSINOPHIL # BLD AUTO: 0.1 K/UL (ref 0–0.5)
EOSINOPHIL NFR BLD: 2.2 % (ref 0–8)
ERYTHROCYTE [DISTWIDTH] IN BLOOD BY AUTOMATED COUNT: 13 % (ref 11.5–14.5)
EST. GFR  (NO RACE VARIABLE): >60 ML/MIN/1.73 M^2
ESTIMATED AVG GLUCOSE: 94 MG/DL (ref 68–131)
GLUCOSE SERPL-MCNC: 74 MG/DL (ref 70–110)
HBA1C MFR BLD: 4.9 % (ref 4–5.6)
HCT VFR BLD AUTO: 43.4 % (ref 40–54)
HDLC SERPL-MCNC: 46 MG/DL (ref 40–75)
HDLC SERPL: 22.5 % (ref 20–50)
HGB BLD-MCNC: 14 G/DL (ref 14–18)
IMM GRANULOCYTES # BLD AUTO: 0.01 K/UL (ref 0–0.04)
IMM GRANULOCYTES NFR BLD AUTO: 0.2 % (ref 0–0.5)
LDLC SERPL CALC-MCNC: 122 MG/DL (ref 63–159)
LYMPHOCYTES # BLD AUTO: 2.1 K/UL (ref 1–4.8)
LYMPHOCYTES NFR BLD: 35 % (ref 18–48)
MCH RBC QN AUTO: 29.7 PG (ref 27–31)
MCHC RBC AUTO-ENTMCNC: 32.3 G/DL (ref 32–36)
MCV RBC AUTO: 92 FL (ref 82–98)
MONOCYTES # BLD AUTO: 0.4 K/UL (ref 0.3–1)
MONOCYTES NFR BLD: 6.5 % (ref 4–15)
NEUTROPHILS # BLD AUTO: 3.4 K/UL (ref 1.8–7.7)
NEUTROPHILS NFR BLD: 55.8 % (ref 38–73)
NONHDLC SERPL-MCNC: 158 MG/DL
NRBC BLD-RTO: 0 /100 WBC
PLATELET # BLD AUTO: 244 K/UL (ref 150–450)
PMV BLD AUTO: 10.2 FL (ref 9.2–12.9)
POTASSIUM SERPL-SCNC: 3.6 MMOL/L (ref 3.5–5.1)
PROT SERPL-MCNC: 7.7 G/DL (ref 6–8.4)
RBC # BLD AUTO: 4.71 M/UL (ref 4.6–6.2)
SODIUM SERPL-SCNC: 140 MMOL/L (ref 136–145)
T4 FREE SERPL-MCNC: 0.87 NG/DL (ref 0.71–1.51)
TRIGL SERPL-MCNC: 180 MG/DL (ref 30–150)
TSH SERPL DL<=0.005 MIU/L-ACNC: 1.58 UIU/ML (ref 0.4–4)
WBC # BLD AUTO: 6.03 K/UL (ref 3.9–12.7)

## 2022-10-24 PROCEDURE — 83036 HEMOGLOBIN GLYCOSYLATED A1C: CPT | Performed by: FAMILY MEDICINE

## 2022-10-24 PROCEDURE — 82306 VITAMIN D 25 HYDROXY: CPT | Performed by: FAMILY MEDICINE

## 2022-10-24 PROCEDURE — 36415 COLL VENOUS BLD VENIPUNCTURE: CPT | Mod: PO | Performed by: FAMILY MEDICINE

## 2022-10-24 PROCEDURE — 84443 ASSAY THYROID STIM HORMONE: CPT | Performed by: FAMILY MEDICINE

## 2022-10-24 PROCEDURE — 85025 COMPLETE CBC W/AUTO DIFF WBC: CPT | Performed by: FAMILY MEDICINE

## 2022-10-24 PROCEDURE — 80061 LIPID PANEL: CPT | Performed by: FAMILY MEDICINE

## 2022-10-24 PROCEDURE — 80053 COMPREHEN METABOLIC PANEL: CPT | Performed by: FAMILY MEDICINE

## 2022-10-24 PROCEDURE — 84439 ASSAY OF FREE THYROXINE: CPT | Performed by: FAMILY MEDICINE

## 2022-10-25 ENCOUNTER — PATIENT MESSAGE (OUTPATIENT)
Dept: INTERNAL MEDICINE | Facility: CLINIC | Age: 32
End: 2022-10-25
Payer: COMMERCIAL

## 2022-10-25 ENCOUNTER — TELEPHONE (OUTPATIENT)
Dept: INTERNAL MEDICINE | Facility: CLINIC | Age: 32
End: 2022-10-25
Payer: COMMERCIAL

## 2022-10-25 DIAGNOSIS — E55.9 VITAMIN D INSUFFICIENCY: Primary | ICD-10-CM

## 2022-10-25 RX ORDER — ERGOCALCIFEROL 1.25 MG/1
50000 CAPSULE ORAL
Qty: 12 CAPSULE | Refills: 0 | Status: SHIPPED | OUTPATIENT
Start: 2022-10-25 | End: 2024-04-02 | Stop reason: SDUPTHER

## 2023-02-16 ENCOUNTER — TELEPHONE (OUTPATIENT)
Dept: INTERNAL MEDICINE | Facility: CLINIC | Age: 33
End: 2023-02-16
Payer: COMMERCIAL

## 2023-02-16 NOTE — TELEPHONE ENCOUNTER
Called pt back Dr. Bowens is not accepting new pts. Told her Dr. Branch may be accepting new pts.

## 2023-02-16 NOTE — TELEPHONE ENCOUNTER
----- Message from Arpan Bowens MD sent at 2/16/2023  4:10 PM CST -----  Contact: [phone] Deidre(sister)  Not accepting new patients.   ----- Message -----  From: Collette Hameed MA  Sent: 2/16/2023   3:04 PM CST  To: Arpan Bowens MD      ----- Message -----  From: Maria Guadalupe tSreet  Sent: 2/16/2023   2:54 PM CST  To: Kwan BALES Staff    Pt sister is requesting a new patient appt with Dr Bowens. Pt sister states she feels the pt is not getting the correct care due to the language barrier. Pt sister was advised Dr Bowens is not accepting new pts. Please call and advise.

## 2023-08-16 ENCOUNTER — TELEPHONE (OUTPATIENT)
Dept: INTERNAL MEDICINE | Facility: CLINIC | Age: 33
End: 2023-08-16
Payer: COMMERCIAL

## 2023-08-16 NOTE — TELEPHONE ENCOUNTER
----- Message from Collette Hameed MA sent at 8/15/2023  2:06 PM CDT -----  Regarding: FW: New Patients  Hey is Dr. Branch still willing to take him on as a patient? I know we talked about it months back.   ----- Message -----  From: Arpan Bowens MD  Sent: 8/15/2023   1:48 PM CDT  To: Collette Hameed MA  Subject: RE: New Patients                                 No. We had this conversation before.  He was supposed to switch to Dr. Branch  ----- Message -----  From: Collette Hameed MA  Sent: 8/15/2023   1:46 PM CDT  To: Arpan Bowens MD  Subject: FW: New Patients                                 Would you take him on as a pt?  If so in what slots are you okay with me scheduling him?  ----- Message -----  From: Jaky Barber  Sent: 8/15/2023   1:28 PM CDT  To: Kwan Brower  Subject: New Patients                                     Pt stated that he would like to est care with this provider. Pt stated that his uncle was just seen and was accepted as a new patient.     Please assist

## 2023-08-16 NOTE — TELEPHONE ENCOUNTER
Dr. Bowens I just wanted to let you know I set up an new patient for this patient with Dr. Branch on Aug 22 and I called him to let him know, but he canceled the appointment with Dr. Branch and made the appointment with you as an est. Patient.

## 2023-08-22 PROBLEM — K29.30 CHRONIC SUPERFICIAL GASTRITIS WITHOUT BLEEDING: Status: ACTIVE | Noted: 2023-08-22

## 2023-08-22 PROBLEM — K76.0 HEPATIC STEATOSIS: Status: ACTIVE | Noted: 2023-08-22

## 2024-04-02 ENCOUNTER — LAB VISIT (OUTPATIENT)
Dept: LAB | Facility: HOSPITAL | Age: 34
End: 2024-04-02
Attending: INTERNAL MEDICINE
Payer: COMMERCIAL

## 2024-04-02 ENCOUNTER — OFFICE VISIT (OUTPATIENT)
Dept: INTERNAL MEDICINE | Facility: CLINIC | Age: 34
End: 2024-04-02
Payer: COMMERCIAL

## 2024-04-02 VITALS
WEIGHT: 156.06 LBS | HEIGHT: 62 IN | OXYGEN SATURATION: 98 % | SYSTOLIC BLOOD PRESSURE: 118 MMHG | BODY MASS INDEX: 28.72 KG/M2 | DIASTOLIC BLOOD PRESSURE: 78 MMHG | HEART RATE: 90 BPM

## 2024-04-02 DIAGNOSIS — K29.30 CHRONIC SUPERFICIAL GASTRITIS WITHOUT BLEEDING: ICD-10-CM

## 2024-04-02 DIAGNOSIS — J30.1 CHRONIC ALLERGIC RHINITIS DUE TO POLLEN: ICD-10-CM

## 2024-04-02 DIAGNOSIS — K76.0 HEPATIC STEATOSIS: ICD-10-CM

## 2024-04-02 DIAGNOSIS — G47.33 OSA (OBSTRUCTIVE SLEEP APNEA): ICD-10-CM

## 2024-04-02 DIAGNOSIS — E55.9 VITAMIN D DEFICIENCY: ICD-10-CM

## 2024-04-02 DIAGNOSIS — Z00.00 ANNUAL PHYSICAL EXAM: ICD-10-CM

## 2024-04-02 DIAGNOSIS — Z00.00 ANNUAL PHYSICAL EXAM: Primary | ICD-10-CM

## 2024-04-02 DIAGNOSIS — Z23 NEED FOR TETANUS, DIPHTHERIA, AND ACELLULAR PERTUSSIS (TDAP) VACCINE: ICD-10-CM

## 2024-04-02 DIAGNOSIS — Q23.1 BICUSPID AORTIC VALVE: ICD-10-CM

## 2024-04-02 DIAGNOSIS — Z23 NEED FOR VACCINATION: ICD-10-CM

## 2024-04-02 DIAGNOSIS — Z23 NEED FOR PNEUMOCOCCAL 20-VALENT CONJUGATE VACCINATION: ICD-10-CM

## 2024-04-02 PROBLEM — Q23.81 BICUSPID AORTIC VALVE: Status: ACTIVE | Noted: 2024-04-02

## 2024-04-02 LAB
25(OH)D3+25(OH)D2 SERPL-MCNC: 15 NG/ML (ref 30–96)
ALBUMIN SERPL BCP-MCNC: 4.3 G/DL (ref 3.5–5.2)
ALP SERPL-CCNC: 57 U/L (ref 55–135)
ALT SERPL W/O P-5'-P-CCNC: 30 U/L (ref 10–44)
ANION GAP SERPL CALC-SCNC: 10 MMOL/L (ref 8–16)
AST SERPL-CCNC: 18 U/L (ref 10–40)
BASOPHILS # BLD AUTO: 0.03 K/UL (ref 0–0.2)
BASOPHILS NFR BLD: 0.4 % (ref 0–1.9)
BILIRUB SERPL-MCNC: 0.7 MG/DL (ref 0.1–1)
BUN SERPL-MCNC: 18 MG/DL (ref 6–20)
CALCIUM SERPL-MCNC: 9.6 MG/DL (ref 8.7–10.5)
CHLORIDE SERPL-SCNC: 105 MMOL/L (ref 95–110)
CHOLEST SERPL-MCNC: 206 MG/DL (ref 120–199)
CHOLEST/HDLC SERPL: 5 {RATIO} (ref 2–5)
CO2 SERPL-SCNC: 25 MMOL/L (ref 23–29)
CREAT SERPL-MCNC: 0.9 MG/DL (ref 0.5–1.4)
DIFFERENTIAL METHOD BLD: NORMAL
EOSINOPHIL # BLD AUTO: 0.4 K/UL (ref 0–0.5)
EOSINOPHIL NFR BLD: 5.2 % (ref 0–8)
ERYTHROCYTE [DISTWIDTH] IN BLOOD BY AUTOMATED COUNT: 12.9 % (ref 11.5–14.5)
EST. GFR  (NO RACE VARIABLE): >60 ML/MIN/1.73 M^2
ESTIMATED AVG GLUCOSE: 97 MG/DL (ref 68–131)
GLUCOSE SERPL-MCNC: 84 MG/DL (ref 70–110)
HBA1C MFR BLD: 5 % (ref 4–5.6)
HCT VFR BLD AUTO: 43.1 % (ref 40–54)
HCV AB SERPL QL IA: NORMAL
HDLC SERPL-MCNC: 41 MG/DL (ref 40–75)
HDLC SERPL: 19.9 % (ref 20–50)
HGB BLD-MCNC: 14.2 G/DL (ref 14–18)
HIV 1+2 AB+HIV1 P24 AG SERPL QL IA: NORMAL
IMM GRANULOCYTES # BLD AUTO: 0.01 K/UL (ref 0–0.04)
IMM GRANULOCYTES NFR BLD AUTO: 0.1 % (ref 0–0.5)
LDLC SERPL CALC-MCNC: 132.8 MG/DL (ref 63–159)
LYMPHOCYTES # BLD AUTO: 2.1 K/UL (ref 1–4.8)
LYMPHOCYTES NFR BLD: 30.7 % (ref 18–48)
MCH RBC QN AUTO: 29.7 PG (ref 27–31)
MCHC RBC AUTO-ENTMCNC: 32.9 G/DL (ref 32–36)
MCV RBC AUTO: 90 FL (ref 82–98)
MONOCYTES # BLD AUTO: 0.4 K/UL (ref 0.3–1)
MONOCYTES NFR BLD: 5.7 % (ref 4–15)
NEUTROPHILS # BLD AUTO: 3.9 K/UL (ref 1.8–7.7)
NEUTROPHILS NFR BLD: 57.9 % (ref 38–73)
NONHDLC SERPL-MCNC: 165 MG/DL
NRBC BLD-RTO: 0 /100 WBC
PLATELET # BLD AUTO: 270 K/UL (ref 150–450)
PMV BLD AUTO: 10 FL (ref 9.2–12.9)
POTASSIUM SERPL-SCNC: 3.9 MMOL/L (ref 3.5–5.1)
PROT SERPL-MCNC: 7.8 G/DL (ref 6–8.4)
RBC # BLD AUTO: 4.78 M/UL (ref 4.6–6.2)
SODIUM SERPL-SCNC: 140 MMOL/L (ref 136–145)
TRIGL SERPL-MCNC: 161 MG/DL (ref 30–150)
TSH SERPL DL<=0.005 MIU/L-ACNC: 1.06 UIU/ML (ref 0.4–4)
VIT B12 SERPL-MCNC: 829 PG/ML (ref 210–950)
WBC # BLD AUTO: 6.67 K/UL (ref 3.9–12.7)

## 2024-04-02 PROCEDURE — 86803 HEPATITIS C AB TEST: CPT | Performed by: INTERNAL MEDICINE

## 2024-04-02 PROCEDURE — 80061 LIPID PANEL: CPT | Performed by: INTERNAL MEDICINE

## 2024-04-02 PROCEDURE — 3074F SYST BP LT 130 MM HG: CPT | Mod: CPTII,S$GLB,, | Performed by: INTERNAL MEDICINE

## 2024-04-02 PROCEDURE — 87389 HIV-1 AG W/HIV-1&-2 AB AG IA: CPT | Performed by: INTERNAL MEDICINE

## 2024-04-02 PROCEDURE — 82607 VITAMIN B-12: CPT | Performed by: INTERNAL MEDICINE

## 2024-04-02 PROCEDURE — 36415 COLL VENOUS BLD VENIPUNCTURE: CPT | Performed by: INTERNAL MEDICINE

## 2024-04-02 PROCEDURE — 99999 PR PBB SHADOW E&M-EST. PATIENT-LVL IV: CPT | Mod: PBBFAC,,, | Performed by: INTERNAL MEDICINE

## 2024-04-02 PROCEDURE — 83036 HEMOGLOBIN GLYCOSYLATED A1C: CPT | Performed by: INTERNAL MEDICINE

## 2024-04-02 PROCEDURE — 3078F DIAST BP <80 MM HG: CPT | Mod: CPTII,S$GLB,, | Performed by: INTERNAL MEDICINE

## 2024-04-02 PROCEDURE — 84443 ASSAY THYROID STIM HORMONE: CPT | Performed by: INTERNAL MEDICINE

## 2024-04-02 PROCEDURE — 90686 IIV4 VACC NO PRSV 0.5 ML IM: CPT | Mod: S$GLB,,, | Performed by: INTERNAL MEDICINE

## 2024-04-02 PROCEDURE — 90472 IMMUNIZATION ADMIN EACH ADD: CPT | Mod: S$GLB,,, | Performed by: INTERNAL MEDICINE

## 2024-04-02 PROCEDURE — 82306 VITAMIN D 25 HYDROXY: CPT | Performed by: INTERNAL MEDICINE

## 2024-04-02 PROCEDURE — 1159F MED LIST DOCD IN RCRD: CPT | Mod: CPTII,S$GLB,, | Performed by: INTERNAL MEDICINE

## 2024-04-02 PROCEDURE — 85025 COMPLETE CBC W/AUTO DIFF WBC: CPT | Performed by: INTERNAL MEDICINE

## 2024-04-02 PROCEDURE — 90471 IMMUNIZATION ADMIN: CPT | Mod: S$GLB,,, | Performed by: INTERNAL MEDICINE

## 2024-04-02 PROCEDURE — 99214 OFFICE O/P EST MOD 30 MIN: CPT | Mod: 25,S$GLB,, | Performed by: INTERNAL MEDICINE

## 2024-04-02 PROCEDURE — 90715 TDAP VACCINE 7 YRS/> IM: CPT | Mod: S$GLB,,, | Performed by: INTERNAL MEDICINE

## 2024-04-02 PROCEDURE — 3008F BODY MASS INDEX DOCD: CPT | Mod: CPTII,S$GLB,, | Performed by: INTERNAL MEDICINE

## 2024-04-02 PROCEDURE — 80053 COMPREHEN METABOLIC PANEL: CPT | Performed by: INTERNAL MEDICINE

## 2024-04-02 RX ORDER — ERGOCALCIFEROL 1.25 MG/1
50000 CAPSULE ORAL
Qty: 12 CAPSULE | Refills: 3 | Status: SHIPPED | OUTPATIENT
Start: 2024-04-02 | End: 2024-04-07 | Stop reason: SDUPTHER

## 2024-04-02 RX ORDER — LEVOCETIRIZINE DIHYDROCHLORIDE 5 MG/1
5 TABLET, FILM COATED ORAL DAILY PRN
Qty: 90 TABLET | Refills: 3 | Status: SHIPPED | OUTPATIENT
Start: 2024-04-02 | End: 2024-04-07 | Stop reason: SDUPTHER

## 2024-04-02 RX ORDER — ERGOCALCIFEROL 1.25 MG/1
50000 CAPSULE ORAL
Qty: 12 CAPSULE | Refills: 3 | Status: SHIPPED | OUTPATIENT
Start: 2024-04-02 | End: 2024-04-02 | Stop reason: SDUPTHER

## 2024-04-02 RX ORDER — NAPROXEN SODIUM 220 MG/1
2 TABLET ORAL DAILY
COMMUNITY
Start: 2024-04-02

## 2024-04-02 RX ORDER — VITAMIN E 268 MG
400 CAPSULE ORAL DAILY
COMMUNITY
Start: 2024-04-02

## 2024-04-02 RX ORDER — FLUTICASONE PROPIONATE 50 MCG
1 SPRAY, SUSPENSION (ML) NASAL DAILY
Qty: 16 G | Refills: 11 | Status: SHIPPED | OUTPATIENT
Start: 2024-04-02 | End: 2024-04-07 | Stop reason: SDUPTHER

## 2024-04-02 NOTE — PROGRESS NOTES
INTERNAL MEDICINE CLINIC    Initial Visit to Establish Care    PRESENTING HISTORY     Previous PCP:  Dr. Benton    Chief Complaint/Reason for Visit:  Establish care.    History of Present Illness & ROS : Mr. Phoenix Jones is a 33 y.o. male.      Review of Systems   Constitutional:  Negative for chills and fever.   HENT:  Positive for congestion.    Respiratory:  Negative for cough and shortness of breath.    Cardiovascular:  Negative for chest pain.   Gastrointestinal:  Negative for abdominal pain and heartburn.   Skin:  Negative for rash.   Neurological:  Positive for headaches (bitemporal).   Psychiatric/Behavioral:  Negative for depression.        PAST HISTORY:     Past Medical History:   Diagnosis Date    Bicuspid aortic valve 04/02/2024 2-2021  The left ventricle is normal in size with normal systolic function. The estimated ejection fraction is 60%  Normal right ventricular size with normal right ventricular systolic function.  Normal left ventricular diastolic function.  Bicuspid aortic valve with normal leaflet mobility and mild aortic regurgitation.  The estimated PA systolic pressure is 39 mmHg.  Normal central venous pressu    Chronic allergic rhinitis due to pollen 04/02/2024    Chronic superficial gastritis without bleeding 08/22/2023    EGD 5-  - Normal esophagus.    - Gastritis. Biopsied.   - Duodenal erosions without bleeding. Biopsied.   The biopsies show some chronic inflammation of the stomach, which is likely from the Hpylori that was treated IN THE PAST. There is NO evidence of ongoing Hpylori infection (no ACTIVE infection, this is both by biopsy as well as the negative stool test months ago)      Hepatic steatosis 08/22/2023    3-2021  - mild hepatic steatosis    History of Helicobacter pylori infection     Treated    KASH (obstructive sleep apnea) 03/11/2021    Vitamin D deficiency 12/06/2020       Past Surgical History:   Procedure Laterality Date    ESOPHAGOGASTRODUODENOSCOPY   05/12/2021    ESOPHAGOGASTRODUODENOSCOPY N/A 5/12/2021    Procedure: EGD (ESOPHAGOGASTRODUODENOSCOPY) Covid test 5/9/21;  Surgeon: Emerson Fontaine MD;  Location: [facility];  Service: Endoscopy;  Laterality: N/A;  with G / D biopsies       Family History   Problem Relation Age of Onset    No Known Problems Mother     No Known Problems Father     No Known Problems Sister     Thyroid disease Daughter        Social History     Socioeconomic History    Marital status:    Tobacco Use    Smoking status: Former    Smokeless tobacco: Never   Substance and Sexual Activity    Alcohol use: No    Drug use: No    Sexual activity: Yes     Partners: Female   Social History Narrative    Self business.        From [facility].        : Scott        1 boy 1 girl.     Social Determinants of Health     Financial Resource Strain: Low Risk  (5/4/2020)    Overall Financial Resource Strain (CARDIA)     Difficulty of Paying Living Expenses: Not hard at all   Food Insecurity: No Food Insecurity (5/4/2020)    Hunger Vital Sign     Worried About Running Out of Food in the Last Year: Never true     Ran Out of Food in the Last Year: Never true   Transportation Needs: No Transportation Needs (5/4/2020)    PRAPARE - Transportation     Lack of Transportation (Medical): No     Lack of Transportation (Non-Medical): No   Physical Activity: Insufficiently Active (5/4/2020)    Exercise Vital Sign     Days of Exercise per Week: 1 day     Minutes of Exercise per Session: 10 min   Stress: No Stress Concern Present (5/4/2020)    [employer] [place] [employer] - Occupational Stress Questionnaire     Feeling of Stress : Not at all   Social Connections: Unknown (5/4/2020)    Social Connection and Isolation Panel [NHANES]     Frequency of Communication with Friends and Family: More than three times a week     Frequency of Social Gatherings with Friends and Family: Three times a week     Active Member of Clubs or Organizations: No      Attends Club or Organization Meetings: Never     Marital Status:        MEDICATIONS & ALLERGIES:     Current Outpatient Medications on File Prior to Visit   Medication Sig Dispense Refill    ergocalciferol (ERGOCALCIFEROL) 50,000 unit Cap Take 1 capsule (50,000 Units total) by mouth every 7 days. (Patient not taking: Reported on 4/2/2024) 12 capsule 0        Review of patient's allergies indicates:  No Known Allergies    Medications Reconciliation:   I have reconciled the patient's home medications with the patient/family. I have updated all changes.  Refer to After-Visit Medication List.    OBJECTIVE:     Vital Signs:  Vitals:    04/02/24 0847   BP: 118/78   Pulse: 90     Wt Readings from Last 3 Encounters:   04/02/24 0847 70.8 kg (156 lb 1.4 oz)   10/21/22 1528 67.9 kg (149 lb 11.1 oz)   05/26/22 1031 68.2 kg (150 lb 5.7 oz)     Body mass index is 28.55 kg/m².     Physical Exam:  General: Well developed, well nourished. No distress.  HEENT: Head is normocephalic, atraumatic; ears are normal.    Increase nasal polyp swelling.  Eyes: Clear conjunctiva.  Neck: Supple, symmetrical neck; trachea midline.  Lungs: Clear to auscultation bilaterally and normal respiratory effort.  Cardiovascular: Heart with regular rate and rhythm.    Extremities: No LE edema.    Abdomen: Abdomen is soft, non-tender non-distended with normal bowel sounds.  Musculoskeletal: Normal gait.   Genital:  Normal penis. Foreskin retractable.   No rash in the genital area.  Scrotum and epididymis normal. No inguinal hernia.  No inguinal nodes.   Rectal: No perianal lesions or rash. Digital exam: deferred.   Lymph Nodes: No cervical, supraclavicular or axillary adenopathy.  Psychiatric: Normal affect. Alert.    Laboratory  Lab Results   Component Value Date    WBC 6.03 10/24/2022    HGB 14.0 10/24/2022    HCT 43.4 10/24/2022     10/24/2022    CHOL 204 (H) 10/24/2022    TRIG 180 (H) 10/24/2022    HDL 46 10/24/2022    ALT 18 10/24/2022     AST 16 10/24/2022     10/24/2022    K 3.6 10/24/2022     10/24/2022    CREATININE 0.7 10/24/2022    BUN 16 10/24/2022    CO2 26 10/24/2022    TSH 1.580 10/24/2022    INR 1.0 02/17/2019    HGBA1C 4.9 10/24/2022       ASSESSMENT & PLAN:     Annual physical exam  - Reviewed and updated past and current medical problems.  Discussed treatment of current medical problems    -     Lipid Panel; Future; Expected date: 04/02/2024  -     CBC Auto Differential; Future; Expected date: 04/02/2024  -     Comprehensive Metabolic Panel; Future; Expected date: 04/02/2024  -     TSH; Future; Expected date: 04/02/2024  -     Hemoglobin A1C; Future; Expected date: 04/02/2024  -     Vitamin D; Future; Expected date: 09/29/2024  -     Hepatitis C Antibody; Future; Expected date: 04/02/2024  -     HIV 1/2 Ag/Ab (4th Gen); Future; Expected date: 04/02/2024  -     Vitamin B12; Future; Expected date: 04/02/2024    Hepatic steatosis   3-  - Mild Hepatic Steatosis  - Normal LFT.     Plan:  -     omega 3-dha-epa-fish oil 1,200 (144-216) mg Cap; Take 2 capsules by mouth once daily.  -     vitamin E 400 UNIT capsule; Take 1 capsule (400 Units total) by mouth once daily.    Vitamin D deficiency  -     ergocalciferol (ERGOCALCIFEROL) 50,000 unit Cap; Take 1 capsule (50,000 Units total) by mouth every 7 days.  Dispense: 12 capsule; Refill: 3    Chronic superficial gastritis without bleeding  EGD 5-   - Normal esophagus.     - Gastritis. Biopsied.    - Duodenal erosions without bleeding. Biopsied.     The biopsies show some chronic inflammation of the stomach, which is likely from the Hpylori that was treated IN THE PAST.  There is NO evidence of ongoing Hpylori infection (no ACTIVE infection, this is both by biopsy as well as the negative stool test months ago)  H pylori stool antigen 4-: Negative    - Currently asymptomatic    KASH (obstructive sleep apnea)  -     Ambulatory referral/consult to Sleep Disorders;  Future; Expected date: 04/09/2024    Chronic allergic rhinitis due to pollen  -     fluticasone propionate (FLONASE) 50 mcg/actuation nasal spray; 1 spray (50 mcg total) by Each Nostril route once daily.  Dispense: 16 g; Refill: 11  -     levocetirizine (XYZAL) 5 MG tablet; Take 1 tablet (5 mg total) by mouth daily as needed for Allergies.  Dispense: 90 tablet; Refill: 3    Bicuspid aortic valve  2-2021  The left ventricle is normal in size with normal systolic function. The estimated ejection fraction is 60%  Normal right ventricular size with normal right ventricular systolic function.  Normal left ventricular diastolic function.  Bicuspid aortic valve with normal leaflet mobility and mild aortic regurgitation.  The estimated PA systolic pressure is 39 mmHg.  Normal central venous pressure (3 mmHg).    Preventive Health Maintenance:    Need for vaccination  -     Influenza - Quadrivalent (PF)    Need for pneumococcal 20-valent conjugate vaccination  -     (In Office Administered) Pneumococcal Conjugate Vaccine (20 Valent) (IM) (Preferred)    Need for tetanus, diphtheria, and acellular pertussis (Tdap) vaccine  -     (In Office Administered) Tdap Vaccine    Return to Clinic for Follow Up with me:   1 Year.      Scheduled Follow-up :  Future Appointments   Date Time Provider Department Center   4/3/2024 10:40 AM Deepti Cohn NP [facility]   4/23/2024 10:00 AM Emerson Fontaine MD [facility]       After Visit Medication List :     Medication List            Accurate as of April 2, 2024 10:30 AM. If you have any questions, ask your nurse or doctor.                START taking these medications      fluticasone propionate 50 mcg/actuation nasal spray  Commonly known as: FLONASE  1 spray (50 mcg total) by Each Nostril route once daily.  Started by: Arpan Bowens MD     levocetirizine 5 MG tablet  Commonly known as: XYZAL  Take 1 tablet (5 mg total) by mouth daily as needed for  Allergies.  Started by: Arpan Bowens MD     omega 5-gnt-owp-fish oil 1,200 (144-216) mg Cap  Take 2 capsules by mouth once daily.  Started by: Arpan Bowens MD     pneumoc 20-geronimo conj-dip cr(PF) 0.5 mL Syrg injection  Commonly known as: PREVNAR-20 (PF)  Inject into the muscle.     vitamin E 400 UNIT capsule  Take 1 capsule (400 Units total) by mouth once daily.  Started by: Arpan Bowens MD            CONTINUE taking these medications      ergocalciferol 50,000 unit Cap  Commonly known as: ERGOCALCIFEROL  Take 1 capsule (50,000 Units total) by mouth every 7 days.            STOP taking these medications      aluminum-magnesium hydroxide-simethicone 200-200-20 mg/5 mL Susp  Commonly known as: MAALOX  Stopped by: Arpan Bowens MD     diclofenac 75 MG EC tablet  Commonly known as: VOLTAREN  Stopped by: Arpan Bowens MD     pantoprazole 40 MG tablet  Commonly known as: PROTONIX  Stopped by: Arpan Bowens MD               Where to Get Your Medications        These medications were sent to [id number] - [place], [place] - [address] [place] [id number]      Phone: [phone]   fluticasone propionate 50 mcg/actuation nasal spray  levocetirizine 5 MG tablet       These medications were sent to [facility]  [address] [place] [id number]      Hours: Mon-Fri, 8a-5:30p Phone: [phone]   pneumoc 20-geronimo conj-dip cr(PF) 0.5 mL Syrg injection       You can get these medications from any pharmacy    You don't need a prescription for these medications  omega 3-dha-epa-fish oil 1,200 (144-216) mg Cap  vitamin E 400 UNIT capsule       Information about where to get these medications is not yet available    Ask your nurse or doctor about these medications  ergocalciferol 50,000 unit Cap         Signing Physician:  Arpan Bowens MD

## 2024-04-03 ENCOUNTER — OFFICE VISIT (OUTPATIENT)
Dept: SLEEP MEDICINE | Facility: CLINIC | Age: 34
End: 2024-04-03
Payer: COMMERCIAL

## 2024-04-03 VITALS
DIASTOLIC BLOOD PRESSURE: 78 MMHG | WEIGHT: 156.5 LBS | HEIGHT: 62 IN | SYSTOLIC BLOOD PRESSURE: 126 MMHG | HEART RATE: 84 BPM | BODY MASS INDEX: 28.8 KG/M2

## 2024-04-03 DIAGNOSIS — G47.33 OSA (OBSTRUCTIVE SLEEP APNEA): ICD-10-CM

## 2024-04-03 PROCEDURE — 99999 PR PBB SHADOW E&M-EST. PATIENT-LVL III: CPT | Mod: PBBFAC,,, | Performed by: NURSE PRACTITIONER

## 2024-04-03 PROCEDURE — 3078F DIAST BP <80 MM HG: CPT | Mod: CPTII,S$GLB,, | Performed by: NURSE PRACTITIONER

## 2024-04-03 PROCEDURE — 99214 OFFICE O/P EST MOD 30 MIN: CPT | Mod: S$GLB,,, | Performed by: NURSE PRACTITIONER

## 2024-04-03 PROCEDURE — 3074F SYST BP LT 130 MM HG: CPT | Mod: CPTII,S$GLB,, | Performed by: NURSE PRACTITIONER

## 2024-04-03 PROCEDURE — 1159F MED LIST DOCD IN RCRD: CPT | Mod: CPTII,S$GLB,, | Performed by: NURSE PRACTITIONER

## 2024-04-03 PROCEDURE — 3044F HG A1C LEVEL LT 7.0%: CPT | Mod: CPTII,S$GLB,, | Performed by: NURSE PRACTITIONER

## 2024-04-03 PROCEDURE — 3008F BODY MASS INDEX DOCD: CPT | Mod: CPTII,S$GLB,, | Performed by: NURSE PRACTITIONER

## 2024-04-03 NOTE — PROGRESS NOTES
"Cc: KASH, new to me     Underwent HST 1/2021 but never began cpap. Was planning to get machine out pocket then. Ongoing snoring, and unrefreshing sleep.  Wants to try cpap to see if it helps him rest better. After awakening from a 3hr sedation/procedure he felt it was the best sleep    /78 (BP Location: Left arm, Patient Position: Sitting, BP Method: Small (Automatic))   Pulse 84   Ht 5' 2" (1.575 m)   Wt 71 kg (156 lb 8.4 oz)   BMI 28.63 kg/m²     ASSESSMENT:  KASH, ready to begin PAP    PLAN:  Out of pocket apap 6-12 cm rtc 4-6 wks after begin using  SIGKATar fit kraig  "

## 2024-04-07 ENCOUNTER — PATIENT MESSAGE (OUTPATIENT)
Dept: INTERNAL MEDICINE | Facility: CLINIC | Age: 34
End: 2024-04-07
Payer: COMMERCIAL

## 2024-04-07 DIAGNOSIS — E55.9 VITAMIN D DEFICIENCY: ICD-10-CM

## 2024-04-07 DIAGNOSIS — J30.1 CHRONIC ALLERGIC RHINITIS DUE TO POLLEN: ICD-10-CM

## 2024-04-07 RX ORDER — FLUTICASONE PROPIONATE 50 MCG
1 SPRAY, SUSPENSION (ML) NASAL DAILY
Qty: 16 G | Refills: 11 | Status: SHIPPED | OUTPATIENT
Start: 2024-04-07

## 2024-04-07 RX ORDER — ERGOCALCIFEROL 1.25 MG/1
50000 CAPSULE ORAL
Qty: 12 CAPSULE | Refills: 3 | Status: SHIPPED | OUTPATIENT
Start: 2024-04-07

## 2024-04-07 RX ORDER — LEVOCETIRIZINE DIHYDROCHLORIDE 5 MG/1
5 TABLET, FILM COATED ORAL DAILY PRN
Qty: 90 TABLET | Refills: 3 | Status: SHIPPED | OUTPATIENT
Start: 2024-04-07

## 2024-04-23 ENCOUNTER — OFFICE VISIT (OUTPATIENT)
Dept: GASTROENTEROLOGY | Facility: CLINIC | Age: 34
End: 2024-04-23
Payer: COMMERCIAL

## 2024-04-23 VITALS — HEIGHT: 62 IN | BODY MASS INDEX: 28.69 KG/M2 | WEIGHT: 155.88 LBS

## 2024-04-23 DIAGNOSIS — K29.50 CHRONIC HELICOBACTER PYLORI GASTRITIS: ICD-10-CM

## 2024-04-23 DIAGNOSIS — B96.81 CHRONIC HELICOBACTER PYLORI GASTRITIS: ICD-10-CM

## 2024-04-23 DIAGNOSIS — K30 FUNCTIONAL DYSPEPSIA: Primary | ICD-10-CM

## 2024-04-23 PROCEDURE — 99999 PR PBB SHADOW E&M-EST. PATIENT-LVL III: CPT | Mod: PBBFAC,,, | Performed by: INTERNAL MEDICINE

## 2024-04-23 PROCEDURE — 3044F HG A1C LEVEL LT 7.0%: CPT | Mod: CPTII,S$GLB,, | Performed by: INTERNAL MEDICINE

## 2024-04-23 PROCEDURE — 99204 OFFICE O/P NEW MOD 45 MIN: CPT | Mod: S$GLB,,, | Performed by: INTERNAL MEDICINE

## 2024-04-23 PROCEDURE — 1159F MED LIST DOCD IN RCRD: CPT | Mod: CPTII,S$GLB,, | Performed by: INTERNAL MEDICINE

## 2024-04-23 PROCEDURE — 3008F BODY MASS INDEX DOCD: CPT | Mod: CPTII,S$GLB,, | Performed by: INTERNAL MEDICINE

## 2024-04-23 RX ORDER — CHLORDIAZEPOXIDE HYDROCHLORIDE AND CLIDINIUM BROMIDE 5; 2.5 MG/1; MG/1
1 CAPSULE ORAL 3 TIMES DAILY PRN
Qty: 90 CAPSULE | Refills: 3 | Status: SHIPPED | OUTPATIENT
Start: 2024-04-23 | End: 2024-04-29

## 2024-04-23 NOTE — PROGRESS NOTES
GASTROENTEROLOGY CLINIC NOTE    Reason for visit: The primary encounter diagnosis was Functional dyspepsia. A diagnosis of Chronic Helicobacter pylori gastritis was also pertinent to this visit.  Referring provider/PCP: Arpan Bowens MD    HPI:  Phoenix Jones is a 33 y.o. male here today for stomach pain, new patient (last visit > 3 years ago)  joshua used for translation (phone)    Interval  Last visit > 3 years ago  Here for post prandial bloating. Ongoing for years  but now here worse recenlty in past 3 months. Worse after barbeque and teriyaki foods, and with seafood.  He specifically mentions stress as large part of life and he asks if stress can contribute to these symptoms.  No wt loss, no blood in stool, no vomiting.  He has noticed these symptoms are different than when he had hyplori, that was more of burning / tingling, this is more bloat.    Previously treated for HP , confirmed eradication.      =====================  Initial clinic visit  At least 4-6 months epigastric discomfort.  There seems to be a distention in a bloating.  He also has a throat discomfort and feels like he has something in his throat.  He mentions some back pain and he was told it may be related to his stomach.  He also mentioned dizziness, fatigue, leg and arm weakness and numbness.  He has multiple other systemic complaints.  He feels like his stools a little bit more soft to loose in nature.  There is no blood in the stool.  He does not have vomiting, just nausea.  Taking pepcid and that doesn't help.    Has been to the ER and has had a lab workup and I have reviewed these labs and they are all normal.      Prior Endoscopy:  EGD:   5/2021 egd with me  Findings:       The examined esophagus was normal.        Diffuse mild inflammation characterized by friability and        granularity was found in the gastric body. Biopsies were taken with        a cold forceps for Helicobacter pylori testing. recently treated and         confirmed eradication for Hpylori.        The cardia and gastric fundus were normal on retroflexion.        A few localized erosions without bleeding were found in the duodenal        bulb. Biopsies for histology were taken with a cold forceps for        evaluation of celiac disease. Biopsies for celiac  into 2        jars, jar1 3rd portion (normal appearing) and duodenal bulb jar2        (appears erythematous, likely from recent Hpylori)     1.  Duodenum, 3rd and 4th portion, biopsy:   - Small intestinal mucosa with preserved villous architecture.   - Negative for increased intraepithelial lymphocytes, granulomata or   dysplasia.   2. Duodenal bulb, biopsy:   - Small intestinal mucosa with preserved villous architecture.   - Negative for increased intraepithelial lymphocytes, granulomata or   dysplasia.   3. Random stomach, biopsy:   - Gastric type mucosa with moderate chronic gastritis and reactive changes.   - Immunohistochemistry with appropriate control for H pylori is negative.     Colon none      (Portions of this note were dictated using voice recognition software and may contain dictation related errors in spelling/grammar/syntax not found on text review)    Review of Systems   Constitutional: Negative for fever and malaise/fatigue.   Respiratory: Negative for cough and shortness of breath.    Cardiovascular: Negative for chest pain and palpitations.   Gastrointestinal: Positive for abdominal pain and nausea. Negative for blood in stool and vomiting.   Neurological: Negative for dizziness and headaches.       Past Medical History: has a past medical history of Bicuspid aortic valve, Chronic allergic rhinitis due to pollen, Chronic superficial gastritis without bleeding, Hepatic steatosis, History of Helicobacter pylori infection, KASH (obstructive sleep apnea), and Vitamin D deficiency.    Past Surgical History: has a past surgical history that includes Esophagogastroduodenoscopy (05/12/2021) and  "Esophagogastroduodenoscopy (N/A, 5/12/2021).    Family History:family history includes No Known Problems in his father, mother, and sister; Thyroid disease in his daughter.    Allergies: Review of patient's allergies indicates:  No Known Allergies    Social History: reports that he has quit smoking. He has never used smokeless tobacco. He reports that he does not drink alcohol and does not use drugs.    Home medications:   Current Outpatient Medications on File Prior to Visit   Medication Sig Dispense Refill    ergocalciferol (ERGOCALCIFEROL) 50,000 unit Cap Take 1 capsule (50,000 Units total) by mouth every 7 days. 12 capsule 3    fluticasone propionate (FLONASE) 50 mcg/actuation nasal spray 1 spray (50 mcg total) by Each Nostril route once daily. 16 g 11    levocetirizine (XYZAL) 5 MG tablet Take 1 tablet (5 mg total) by mouth daily as needed for Allergies. 90 tablet 3    omega 3-dha-epa-fish oil 1,200 (144-216) mg Cap Take 2 capsules by mouth once daily.      vitamin E 400 UNIT capsule Take 1 capsule (400 Units total) by mouth once daily.       No current facility-administered medications on file prior to visit.       Vital signs:  Ht 5' 2" (1.575 m)   Wt 70.7 kg (155 lb 13.8 oz)   BMI 28.51 kg/m²     Physical Exam  Vitals reviewed.   Constitutional:       Appearance: He is not toxic-appearing.   HENT:      Head: Normocephalic and atraumatic.   Eyes:      General: No scleral icterus.     Conjunctiva/sclera: Conjunctivae normal.   Abdominal:      General: There is no distension.      Palpations: Abdomen is soft. There is no mass.      Tenderness: There is no abdominal tenderness.   Neurological:      Mental Status: He is alert and oriented to person, place, and time.      Gait: Gait normal.   Psychiatric:         Mood and Affect: Mood normal.         Behavior: Behavior normal.         I have reviewed prior labs, imaging, notes from last month    Assessment:  1. Functional dyspepsia    2. Chronic Helicobacter " pylori gastritis        S/p treatment for HP with triple therapy, confirmed eradication    Now with continued bloating , he believes it could be stress.   I suspect this is functional dyspepsia.    Plan:  Orders Placed This Encounter    H. pylori antigen, stool    chlordiazepoxide-clidinium 5-2.5 mg (LIBRAX) 5-2.5 mg Cap     Trial librax   Consider FD guard down the road.    HPylori stool to re-confirm eradication    Avoid trigger foods.      RTC prn    Emerson Fontaine MD  [facility]

## 2024-04-23 NOTE — LETTER
April 23, 2024        Arpan Bowens MD  [address]  [facility] [address]             [facility]  [address]  [address]  [facility] [address]  Phone: [phone]   Patient: Phoenix Jones   MR Number: [id number]   YOB: 1990   Date of Visit: 4/23/2024       Dear Dr. Bowens:    Thank you for referring Phoenix Jones to me for evaluation. Below are the relevant portions of my assessment and plan of care.            If you have questions, please do not hesitate to call me. I look forward to following Phoenix along with you.    Sincerely,      Emerson Fontaine MD           CC    No Recipients

## 2024-04-29 ENCOUNTER — OFFICE VISIT (OUTPATIENT)
Dept: INTERNAL MEDICINE | Facility: CLINIC | Age: 34
End: 2024-04-29
Payer: COMMERCIAL

## 2024-04-29 VITALS
DIASTOLIC BLOOD PRESSURE: 70 MMHG | WEIGHT: 155.63 LBS | RESPIRATION RATE: 18 BRPM | OXYGEN SATURATION: 98 % | SYSTOLIC BLOOD PRESSURE: 120 MMHG | HEART RATE: 89 BPM | BODY MASS INDEX: 28.47 KG/M2

## 2024-04-29 DIAGNOSIS — J30.1 CHRONIC ALLERGIC RHINITIS DUE TO POLLEN: ICD-10-CM

## 2024-04-29 DIAGNOSIS — E66.3 OVERWEIGHT (BMI 25.0-29.9): ICD-10-CM

## 2024-04-29 DIAGNOSIS — Z23 NEED FOR PNEUMOCOCCAL 20-VALENT CONJUGATE VACCINATION: ICD-10-CM

## 2024-04-29 DIAGNOSIS — K29.30 CHRONIC SUPERFICIAL GASTRITIS WITHOUT BLEEDING: ICD-10-CM

## 2024-04-29 DIAGNOSIS — Z23 NEED FOR HEPATITIS A IMMUNIZATION: ICD-10-CM

## 2024-04-29 DIAGNOSIS — Z23 NEED FOR HEPATITIS B VACCINATION: ICD-10-CM

## 2024-04-29 DIAGNOSIS — E55.9 VITAMIN D DEFICIENCY: ICD-10-CM

## 2024-04-29 DIAGNOSIS — K76.0 HEPATIC STEATOSIS: Primary | ICD-10-CM

## 2024-04-29 DIAGNOSIS — A04.8 H. PYLORI INFECTION: ICD-10-CM

## 2024-04-29 DIAGNOSIS — R22.1 NECK MASS: ICD-10-CM

## 2024-04-29 DIAGNOSIS — G47.33 OSA (OBSTRUCTIVE SLEEP APNEA): ICD-10-CM

## 2024-04-29 DIAGNOSIS — Q23.1 BICUSPID AORTIC VALVE: ICD-10-CM

## 2024-04-29 PROCEDURE — 3008F BODY MASS INDEX DOCD: CPT | Mod: CPTII,S$GLB,, | Performed by: INTERNAL MEDICINE

## 2024-04-29 PROCEDURE — 90471 IMMUNIZATION ADMIN: CPT | Mod: S$GLB,,, | Performed by: INTERNAL MEDICINE

## 2024-04-29 PROCEDURE — 3044F HG A1C LEVEL LT 7.0%: CPT | Mod: CPTII,S$GLB,, | Performed by: INTERNAL MEDICINE

## 2024-04-29 PROCEDURE — 3074F SYST BP LT 130 MM HG: CPT | Mod: CPTII,S$GLB,, | Performed by: INTERNAL MEDICINE

## 2024-04-29 PROCEDURE — 90677 PCV20 VACCINE IM: CPT | Mod: S$GLB,,, | Performed by: INTERNAL MEDICINE

## 2024-04-29 PROCEDURE — 1159F MED LIST DOCD IN RCRD: CPT | Mod: CPTII,S$GLB,, | Performed by: INTERNAL MEDICINE

## 2024-04-29 PROCEDURE — 99999 PR PBB SHADOW E&M-EST. PATIENT-LVL IV: CPT | Mod: PBBFAC,,, | Performed by: INTERNAL MEDICINE

## 2024-04-29 PROCEDURE — 3078F DIAST BP <80 MM HG: CPT | Mod: CPTII,S$GLB,, | Performed by: INTERNAL MEDICINE

## 2024-04-29 PROCEDURE — 99214 OFFICE O/P EST MOD 30 MIN: CPT | Mod: 25,S$GLB,, | Performed by: INTERNAL MEDICINE

## 2024-04-29 RX ORDER — PANTOPRAZOLE SODIUM 20 MG/1
20 TABLET, DELAYED RELEASE ORAL 2 TIMES DAILY
Qty: 28 TABLET | Refills: 0 | Status: SHIPPED | OUTPATIENT
Start: 2024-04-29 | End: 2024-05-13

## 2024-04-29 RX ORDER — METRONIDAZOLE 500 MG/1
500 TABLET ORAL 2 TIMES DAILY
Qty: 28 TABLET | Refills: 0 | Status: SHIPPED | OUTPATIENT
Start: 2024-04-29 | End: 2024-05-13

## 2024-04-29 RX ORDER — DOXYCYCLINE HYCLATE 100 MG
100 TABLET ORAL 2 TIMES DAILY
Qty: 28 TABLET | Refills: 0 | Status: SHIPPED | OUTPATIENT
Start: 2024-04-29 | End: 2024-05-13

## 2024-04-29 NOTE — PROGRESS NOTES
INTERNAL MEDICINE CLINIC  Follow-up Visit Progress Note     PRESENTING HISTORY     PCP: Arpan Bowens MD    Last Clinic Visit with me:  4-2-24    Current Chief Complaint/Problem:    Chief Complaint   Patient presents with    Follow-up      History of Present Illness & ROS: Mr. Phoenix Jones is a 33 y.o. male.    He complaints of fullness in the neck area especially left side.  No pain. No fever.    Reviewed H pylori history and vaccination history.    Parents have H pylori  - getting treated now.    PAST HISTORY:     Past Medical History:   Diagnosis Date    Bicuspid aortic valve 04/02/2024 2-2021  The left ventricle is normal in size with normal systolic function. The estimated ejection fraction is 60%  Normal right ventricular size with normal right ventricular systolic function.  Normal left ventricular diastolic function.  Bicuspid aortic valve with normal leaflet mobility and mild aortic regurgitation.  The estimated PA systolic pressure is 39 mmHg.  Normal central venous pressu    Chronic allergic rhinitis due to pollen 04/02/2024    Chronic superficial gastritis without bleeding 08/22/2023    EGD 5-  - Normal esophagus.    - Gastritis. Biopsied.   - Duodenal erosions without bleeding. Biopsied.   The biopsies show some chronic inflammation of the stomach, which is likely from the Hpylori that was treated IN THE PAST. There is NO evidence of ongoing Hpylori infection (no ACTIVE infection, this is both by biopsy as well as the negative stool test months ago)      H. pylori infection 04/29/2024    Re treatment 4-2024 Quadruple therapy      Hepatic steatosis 08/22/2023    3-2021 US - mild hepatic steatosis    History of Helicobacter pylori infection     Treated    KASH (obstructive sleep apnea) 03/11/2021    Vitamin D deficiency 12/06/2020       Past Surgical History:   Procedure Laterality Date    ESOPHAGOGASTRODUODENOSCOPY  05/12/2021    ESOPHAGOGASTRODUODENOSCOPY N/A 5/12/2021    Procedure: EGD  (ESOPHAGOGASTRODUODENOSCOPY) Covid test 5/9/21;  Surgeon: Emerson Fontaine MD;  Location: [facility];  Service: Endoscopy;  Laterality: N/A;  with G / D biopsies       Family History   Problem Relation Name Age of Onset    No Known Problems Mother      No Known Problems Father      No Known Problems Sister      Thyroid disease Daughter         Social History     Socioeconomic History    Marital status: Legally    Tobacco Use    Smoking status: Former    Smokeless tobacco: Never   Substance and Sexual Activity    Alcohol use: No    Drug use: No    Sexual activity: Yes     Partners: Female   Social History Narrative    Self business.        From [facility].        : [place]        1 boy 1 girl.     Social Determinants of Health     Financial Resource Strain: Low Risk  (5/4/2020)    Overall Financial Resource Strain (CARDIA)     Difficulty of Paying Living Expenses: Not hard at all   Food Insecurity: No Food Insecurity (5/4/2020)    Hunger Vital Sign     Worried About Running Out of Food in the Last Year: Never true     Ran Out of Food in the Last Year: Never true   Transportation Needs: No Transportation Needs (5/4/2020)    PRAPARE - Transportation     Lack of Transportation (Medical): No     Lack of Transportation (Non-Medical): No   Physical Activity: Insufficiently Active (5/4/2020)    Exercise Vital Sign     Days of Exercise per Week: 1 day     Minutes of Exercise per Session: 10 min   Stress: No Stress Concern Present (5/4/2020)    [employer] - Occupational Stress Questionnaire     Feeling of Stress : Not at all   Social Connections: Unknown (5/4/2020)    Social Connection and Isolation Panel [NHANES]     Frequency of Communication with Friends and Family: More than three times a week     Frequency of Social Gatherings with Friends and Family: Three times a week     Active Member of Clubs or Organizations: No     Attends Club or Organization Meetings: Never      Marital Status:        MEDICATIONS & ALLERGIES:     Current Outpatient Medications on File Prior to Visit   Medication Sig Dispense Refill    ergocalciferol (ERGOCALCIFEROL) 50,000 unit Cap Take 1 capsule (50,000 Units total) by mouth every 7 days. 12 capsule 3    fluticasone propionate (FLONASE) 50 mcg/actuation nasal spray 1 spray (50 mcg total) by Each Nostril route once daily. 16 g 11    levocetirizine (XYZAL) 5 MG tablet Take 1 tablet (5 mg total) by mouth daily as needed for Allergies. 90 tablet 3    omega 3-dha-epa-fish oil 1,200 (144-216) mg Cap Take 2 capsules by mouth once daily.      vitamin E 400 UNIT capsule Take 1 capsule (400 Units total) by mouth once daily.      [DISCONTINUED] chlordiazepoxide-clidinium 5-2.5 mg (LIBRAX) 5-2.5 mg Cap Take 1 capsule by mouth 3 (three) times daily as needed (abdominal discomfort). 90 capsule 3     No current facility-administered medications on file prior to visit.        Review of patient's allergies indicates:  No Known Allergies    Medications Reconciliation:   I have reconciled the patient's home medications and discharge medications with the patient/family. I have updated all changes.  Refer to After-Visit Medication List.    OBJECTIVE:     Vital Signs:  Vitals:    04/29/24 1343   BP: 120/70   Pulse: 89   Resp: 18     Wt Readings from Last 3 Encounters:   04/29/24 1343 70.6 kg (155 lb 10.3 oz)   04/23/24 1029 70.7 kg (155 lb 13.8 oz)   04/03/24 1052 71 kg (156 lb 8.4 oz)     Body mass index is 28.47 kg/m².     Physical Exam:  General: Well developed, well nourished. No distress.  HEENT: Head is normocephalic, atraumatic  Neck without obvious mass.  Eyes: Clear conjunctiva.  Neck: Supple, symmetrical neck; trachea midline.  Lungs: normal respiratory effort.  Cardiovascular: Heart with regular rate and rhythm.    Extremities: No LE edema.   Skin: Skin color, texture, turgor normal. No rashes.  Musculoskeletal: Normal gait.   Psychiatric: Normal affect.  Alert.    Laboratory  Lab Results   Component Value Date    WBC 6.67 04/02/2024    HGB 14.2 04/02/2024    HCT 43.1 04/02/2024     04/02/2024    CHOL 206 (H) 04/02/2024    TRIG 161 (H) 04/02/2024    HDL 41 04/02/2024    ALT 30 04/02/2024    AST 18 04/02/2024     04/02/2024    K 3.9 04/02/2024     04/02/2024    CREATININE 0.9 04/02/2024    BUN 18 04/02/2024    CO2 25 04/02/2024    TSH 1.058 04/02/2024    INR 1.0 02/17/2019    HGBA1C 5.0 04/02/2024       ASSESSMENT & PLAN:     Hepatic steatosis   3-  - Mild Hepatic Steatosis  - Normal LFT.     Plan:  -     omega 3-dha-epa-fish oil 1,200 (144-216) mg Cap; Take 2 capsules by mouth once daily.  -     vitamin E 400 UNIT capsule; Take 1 capsule (400 Units total) by mouth once daily.     Vitamin D deficiency  -     ergocalciferol (ERGOCALCIFEROL) 50,000 unit Cap; Take 1 capsule (50,000 Units total) by mouth every 7 days.       Chronic superficial gastritis without bleeding  EGD 5-   - Normal esophagus.     - Gastritis. Biopsied.    - Duodenal erosions without bleeding. Biopsied.      The biopsies show some chronic inflammation of the stomach, which is likely from the Hpylori that was treated IN THE PAST.  There is NO evidence of ongoing Hpylori infection (no ACTIVE infection, this is both by biopsy as well as the negative stool test months ago)  H pylori stool antigen 4-: Negative     - Currently asymptomatic    H. pylori infection  - Re-treatment due to family infection.  -     bismuth subsalicylate 262 mg Tab; Take 2 tablets by mouth 2 (two) times a day. for 14 days  Dispense: 56 tablet; Refill: 0  -     metroNIDAZOLE (FLAGYL) 500 MG tablet; Take 1 tablet (500 mg total) by mouth 2 (two) times a day. for 14 days  Dispense: 28 tablet; Refill: 0  -     doxycycline (VIBRA-TABS) 100 MG tablet; Take 1 tablet (100 mg total) by mouth 2 (two) times daily. for 14 days  Dispense: 28 tablet; Refill: 0  -     pantoprazole (PROTONIX) 20 MG  tablet; Take 1 tablet (20 mg total) by mouth 2 (two) times daily. for 14 days  Dispense: 28 tablet; Refill: 0    KASH (obstructive sleep apnea)  Sleep Med 4-2024: : Out of pocket apap 6-12 cm rtc 4-6 wks after begin usingDreamwear fit kraig     Chronic allergic rhinitis due to pollen  -     fluticasone propionate (FLONASE) 50 mcg/actuation nasal spray; 1 spray (50 mcg total) by Each Nostril route once daily.   -     levocetirizine (XYZAL) 5 MG tablet; Take 1 tablet (5 mg total) by mouth daily as needed for Allergies.     Bicuspid aortic valve  2-2021  The left ventricle is normal in size with normal systolic function. The estimated ejection fraction is 60%  Normal right ventricular size with normal right ventricular systolic function.  Normal left ventricular diastolic function.  Bicuspid aortic valve with normal leaflet mobility and mild aortic regurgitation.  The estimated PA systolic pressure is 39 mmHg.  Normal central venous pressure (3 mmHg).     Overweight  Body mass index is 28.47 kg/m².    - Discussed diet and exercise.    Neck mass  -     US Soft Tissue Head Neck; Future; Expected date: 04/29/2024        Need for hepatitis B vaccination  -     hepatitis B virus vacc.rec,PF, (ENGERIX-B) 20 mcg/mL Syrg; Inject 1 mL (20 mcg total) into the muscle once. for 1 dose  Dispense: 1 mL; Refill: 0  -     hepatitis B virus vacc.rec,PF, (ENGERIX-B) 20 mcg/mL Syrg; Inject 1 mL (20 mcg total) into the muscle once. for 1 dose  Dispense: 1 mL; Refill: 0  -     hepatitis B virus vacc.rec,PF, (ENGERIX-B) 20 mcg/mL Syrg; Inject 1 mL (20 mcg total) into the muscle once. for 1 dose  Dispense: 1 mL; Refill: 0    Need for hepatitis A immunization  -     hepatitis A virus vaccine (Ped 12MO-18YRS)(PF) (HAVRIX) 720 Unit/0.5 mL syringe; Inject 0.5 mLs (720 Units total) into the muscle once. for 1 dose  Dispense: 0.5 mL; Refill: 0  -     hepatitis A virus vaccine (Ped 12MO-18YRS)(PF) (HAVRIX) 720 Unit/0.5 mL syringe; Inject 0.5 mLs (720  Units total) into the muscle once. for 1 dose  Dispense: 0.5 mL; Refill: 0    Preventive Health Maintenance:     Need for pneumococcal 20-valent conjugate vaccination  -     (In Office Administered) Pneumococcal Conjugate Vaccine (20 Valent) (IM) (Preferred)     Return to Clinic for Follow Up with me:   1 Year.    Scheduled Follow-up :  Future Appointments   Date Time Provider Department Center   5/7/2024  3:30 PM [facility]         After Visit Medication List :     Medication List            Accurate as of April 29, 2024  2:21 PM. If you have any questions, ask your nurse or doctor.                START taking these medications      bismuth subsalicylate 262 mg Tab  Take 2 tablets by mouth 2 (two) times a day. for 14 days  Started by: Arpan Bowens MD     doxycycline 100 MG tablet  Commonly known as: VIBRA-TABS  Take 1 tablet (100 mg total) by mouth 2 (two) times daily. for 14 days  Started by: Arpan Bowens MD     * hepatitis A virus vaccine (PEDS) 720 ISMA unit/0.5 mL Syrg  Commonly known as: HAVRIX  Inject 0.5 mLs (720 Units total) into the muscle once. for 1 dose  Started by: Arpan Bowens MD     * hepatitis A virus vaccine (PEDS) 720 ISMA unit/0.5 mL Syrg  Commonly known as: HAVRIX  Inject 0.5 mLs (720 Units total) into the muscle once. for 1 dose  Start taking on: October 28, 2024  Started by: Arpan Bowens MD     * hepatitis B virus vacc.rec(PF) 20 mcg/mL Syrg  Commonly known as: ENGERIX-B  Inject 1 mL (20 mcg total) into the muscle once. for 1 dose  Started by: Arpan Bowens MD     * hepatitis B virus vacc.rec(PF) 20 mcg/mL Syrg  Commonly known as: ENGERIX-B  Inject 1 mL (20 mcg total) into the muscle once. for 1 dose  Started by: Arpan Bowens MD     * hepatitis B virus vacc.rec(PF) 20 mcg/mL Syrg  Commonly known as: ENGERIX-B  Inject 1 mL (20 mcg total) into the muscle once. for 1 dose  Start taking on: October 28, 2024  Started by: Arpan Bowens MD     metroNIDAZOLE  500 MG tablet  Commonly known as: FLAGYL  Take 1 tablet (500 mg total) by mouth 2 (two) times a day. for 14 days  Started by: Arpan Bowens MD     pantoprazole 20 MG tablet  Commonly known as: PROTONIX  Take 1 tablet (20 mg total) by mouth 2 (two) times daily. for 14 days  Started by: Arpan Bowens MD           * This list has 5 medication(s) that are the same as other medications prescribed for you. Read the directions carefully, and ask your doctor or other care provider to review them with you.                CONTINUE taking these medications      ergocalciferol 50,000 unit Cap  Commonly known as: ERGOCALCIFEROL  Take 1 capsule (50,000 Units total) by mouth every 7 days.     fluticasone propionate 50 mcg/actuation nasal spray  Commonly known as: FLONASE  1 spray (50 mcg total) by Each Nostril route once daily.     levocetirizine 5 MG tablet  Commonly known as: XYZAL  Take 1 tablet (5 mg total) by mouth daily as needed for Allergies.     omega 3-dha-epa-fish oil 1,200 (144-216) mg Cap  Take 2 capsules by mouth once daily.     vitamin E 400 UNIT capsule  Take 1 capsule (400 Units total) by mouth once daily.            STOP taking these medications      chlordiazepoxide-clidinium 5-2.5 mg 5-2.5 mg Cap  Commonly known as: LIBRAX  Stopped by: Arpan Bowens MD               Where to Get Your Medications        These medications were sent to [facility]/pharmacy #[id number] - [place] - [address]  [address] [address]      Phone: [phone]   bismuth subsalicylate 262 mg Tab  doxycycline 100 MG tablet  hepatitis A virus vaccine (PEDS) 720 ISMA unit/0.5 mL Syrg  hepatitis A virus vaccine (PEDS) 720 ISMA unit/0.5 mL Syrg  hepatitis B virus vacc.rec(PF) 20 mcg/mL Syrg  hepatitis B virus vacc.rec(PF) 20 mcg/mL Syrg  hepatitis B virus vacc.rec(PF) 20 mcg/mL Syrg  metroNIDAZOLE 500 MG tablet  pantoprazole 20 MG tablet         Signing Physician:  Arpan Bowens MD

## 2024-04-30 ENCOUNTER — TELEPHONE (OUTPATIENT)
Dept: INTERNAL MEDICINE | Facility: CLINIC | Age: 34
End: 2024-04-30
Payer: COMMERCIAL

## 2024-05-07 ENCOUNTER — HOSPITAL ENCOUNTER (OUTPATIENT)
Dept: RADIOLOGY | Facility: HOSPITAL | Age: 34
Discharge: HOME | End: 2024-05-07
Attending: INTERNAL MEDICINE
Payer: COMMERCIAL

## 2024-05-07 DIAGNOSIS — R22.1 NECK MASS: ICD-10-CM

## 2024-05-07 PROCEDURE — 76536 US EXAM OF HEAD AND NECK: CPT | Mod: TC

## 2024-05-07 PROCEDURE — 76536 US EXAM OF HEAD AND NECK: CPT | Mod: 26,,, | Performed by: RADIOLOGY

## 2024-05-08 ENCOUNTER — TELEPHONE (OUTPATIENT)
Dept: INTERNAL MEDICINE | Facility: CLINIC | Age: 34
End: 2024-05-08
Payer: COMMERCIAL

## 2024-05-08 DIAGNOSIS — R59.1 LYMPHADENOPATHY: Primary | ICD-10-CM

## 2024-05-15 ENCOUNTER — OFFICE VISIT (OUTPATIENT)
Dept: OTOLARYNGOLOGY | Facility: CLINIC | Age: 34
End: 2024-05-15
Payer: COMMERCIAL

## 2024-05-15 VITALS — DIASTOLIC BLOOD PRESSURE: 79 MMHG | HEART RATE: 85 BPM | SYSTOLIC BLOOD PRESSURE: 137 MMHG

## 2024-05-15 DIAGNOSIS — R59.1 LYMPHADENOPATHY: Primary | ICD-10-CM

## 2024-05-15 PROCEDURE — 1159F MED LIST DOCD IN RCRD: CPT | Mod: CPTII,S$GLB,, | Performed by: OTOLARYNGOLOGY

## 2024-05-15 PROCEDURE — 3078F DIAST BP <80 MM HG: CPT | Mod: CPTII,S$GLB,, | Performed by: OTOLARYNGOLOGY

## 2024-05-15 PROCEDURE — 99999 PR PBB SHADOW E&M-EST. PATIENT-LVL III: CPT | Mod: PBBFAC,,, | Performed by: OTOLARYNGOLOGY

## 2024-05-15 PROCEDURE — 99204 OFFICE O/P NEW MOD 45 MIN: CPT | Mod: S$GLB,,, | Performed by: OTOLARYNGOLOGY

## 2024-05-15 PROCEDURE — 3075F SYST BP GE 130 - 139MM HG: CPT | Mod: CPTII,S$GLB,, | Performed by: OTOLARYNGOLOGY

## 2024-05-15 PROCEDURE — 3044F HG A1C LEVEL LT 7.0%: CPT | Mod: CPTII,S$GLB,, | Performed by: OTOLARYNGOLOGY

## 2024-05-15 NOTE — PROGRESS NOTES
Chief Complaint   Patient presents with    Lymphadenopathy         33 y.o. male presents for evaluation of new neck fullness. Had a recent US of the submandibular region that showed some mild LN enlargement, none pathologic. That fullness is now resolved but now with some base of neck pinprick soreness that comes and goes around the neck. No oral or throat pain.      Review of Systems     Constitutional: Negative for fatigue and unexpected weight change.   HENT: per HPI.  Eyes: Negative for visual disturbance.   Respiratory: Negative for shortness of breath, hemoptysis  Cardiovascular: Negative for chest pain and palpitations.   Genitourinary: Negative for dysuria and difficulty urinating.   Musculoskeletal: Negative for decreased ROM, back pain.   Skin: Negative for rash, sunburn, itching.   Neurological: Negative for dizziness and seizures.   Hematological: Negative for adenopathy. Does not bruise/bleed easily.   Psychiatric/Behavioral: Negative for agitation. The patient is not nervous/anxious.   Endocrine: Negative for rapid weight loss/weight gain, heat/cold intolerance.     Past Medical History:   Diagnosis Date    Bicuspid aortic valve 04/02/2024 2-2021  The left ventricle is normal in size with normal systolic function. The estimated ejection fraction is 60%  Normal right ventricular size with normal right ventricular systolic function.  Normal left ventricular diastolic function.  Bicuspid aortic valve with normal leaflet mobility and mild aortic regurgitation.  The estimated PA systolic pressure is 39 mmHg.  Normal central venous pressu    Chronic allergic rhinitis due to pollen 04/02/2024    Chronic superficial gastritis without bleeding 08/22/2023    EGD 5-  - Normal esophagus.    - Gastritis. Biopsied.   - Duodenal erosions without bleeding. Biopsied.   The biopsies show some chronic inflammation of the stomach, which is likely from the Hpylori that was treated IN THE PAST. There is NO  evidence of ongoing Hpylori infection (no ACTIVE infection, this is both by biopsy as well as the negative stool test months ago)      H. pylori infection 04/29/2024    Re treatment 4-2024 Quadruple therapy      Hepatic steatosis 08/22/2023    3-2021 US - mild hepatic steatosis    History of Helicobacter pylori infection     Treated    KASH (obstructive sleep apnea) 03/11/2021    Vitamin D deficiency 12/06/2020       Past Surgical History:   Procedure Laterality Date    ESOPHAGOGASTRODUODENOSCOPY  05/12/2021    ESOPHAGOGASTRODUODENOSCOPY N/A 5/12/2021    Procedure: EGD (ESOPHAGOGASTRODUODENOSCOPY) Covid test 5/9/21;  Surgeon: Emerson Fontaine MD;  Location: [facility];  Service: Endoscopy;  Laterality: N/A;  with G / D biopsies       family history includes No Known Problems in his father, mother, and sister; Thyroid disease in his daughter.    Pt  reports that he has quit smoking. He has never used smokeless tobacco. He reports that he does not drink alcohol and does not use drugs.    Review of patient's allergies indicates:  No Known Allergies     Physical Exam    Vitals:    05/15/24 1136   BP: 137/79   Pulse: 85     There is no height or weight on file to calculate BMI.    Physical Exam  Vitals and nursing note reviewed.   Constitutional:       General: He is not in acute distress.     Appearance: He is well-developed. He is not diaphoretic.   HENT:      Head: Normocephalic and atraumatic.      Right Ear: Hearing and external ear normal. No decreased hearing noted.      Left Ear: Hearing and external ear normal. No decreased hearing noted.      Nose: Nose normal.      Mouth/Throat:      Mouth: No oral lesions.      Tongue: No lesions.      Palate: No mass and lesions.      Pharynx: Oropharynx is clear. Uvula midline.   Eyes:      General: Lids are normal.         Right eye: No discharge.         Left eye: No discharge.      Conjunctiva/sclera: Conjunctivae normal.      Pupils: Pupils are equal, round, and reactive  to light.   Neck:      Thyroid: No thyroid mass or thyromegaly.      Trachea: Trachea and phonation normal. No tracheal tenderness or tracheal deviation.   Cardiovascular:      Heart sounds: Normal heart sounds.   Pulmonary:      Breath sounds: Normal breath sounds. No stridor.   Abdominal:      Palpations: Abdomen is soft.   Musculoskeletal:      Cervical back: Normal range of motion and neck supple. No edema or erythema.   Lymphadenopathy:      Cervical: No cervical adenopathy.   Skin:     General: Skin is warm and dry.      Coloration: Skin is not pale.      Findings: No erythema or rash.   Neurological:      Mental Status: He is alert and oriented to person, place, and time.        US Neck 5/7/24:  FINDINGS:  Limited sonographic imaging of the submandibular region was performed.     There are multiple left submandibular lymph nodes, some demonstrating normal morphology and some with cortical thickening, the largest measuring 1.4 x 0.8 x 1.3 cm.    Assessment     1. Lymphadenopathy          Plan  In summary, Mr. Jones is a 33 year old male with normal head and neck exam today, reassurance given. Discussed US results without concerning findings. All questions were answered. Return to clinic if new concerns occur.

## 2024-05-29 ENCOUNTER — TELEPHONE (OUTPATIENT)
Dept: INTERNAL MEDICINE | Facility: CLINIC | Age: 34
End: 2024-05-29
Payer: COMMERCIAL

## 2024-05-29 DIAGNOSIS — R59.0 LYMPHADENOPATHY, SUBMANDIBULAR: Primary | ICD-10-CM

## 2024-06-10 ENCOUNTER — HOSPITAL ENCOUNTER (OUTPATIENT)
Dept: RADIOLOGY | Facility: HOSPITAL | Age: 34
Discharge: HOME | End: 2024-06-10
Attending: INTERNAL MEDICINE
Payer: COMMERCIAL

## 2024-06-10 DIAGNOSIS — R59.0 LYMPHADENOPATHY, SUBMANDIBULAR: ICD-10-CM

## 2024-06-10 PROCEDURE — 76536 US EXAM OF HEAD AND NECK: CPT | Mod: TC

## 2024-06-10 PROCEDURE — 76536 US EXAM OF HEAD AND NECK: CPT | Mod: 26,,, | Performed by: INTERNAL MEDICINE

## 2024-09-08 ENCOUNTER — HOSPITAL ENCOUNTER (EMERGENCY)
Facility: HOSPITAL | Age: 34
Discharge: HOME | End: 2024-09-08
Attending: EMERGENCY MEDICINE
Payer: COMMERCIAL

## 2024-09-08 VITALS
OXYGEN SATURATION: 96 % | HEART RATE: 82 BPM | SYSTOLIC BLOOD PRESSURE: 128 MMHG | RESPIRATION RATE: 16 BRPM | WEIGHT: 155 LBS | DIASTOLIC BLOOD PRESSURE: 81 MMHG | TEMPERATURE: 100 F | BODY MASS INDEX: 28.52 KG/M2 | HEIGHT: 62 IN

## 2024-09-08 DIAGNOSIS — R07.9 RIGHT-SIDED CHEST PAIN: ICD-10-CM

## 2024-09-08 DIAGNOSIS — R52 BODY ACHES: Primary | ICD-10-CM

## 2024-09-08 LAB
ALBUMIN SERPL BCP-MCNC: 4.2 G/DL (ref 3.5–5.2)
ALP SERPL-CCNC: 59 U/L (ref 55–135)
ALT SERPL W/O P-5'-P-CCNC: 32 U/L (ref 10–44)
ANION GAP SERPL CALC-SCNC: 10 MMOL/L (ref 8–16)
AST SERPL-CCNC: 21 U/L (ref 10–40)
BASOPHILS # BLD AUTO: 0.01 K/UL (ref 0–0.2)
BASOPHILS NFR BLD: 0.2 % (ref 0–1.9)
BILIRUB SERPL-MCNC: 0.4 MG/DL (ref 0.1–1)
BUN SERPL-MCNC: 10 MG/DL (ref 6–20)
CALCIUM SERPL-MCNC: 9.3 MG/DL (ref 8.7–10.5)
CHLORIDE SERPL-SCNC: 103 MMOL/L (ref 95–110)
CK SERPL-CCNC: 87 U/L (ref 20–200)
CO2 SERPL-SCNC: 28 MMOL/L (ref 23–29)
CREAT SERPL-MCNC: 0.9 MG/DL (ref 0.5–1.4)
DIFFERENTIAL METHOD BLD: ABNORMAL
EOSINOPHIL # BLD AUTO: 0 K/UL (ref 0–0.5)
EOSINOPHIL NFR BLD: 0.8 % (ref 0–8)
ERYTHROCYTE [DISTWIDTH] IN BLOOD BY AUTOMATED COUNT: 12.5 % (ref 11.5–14.5)
EST. GFR  (NO RACE VARIABLE): >60 ML/MIN/1.73 M^2
GLUCOSE SERPL-MCNC: 128 MG/DL (ref 70–110)
HCT VFR BLD AUTO: 42.1 % (ref 40–54)
HGB BLD-MCNC: 14.1 G/DL (ref 14–18)
IMM GRANULOCYTES # BLD AUTO: 0.01 K/UL (ref 0–0.04)
IMM GRANULOCYTES NFR BLD AUTO: 0.2 % (ref 0–0.5)
LYMPHOCYTES # BLD AUTO: 0.8 K/UL (ref 1–4.8)
LYMPHOCYTES NFR BLD: 17.4 % (ref 18–48)
MCH RBC QN AUTO: 29.1 PG (ref 27–31)
MCHC RBC AUTO-ENTMCNC: 33.5 G/DL (ref 32–36)
MCV RBC AUTO: 87 FL (ref 82–98)
MONOCYTES # BLD AUTO: 0.5 K/UL (ref 0.3–1)
MONOCYTES NFR BLD: 10.3 % (ref 4–15)
NEUTROPHILS # BLD AUTO: 3.4 K/UL (ref 1.8–7.7)
NEUTROPHILS NFR BLD: 71.1 % (ref 38–73)
NRBC BLD-RTO: 0 /100 WBC
PLATELET # BLD AUTO: 227 K/UL (ref 150–450)
PMV BLD AUTO: 9.8 FL (ref 9.2–12.9)
POTASSIUM SERPL-SCNC: 3.2 MMOL/L (ref 3.5–5.1)
PROT SERPL-MCNC: 8 G/DL (ref 6–8.4)
RBC # BLD AUTO: 4.85 M/UL (ref 4.6–6.2)
SODIUM SERPL-SCNC: 141 MMOL/L (ref 136–145)
WBC # BLD AUTO: 4.78 K/UL (ref 3.9–12.7)

## 2024-09-08 PROCEDURE — 82550 ASSAY OF CK (CPK): CPT | Performed by: EMERGENCY MEDICINE

## 2024-09-08 PROCEDURE — 25000003 PHARM REV CODE 250: Performed by: EMERGENCY MEDICINE

## 2024-09-08 PROCEDURE — 93010 ELECTROCARDIOGRAM REPORT: CPT | Mod: ,,, | Performed by: INTERNAL MEDICINE

## 2024-09-08 PROCEDURE — 99285 EMERGENCY DEPT VISIT HI MDM: CPT | Mod: 25

## 2024-09-08 PROCEDURE — 93005 ELECTROCARDIOGRAM TRACING: CPT

## 2024-09-08 PROCEDURE — 85025 COMPLETE CBC W/AUTO DIFF WBC: CPT | Performed by: EMERGENCY MEDICINE

## 2024-09-08 PROCEDURE — 80053 COMPREHEN METABOLIC PANEL: CPT | Performed by: EMERGENCY MEDICINE

## 2024-09-08 RX ORDER — ACETAMINOPHEN 500 MG
1000 TABLET ORAL
Status: COMPLETED | OUTPATIENT
Start: 2024-09-08 | End: 2024-09-08

## 2024-09-08 RX ADMIN — ACETAMINOPHEN 1000 MG: 500 TABLET ORAL at 05:09

## 2024-09-08 NOTE — ED PROVIDER NOTES
Emergency Department Provider Note    Phoenix Jones   34 y.o. male   [id number]      9/8/2024       History     This history was obtained from the patient utilizing the [employer] translation system (Get #[id number]).    He is a 34-year-old with the below past medical history. He complains of discomfort across his costal margin that began two days ago. He is also having pain to both flanks. He also had right-sided chest pain that is no longer present. He complains of generalized mild achy discomfort. He complains of lethargy as well. He has been under stress but denies depression, dizziness, cough, shortness of breath, abdominal pain, nausea, vomiting, dysuria, and difficulty urinating.  He has not taken medications to attempt treatment for his pain.         Past Medical History:   Diagnosis Date    Bicuspid aortic valve 04/02/2024 2-2021  The left ventricle is normal in size with normal systolic function. The estimated ejection fraction is 60%  Normal right ventricular size with normal right ventricular systolic function.  Normal left ventricular diastolic function.  Bicuspid aortic valve with normal leaflet mobility and mild aortic regurgitation.  The estimated PA systolic pressure is 39 mmHg.  Normal central venous pressu    Chronic allergic rhinitis due to pollen 04/02/2024    Chronic superficial gastritis without bleeding 08/22/2023    EGD 5-  - Normal esophagus.    - Gastritis. Biopsied.   - Duodenal erosions without bleeding. Biopsied.   The biopsies show some chronic inflammation of the stomach, which is likely from the Hpylori that was treated IN THE PAST. There is NO evidence of ongoing Hpylori infection (no ACTIVE infection, this is both by biopsy as well as the negative stool test months ago)      H. pylori infection 04/29/2024    Re treatment 4-2024 Quadruple therapy      Hepatic steatosis 08/22/2023    3-2021 US - mild hepatic steatosis    History of Helicobacter pylori infection     Treated    KASH  (obstructive sleep apnea) 03/11/2021    Vitamin D deficiency 12/06/2020      Past Surgical History:   Procedure Laterality Date    ESOPHAGOGASTRODUODENOSCOPY  05/12/2021    ESOPHAGOGASTRODUODENOSCOPY N/A 5/12/2021    Procedure: EGD (ESOPHAGOGASTRODUODENOSCOPY) Covid test 5/9/21;  Surgeon: Emerson Fontaine MD;  Location: [facility];  Service: Endoscopy;  Laterality: N/A;  with G / D biopsies      Family History   Problem Relation Name Age of Onset    No Known Problems Mother      No Known Problems Father      No Known Problems Sister      Thyroid disease Daughter        Social History     Socioeconomic History    Marital status: Legally    Tobacco Use    Smoking status: Former    Smokeless tobacco: Never   Substance and Sexual Activity    Alcohol use: No    Drug use: No    Sexual activity: Yes     Partners: Female   Social History Narrative    Self business.        From [facility].        : [place]        1 boy 1 girl.     Social Determinants of Health     Financial Resource Strain: Low Risk  (5/4/2020)    Overall Financial Resource Strain (CARDIA)     Difficulty of Paying Living Expenses: Not hard at all   Food Insecurity: No Food Insecurity (5/4/2020)    Hunger Vital Sign     Worried About Running Out of Food in the Last Year: Never true     Ran Out of Food in the Last Year: Never true   Transportation Needs: No Transportation Needs (5/4/2020)    PRAPARE - Transportation     Lack of Transportation (Medical): No     Lack of Transportation (Non-Medical): No   Physical Activity: Insufficiently Active (5/4/2020)    Exercise Vital Sign     Days of Exercise per Week: 1 day     Minutes of Exercise per Session: 10 min   Stress: No Stress Concern Present (5/4/2020)    [employer] - Occupational Stress Questionnaire     Feeling of Stress : Not at all      Review of patient's allergies indicates:  No Known Allergies        Physical Examination     Initial Vitals [09/08/24 1451]    BP Pulse Resp Temp SpO2   130/72 98 16 98.8 °F (37.1 °C) 97 %      MAP       --           Physical Exam    Nursing note and vitals reviewed.  Constitutional: He is not diaphoretic. No distress.   HENT:   Head: Normocephalic and atraumatic.   Mouth/Throat: Oropharynx is clear and moist.   Eyes: Conjunctivae are normal. No scleral icterus.   Neck: No JVD present.   Cardiovascular:  Normal rate, regular rhythm and normal heart sounds.     Exam reveals no gallop and no friction rub.       No murmur heard.  Pulmonary/Chest: Effort normal and breath sounds normal. No stridor. No respiratory distress. He has no decreased breath sounds. He has no wheezes. He has no rhonchi. He has no rales.   Abdominal: Abdomen is soft. He exhibits no distension. There is no abdominal tenderness.   No right CVA tenderness.  No left CVA tenderness.   Musculoskeletal:      Comments: No swelling, tenderness, or range of motion deficits throughout all extremities.     Neurological: He is alert and oriented to person, place, and time. GCS score is 15. GCS eye subscore is 4. GCS verbal subscore is 5. GCS motor subscore is 6.   Skin: Skin is warm and dry. No pallor.            Labs     Labs Reviewed   CBC W/ AUTO DIFFERENTIAL - Abnormal       Result Value    WBC 4.78      RBC 4.85      Hemoglobin 14.1      Hematocrit 42.1      MCV 87      MCH 29.1      MCHC 33.5      RDW 12.5      Platelets 227      MPV 9.8      Immature Granulocytes 0.2      Gran # (ANC) 3.4      Immature Grans (Abs) 0.01      Lymph # 0.8 (*)     Mono # 0.5      Eos # 0.0      Baso # 0.01      nRBC 0      Gran % 71.1      Lymph % 17.4 (*)     Mono % 10.3      Eosinophil % 0.8      Basophil % 0.2      Differential Method Automated     COMPREHENSIVE METABOLIC PANEL - Abnormal    Sodium 141      Potassium 3.2 (*)     Chloride 103      CO2 28      Glucose 128 (*)     BUN 10      Creatinine 0.9      Calcium 9.3      Total Protein 8.0      Albumin 4.2      Total Bilirubin 0.4       Alkaline Phosphatase 59      AST 21      ALT 32      eGFR >60.0      Anion Gap 10     CK    CPK 87          Imaging     Imaging Results              X-Ray Chest 1 View (Final result)  Result time 09/08/24 17:10:34      Final result by Matthew Zaldivar MD (09/08/24 17:10:34)                   Impression:      No acute abnormality.      Electronically signed by: Matthew Zaldivar MD  Date:    09/08/2024  Time:    17:10               Narrative:    EXAMINATION:  XR CHEST 1 VIEW    CLINICAL HISTORY:  Chest pain, unspecified    TECHNIQUE:  Single frontal view of the chest was performed.    COMPARISON:  Chest radiograph 10/13/2021    FINDINGS:  No detrimental change.  Symmetric appearing nipple shadows project over both lung bases.The lungs are well expanded and appear clear, with normal appearance of pulmonary vasculature and no pleural effusion or pneumothorax.    The cardiac silhouette is normal in size. The hilar and mediastinal contours are unremarkable.    Bones are intact.                                        ED Course     The patient received the following medications:  Medications   acetaminophen tablet 1,000 mg (1,000 mg Oral Given 9/8/24 1701)           ED Course as of 09/19/24 1555   Sun Sep 08, 2024   1647 The patient is well-appearing and has normal vital signs.  He has no concerning findings on physical exam.  I ordered basic labs to rule out symptomatic anemia, electrolyte anomalies, and renal failure.  CPK was also ordered to rule out rhabdo, which I feel is highly unlikely given his overall clinical picture.  I also ordered EKG and chest x-ray. [LP]   1652 EKG 12-lead  Independently interpreted by me:   Normal sinus rhythm. Ventricular rate 87 bpm.  Normal axis.  Normal QRS duration and QT interval.  No ST segment elevation or depression.  Normal T-wave morphology. Overall impression:  Normal EKG. [LP]      ED Course User Index  [LP] Kwan Newman III, MD        Medical Decision Making                  Medical Decision Making  See the above ED course section.  There were no concerning findings on workup.    Amount and/or Complexity of Data Reviewed  Labs: ordered.  Radiology: ordered.  ECG/medicine tests:  Decision-making details documented in ED Course.    Risk  OTC drugs.              Diagnoses       ICD-10-CM ICD-9-CM   1. Body aches  R52 780.96   2. Right-sided chest pain  R07.9 786.50         Dispostion     Patient signed out to Dr. Clements at shift change with labs and CXR pending.     Kwan Newman III, MD  09/19/24 [id number]

## 2024-09-08 NOTE — PROVIDER PROGRESS NOTES - EMERGENCY DEPT.
Encounter Date: 9/8/2024    ED Physician Progress Notes        Physician Note:   I received this patient in sign out from the previous team.  I have discussed the patient's history and presentation in the ED with Dr. Newman  The patient is a 34 y.o. male who presented to the ED with Generalized Body Aches    Significant history and PE findings:  34-year-old man with generalized body aches no fever no respiratory symptoms no known sick contacts, otherwise no significant medical history  Significant diagnostic results:  Patient has normal vital signs  Pending studies and/or consultations:  Labs and chest x-ray  Disposition:  Will continue to monitor, update patient on results of testing and determine appropriate additional treatment for the duration of stay in ED.  Pertinent lab and imaging findings are below.  Vargas Clements DO 5:17 PM 9/8/2024      5:44 PM  Patient is still has some symptoms, however somewhat improved.  No respiratory symptoms.  Moving all extremities and ambulating.  Discuss with  number [id number]  Patient has follow up with his primary physician coming up in the next couple of weeks but I urged him to return sooner if persistent symptoms.  Will watch fever trend at home in case this is sign of early viral syndrome.  Patient understands return precautions, discussion of symptomatic and supportive care and follow up instructions.

## 2024-09-08 NOTE — ED TRIAGE NOTES
Phoenix Jones, a 34 y.o. male presents to the ED w/ complaint of generalized body aches for about 2 days    Triage note:  Chief Complaint   Patient presents with    Generalized Body Aches     Review of patient's allergies indicates:  No Known Allergies  Past Medical History:   Diagnosis Date    Bicuspid aortic valve 04/02/2024 2-2021  The left ventricle is normal in size with normal systolic function. The estimated ejection fraction is 60%  Normal right ventricular size with normal right ventricular systolic function.  Normal left ventricular diastolic function.  Bicuspid aortic valve with normal leaflet mobility and mild aortic regurgitation.  The estimated PA systolic pressure is 39 mmHg.  Normal central venous pressu    Chronic allergic rhinitis due to pollen 04/02/2024    Chronic superficial gastritis without bleeding 08/22/2023    EGD 5-  - Normal esophagus.    - Gastritis. Biopsied.   - Duodenal erosions without bleeding. Biopsied.   The biopsies show some chronic inflammation of the stomach, which is likely from the Hpylori that was treated IN THE PAST. There is NO evidence of ongoing Hpylori infection (no ACTIVE infection, this is both by biopsy as well as the negative stool test months ago)      H. pylori infection 04/29/2024    Re treatment 4-2024 Quadruple therapy      Hepatic steatosis 08/22/2023    3-2021 US - mild hepatic steatosis    History of Helicobacter pylori infection     Treated    KASH (obstructive sleep apnea) 03/11/2021    Vitamin D deficiency 12/06/2020         APPEARANCE: awake and alert in NAD. PAIN  */10  SKIN: warm, dry and intact. No breakdown or bruising.  MUSCULOSKELETAL: Patient moving all extremities spontaneously, no obvious swelling or deformities noted. Ambulates independently.  RESPIRATORY: Denies shortness of breath.Respirations unlabored.   CARDIAC: Denies CP endorses tingling in chest, 2+ distal pulses; no peripheral edema  ABDOMEN: S/ND/NT, Denies nausea  : voids  spontaneously, denies difficulty  Neurologic: AAO x 4; follows commands equal strength in all extremities; denies numbness/tingling. Denies dizziness

## 2024-09-08 NOTE — DISCHARGE INSTRUCTIONS
You were evaluated in the emergency department today for acute aches and pains.  Although there were no findings of concern to necessitate admission to the hospital or warrant immediate surgical intervention, disease exists on a spectrum and your disease process may progress.  If this is the case, please watch your symptoms and return to the emergency department if you feel worse and are unable to discuss care with your primary care doctor in follow up in the next several days.  Specific information regarding your complaint has been provided.  Thank you for choosing [facility]!    You can take acetaminophen 650 mg every 6 hours, ibuprofen 600 mg every 6 hours for aches and pains.  Your lab work was normal today.  Your chest x-ray was normal.    Future Appointments   Date Time Provider Department Center   9/24/2024 10:30 AM Arpan Bowens MD [facility]

## 2024-09-08 NOTE — Clinical Note
"Phoenix Sylvester" Robert was seen and treated in our emergency department on 9/8/2024.  He may return to work on 09/09/2024.       If you have any questions or concerns, please don't hesitate to call.      Kwan Newman III, MD"

## 2024-09-09 LAB
OHS QRS DURATION: 102 MS
OHS QTC CALCULATION: 421 MS

## 2024-09-24 ENCOUNTER — PATIENT MESSAGE (OUTPATIENT)
Dept: INTERNAL MEDICINE | Facility: CLINIC | Age: 34
End: 2024-09-24

## 2024-11-19 ENCOUNTER — OFFICE VISIT (OUTPATIENT)
Dept: CARDIOLOGY | Facility: CLINIC | Age: 34
End: 2024-11-19
Payer: COMMERCIAL

## 2024-11-19 VITALS
HEART RATE: 87 BPM | SYSTOLIC BLOOD PRESSURE: 130 MMHG | OXYGEN SATURATION: 96 % | DIASTOLIC BLOOD PRESSURE: 80 MMHG | BODY MASS INDEX: 27.39 KG/M2 | WEIGHT: 154.56 LBS | HEIGHT: 63 IN

## 2024-11-19 DIAGNOSIS — Q23.81 BICUSPID AORTIC VALVE: Primary | ICD-10-CM

## 2024-11-19 DIAGNOSIS — R07.89 CHEST PAIN, ATYPICAL: ICD-10-CM

## 2024-11-19 DIAGNOSIS — G47.33 OSA (OBSTRUCTIVE SLEEP APNEA): ICD-10-CM

## 2024-11-19 PROCEDURE — 99999 PR PBB SHADOW E&M-EST. PATIENT-LVL III: CPT | Mod: PBBFAC,,, | Performed by: INTERNAL MEDICINE

## 2024-11-19 PROCEDURE — 99204 OFFICE O/P NEW MOD 45 MIN: CPT | Mod: S$GLB,,, | Performed by: INTERNAL MEDICINE

## 2024-11-19 PROCEDURE — 1159F MED LIST DOCD IN RCRD: CPT | Mod: CPTII,S$GLB,, | Performed by: INTERNAL MEDICINE

## 2024-11-19 PROCEDURE — 93000 ELECTROCARDIOGRAM COMPLETE: CPT | Mod: S$GLB,,, | Performed by: INTERNAL MEDICINE

## 2024-11-19 PROCEDURE — 3044F HG A1C LEVEL LT 7.0%: CPT | Mod: CPTII,S$GLB,, | Performed by: INTERNAL MEDICINE

## 2024-11-19 PROCEDURE — 3079F DIAST BP 80-89 MM HG: CPT | Mod: CPTII,S$GLB,, | Performed by: INTERNAL MEDICINE

## 2024-11-19 PROCEDURE — 3075F SYST BP GE 130 - 139MM HG: CPT | Mod: CPTII,S$GLB,, | Performed by: INTERNAL MEDICINE

## 2024-11-19 PROCEDURE — 3008F BODY MASS INDEX DOCD: CPT | Mod: CPTII,S$GLB,, | Performed by: INTERNAL MEDICINE

## 2024-11-19 NOTE — PROGRESS NOTES
Subjective   Patient ID:  Phoenix Jones is a 34 y.o. male who presents for evaluation of No chief complaint on file.      HPI    Bicuspid AV - mild AI, KASH    Went to the ER 9/8/24  He is a 34-year-old with the below past medical history. He complains of discomfort across his costal margin that began two days ago. He is also having pain to both flanks. He also had right-sided chest pain that is no longer present. He complains of generalized mild achy discomfort. He complains of lethargy as well. He has been under stress but denies depression, dizziness, cough, shortness of breath, abdominal pain, nausea, vomiting, dysuria, and difficulty urinating.  He has not taken medications to attempt treatment for his pain.     Echo 2/1/21  The left ventricle is normal in size with normal systolic function. The estimated ejection fraction is 60%  Normal right ventricular size with normal right ventricular systolic function.  Normal left ventricular diastolic function.  Bicuspid aortic valve with normal leaflet mobility and mild aortic regurgitation.  The estimated PA systolic pressure is 39 mmHg.  Normal central venous pressure (3 mmHg).     11/19/24 Here to establish care. Still with intermittenr right sided CP - usually at rest  BP controlled  EKG NSR - ok  Not a smoker    Review of Systems   Constitutional: Negative for decreased appetite.   HENT:  Negative for ear discharge.    Eyes:  Negative for blurred vision.   Endocrine: Negative for polyphagia.   Skin:  Negative for nail changes.   Genitourinary:  Negative for bladder incontinence.   Neurological:  Negative for aphonia.   Psychiatric/Behavioral:  Negative for hallucinations.    Allergic/Immunologic: Negative for hives.          Objective     Physical Exam  Constitutional:       Appearance: He is well-developed.   HENT:      Head: Normocephalic and atraumatic.   Eyes:      Conjunctiva/sclera: Conjunctivae normal.      Pupils: Pupils are equal, round, and reactive to light.    Cardiovascular:      Rate and Rhythm: Normal rate.      Pulses: Intact distal pulses.      Heart sounds: Murmur heard.      Systolic murmur is present with a grade of 2/6.   Pulmonary:      Effort: Pulmonary effort is normal.      Breath sounds: Normal breath sounds.   Abdominal:      General: Bowel sounds are normal.      Palpations: Abdomen is soft.   Musculoskeletal:         General: Normal range of motion.      Cervical back: Normal range of motion and neck supple.   Skin:     General: Skin is warm and dry.   Neurological:      Mental Status: He is alert and oriented to person, place, and time.            Assessment and Plan     1. Bicuspid aortic valve    2. KASH (obstructive sleep apnea)    3. Chest pain, atypical        Plan:     Treadmill stress test and echo for CP and bicuspid AV    Advance Care Planning     Date: 11/19/2024  Patient did not wish or was not able to name a surrogate decision maker or provide an Advance Care Plan.

## 2024-11-20 LAB
OHS QRS DURATION: 98 MS
OHS QTC CALCULATION: 425 MS

## 2024-12-06 ENCOUNTER — HOSPITAL ENCOUNTER (OUTPATIENT)
Dept: CARDIOLOGY | Facility: HOSPITAL | Age: 34
Discharge: HOME | End: 2024-12-06
Attending: INTERNAL MEDICINE
Payer: COMMERCIAL

## 2024-12-06 VITALS
BODY MASS INDEX: 27.39 KG/M2 | HEIGHT: 63 IN | BODY MASS INDEX: 27.39 KG/M2 | WEIGHT: 154.56 LBS | HEIGHT: 63 IN | WEIGHT: 154.56 LBS

## 2024-12-06 DIAGNOSIS — Q23.81 BICUSPID AORTIC VALVE: ICD-10-CM

## 2024-12-06 DIAGNOSIS — R07.89 CHEST PAIN, ATYPICAL: ICD-10-CM

## 2024-12-06 DIAGNOSIS — G47.33 OSA (OBSTRUCTIVE SLEEP APNEA): ICD-10-CM

## 2024-12-06 LAB
ASCENDING AORTA: 2.54 CM
AV INDEX (PROSTH): 0.48
AV MEAN GRADIENT: 6.7 MMHG
AV PEAK GRADIENT: 13 MMHG
AV REGURGITATION PRESSURE HALF TIME: 522.77 MS
AV VALVE AREA BY VELOCITY RATIO: 1.4 CM²
AV VALVE AREA: 1.5 CM²
AV VELOCITY RATIO: 0.44
BSA FOR ECHO PROCEDURE: 1.77 M2
CV ECHO LV RWT: 0.29 CM
CV STRESS BASE HR: 93 BPM
DIASTOLIC BLOOD PRESSURE: 86 MMHG
DOP CALC AO PEAK VEL: 1.8 M/S
DOP CALC AO VTI: 33.6 CM
DOP CALC LVOT AREA: 3.1 CM2
DOP CALC LVOT DIAMETER: 2 CM
DOP CALC LVOT PEAK VEL: 0.8 M/S
DOP CALC LVOT STROKE VOLUME: 50.9 CM3
DOP CALC MV VTI: 19.9 CM
DOP CALCLVOT PEAK VEL VTI: 16.2 CM
E WAVE DECELERATION TIME: 203.48 MSEC
E/A RATIO: 1.1
E/E' RATIO: 10.4 M/S
ECHO LV POSTERIOR WALL: 0.8 CM (ref 0.6–1.1)
FRACTIONAL SHORTENING: 29.1 % (ref 28–44)
INTERVENTRICULAR SEPTUM: 0.9 CM (ref 0.6–1.1)
IVC DIAMETER: 1.6 CM
LA MAJOR: 4.9 CM
LA MINOR: 3.41 CM
LA WIDTH: 3.4 CM
LEFT ATRIUM SIZE: 3.97 CM
LEFT ATRIUM VOLUME INDEX: 26.7 ML/M2
LEFT ATRIUM VOLUME: 46.14 CM3
LEFT INTERNAL DIMENSION IN SYSTOLE: 3.9 CM (ref 2.1–4)
LEFT VENTRICLE DIASTOLIC VOLUME INDEX: 84.62 ML/M2
LEFT VENTRICLE DIASTOLIC VOLUME: 146.39 ML
LEFT VENTRICLE MASS INDEX: 99.8 G/M2
LEFT VENTRICLE SYSTOLIC VOLUME INDEX: 37.5 ML/M2
LEFT VENTRICLE SYSTOLIC VOLUME: 64.88 ML
LEFT VENTRICULAR INTERNAL DIMENSION IN DIASTOLE: 5.5 CM (ref 3.5–6)
LEFT VENTRICULAR MASS: 172.7 G
LV LATERAL E/E' RATIO: 9.75 M/S
LV SEPTAL E/E' RATIO: 11.14 M/S
LVED V (TEICH): 146.39 ML
LVES V (TEICH): 64.88 ML
LVOT MG: 1.69 MMHG
LVOT MV: 0.63 CM/S
MV MEAN GRADIENT: 1 MMHG
MV PEAK A VEL: 0.71 M/S
MV PEAK E VEL: 0.78 M/S
MV PEAK GRADIENT: 3 MMHG
MV STENOSIS PRESSURE HALF TIME: 59.01 MS
MV VALVE AREA BY CONTINUITY EQUATION: 2.56 CM2
MV VALVE AREA P 1/2 METHOD: 3.73 CM2
OHS CV CPX 1 MINUTE RECOVERY HEART RATE: 162 BPM
OHS CV CPX 85 PERCENT MAX PREDICTED HEART RATE MALE: 158
OHS CV CPX ESTIMATED METS: 15
OHS CV CPX MAX PREDICTED HEART RATE: 186
OHS CV CPX PATIENT IS FEMALE: 0
OHS CV CPX PATIENT IS MALE: 1
OHS CV CPX PEAK DIASTOLIC BLOOD PRESSURE: 86 MMHG
OHS CV CPX PEAK HEAR RATE: 190 BPM
OHS CV CPX PEAK RATE PRESSURE PRODUCT: NORMAL
OHS CV CPX PEAK SYSTOLIC BLOOD PRESSURE: 187 MMHG
OHS CV CPX PERCENT MAX PREDICTED HEART RATE ACHIEVED: 102
OHS CV CPX RATE PRESSURE PRODUCT PRESENTING: NORMAL
OHS CV RV/LV RATIO: 0.51 CM
PISA AR MAX VEL: 4.72 M/S
PISA TR MAX VEL: 2.16 M/S
PV PEAK GRADIENT: 4 MMHG
PV PEAK VELOCITY: 1.04 M/S
RA MAJOR: 4.98 CM
RA PRESSURE ESTIMATED: 3 MMHG
RA WIDTH: 2.87 CM
RIGHT VENTRICLE DIASTOLIC BASEL DIMENSION: 2.8 CM
RIGHT VENTRICULAR END-DIASTOLIC DIMENSION: 2.78 CM
RV TB RVSP: 5 MMHG
SINUS: 2.84 CM
STJ: 2.29 CM
STRESS ECHO POST EXERCISE DUR MIN: 13 MINUTES
STRESS ECHO POST EXERCISE DUR SEC: 1 SECONDS
SYSTOLIC BLOOD PRESSURE: 110 MMHG
TDI LATERAL: 0.08 M/S
TDI SEPTAL: 0.07 M/S
TDI: 0.08 M/S
TR MAX PG: 19 MMHG
TRICUSPID ANNULAR PLANE SYSTOLIC EXCURSION: 2.23 CM
TV REST PULMONARY ARTERY PRESSURE: 22 MMHG
Z-SCORE OF LEFT VENTRICULAR DIMENSION IN END DIASTOLE: 1.38
Z-SCORE OF LEFT VENTRICULAR DIMENSION IN END SYSTOLE: 2.16

## 2024-12-06 PROCEDURE — 93018 CV STRESS TEST I&R ONLY: CPT | Mod: ,,, | Performed by: INTERNAL MEDICINE

## 2024-12-06 PROCEDURE — 93017 CV STRESS TEST TRACING ONLY: CPT

## 2024-12-06 PROCEDURE — 93306 TTE W/DOPPLER COMPLETE: CPT

## 2024-12-06 PROCEDURE — 93306 TTE W/DOPPLER COMPLETE: CPT | Mod: 26,,, | Performed by: INTERNAL MEDICINE

## 2024-12-06 PROCEDURE — 93016 CV STRESS TEST SUPVJ ONLY: CPT | Mod: ,,, | Performed by: INTERNAL MEDICINE

## 2024-12-12 ENCOUNTER — TELEPHONE (OUTPATIENT)
Dept: INTERNAL MEDICINE | Facility: CLINIC | Age: 34
End: 2024-12-12
Payer: COMMERCIAL

## 2024-12-12 DIAGNOSIS — G43.809 OTHER MIGRAINE WITHOUT STATUS MIGRAINOSUS, NOT INTRACTABLE: Primary | ICD-10-CM

## 2024-12-12 PROBLEM — G43.909 MIGRAINE WITHOUT STATUS MIGRAINOSUS, NOT INTRACTABLE: Status: ACTIVE | Noted: 2024-12-12

## 2024-12-12 RX ORDER — ZOLMITRIPTAN 2.5 MG/1
1 SPRAY, METERED NASAL EVERY 12 HOURS PRN
Qty: 6 EACH | Refills: 11 | Status: SHIPPED | OUTPATIENT
Start: 2024-12-12

## 2024-12-13 NOTE — TELEPHONE ENCOUNTER
Outpatient Medication Detail     Disp Refills Start End WIL   ZOLMitriptan (ZOMIG) 2.5 mg Spry 6 each 11 12/12/2024 -- No   Sig - Route: 1 spray by Nasal route every 12 (twelve) hours as needed (Migraine). - Nasal   Sent to pharmacy as: ZOLMitriptan (ZOMIG) 2.5 mg Spry   Class: Normal   Order: [id number]   Date/Time Signed: 12/12/2024 18:31       E-Prescribing Status: Receipt confirmed by pharmacy (12/12/2024  6:31 PM CST)     Pharmacy    [id number] - [place] [address]    Associated Diagnoses    Other migraine without status migrainosus, not intractable  - Primary

## 2024-12-16 ENCOUNTER — OFFICE VISIT (OUTPATIENT)
Dept: CARDIOLOGY | Facility: CLINIC | Age: 34
End: 2024-12-16
Payer: COMMERCIAL

## 2024-12-16 VITALS
HEIGHT: 63 IN | SYSTOLIC BLOOD PRESSURE: 110 MMHG | BODY MASS INDEX: 27.39 KG/M2 | OXYGEN SATURATION: 98 % | WEIGHT: 154.56 LBS | HEART RATE: 90 BPM | DIASTOLIC BLOOD PRESSURE: 78 MMHG

## 2024-12-16 DIAGNOSIS — R07.89 CHEST PAIN, ATYPICAL: Primary | ICD-10-CM

## 2024-12-16 DIAGNOSIS — G47.33 OSA (OBSTRUCTIVE SLEEP APNEA): ICD-10-CM

## 2024-12-16 DIAGNOSIS — Q23.81 BICUSPID AORTIC VALVE: ICD-10-CM

## 2024-12-16 PROCEDURE — 99213 OFFICE O/P EST LOW 20 MIN: CPT | Mod: S$GLB,,, | Performed by: INTERNAL MEDICINE

## 2024-12-16 PROCEDURE — 3044F HG A1C LEVEL LT 7.0%: CPT | Mod: CPTII,S$GLB,, | Performed by: INTERNAL MEDICINE

## 2024-12-16 PROCEDURE — 3008F BODY MASS INDEX DOCD: CPT | Mod: CPTII,S$GLB,, | Performed by: INTERNAL MEDICINE

## 2024-12-16 PROCEDURE — 3074F SYST BP LT 130 MM HG: CPT | Mod: CPTII,S$GLB,, | Performed by: INTERNAL MEDICINE

## 2024-12-16 PROCEDURE — 1159F MED LIST DOCD IN RCRD: CPT | Mod: CPTII,S$GLB,, | Performed by: INTERNAL MEDICINE

## 2024-12-16 PROCEDURE — 99999 PR PBB SHADOW E&M-EST. PATIENT-LVL IV: CPT | Mod: PBBFAC,,, | Performed by: INTERNAL MEDICINE

## 2024-12-16 PROCEDURE — 3078F DIAST BP <80 MM HG: CPT | Mod: CPTII,S$GLB,, | Performed by: INTERNAL MEDICINE

## 2024-12-16 NOTE — PROGRESS NOTES
Subjective   Patient ID:  Phoenix Jones is a 34 y.o. male who presents for follow-up of No chief complaint on file.      HPI      Bicuspid AV - mild AI, KASH     Went to the ER 9/8/24  He is a 34-year-old with the below past medical history. He complains of discomfort across his costal margin that began two days ago. He is also having pain to both flanks. He also had right-sided chest pain that is no longer present. He complains of generalized mild achy discomfort. He complains of lethargy as well. He has been under stress but denies depression, dizziness, cough, shortness of breath, abdominal pain, nausea, vomiting, dysuria, and difficulty urinating.  He has not taken medications to attempt treatment for his pain.     Stress test 12/6/24    The patient exercised for 13 minutes 1 seconds on a Sander protocol, corresponding to a functional capacity of 15 estimated METS, achieving a peak heart rate of 190 bpm, which is 102% of the age predicted maximum heart rate.    The ECG portion of the study is negative for ischemia.    The patient reported no chest pain during the stress test.    There were no arrhythmias during stress.     Echo 12/6/24    Left Ventricle: The left ventricle is normal in size. Normal wall thickness. There is normal systolic function with a visually estimated ejection fraction of 60 - 65%. There is normal diastolic function.    Right Ventricle: Normal right ventricular cavity size. Systolic function is normal.    Aortic Valve: The aortic valve is a bicuspid valve with probable commisural fusion of the right and left cusps. There is mild aortic regurgitation.    Mitral Valve: There is mild regurgitation.    Tricuspid Valve: There is mild regurgitation.    IVC/SVC: Normal venous pressure at 3 mmHg.    11/19/24 Here to establish care. Still with intermittenr right sided CP - usually at rest  BP controlled  EKG NSR - ok  Not a smoker  Treadmill stress test and echo for CP and bicuspid AV     12/16/24 Mild  fatigue. Denies CP or SOB  BP controlled    Review of Systems   Constitutional: Negative for decreased appetite.   HENT:  Negative for ear discharge.    Eyes:  Negative for blurred vision.   Endocrine: Negative for polyphagia.   Skin:  Negative for nail changes.   Genitourinary:  Negative for bladder incontinence.   Neurological:  Negative for aphonia.   Psychiatric/Behavioral:  Negative for hallucinations.    Allergic/Immunologic: Negative for hives.          Objective     Physical Exam  Constitutional:       Appearance: He is well-developed.   HENT:      Head: Normocephalic and atraumatic.   Eyes:      Conjunctiva/sclera: Conjunctivae normal.      Pupils: Pupils are equal, round, and reactive to light.   Cardiovascular:      Rate and Rhythm: Normal rate.      Pulses: Intact distal pulses.      Heart sounds: Murmur heard.      Systolic murmur is present with a grade of 2/6.   Pulmonary:      Effort: Pulmonary effort is normal.      Breath sounds: Normal breath sounds.   Abdominal:      General: Bowel sounds are normal.      Palpations: Abdomen is soft.   Musculoskeletal:         General: Normal range of motion.      Cervical back: Normal range of motion and neck supple.   Skin:     General: Skin is warm and dry.   Neurological:      Mental Status: He is alert and oriented to person, place, and time.            Assessment and Plan     1. Chest pain, atypical    2. KASH (obstructive sleep apnea)    3. Bicuspid aortic valve        Plan:     Stress test ok. Bicuspid AV with mild AI and no AS  OV 1 year - repeat echo every 2-3 years    Advance Care Planning     Date: 12/16/2024  Patient did not wish or was not able to name a surrogate decision maker or provide an Advance Care Plan.

## 2024-12-26 ENCOUNTER — OFFICE VISIT (OUTPATIENT)
Dept: INTERNAL MEDICINE | Facility: CLINIC | Age: 34
End: 2024-12-26
Payer: COMMERCIAL

## 2024-12-26 ENCOUNTER — IMMUNIZATION (OUTPATIENT)
Dept: INTERNAL MEDICINE | Facility: CLINIC | Age: 34
End: 2024-12-26
Payer: COMMERCIAL

## 2024-12-26 VITALS
DIASTOLIC BLOOD PRESSURE: 70 MMHG | WEIGHT: 156.94 LBS | HEIGHT: 63 IN | SYSTOLIC BLOOD PRESSURE: 120 MMHG | HEART RATE: 92 BPM | BODY MASS INDEX: 27.81 KG/M2 | OXYGEN SATURATION: 96 %

## 2024-12-26 DIAGNOSIS — E66.3 OVERWEIGHT (BMI 25.0-29.9): ICD-10-CM

## 2024-12-26 DIAGNOSIS — A04.8 H. PYLORI INFECTION: ICD-10-CM

## 2024-12-26 DIAGNOSIS — K76.0 HEPATIC STEATOSIS: ICD-10-CM

## 2024-12-26 DIAGNOSIS — R22.1 NECK MASS: ICD-10-CM

## 2024-12-26 DIAGNOSIS — Z23 NEED FOR HEPATITIS A AND B VACCINATION: ICD-10-CM

## 2024-12-26 DIAGNOSIS — Z23 NEED FOR VACCINATION: Primary | ICD-10-CM

## 2024-12-26 DIAGNOSIS — K29.30 CHRONIC SUPERFICIAL GASTRITIS WITHOUT BLEEDING: ICD-10-CM

## 2024-12-26 DIAGNOSIS — E55.9 VITAMIN D DEFICIENCY: ICD-10-CM

## 2024-12-26 DIAGNOSIS — J30.1 CHRONIC ALLERGIC RHINITIS DUE TO POLLEN: ICD-10-CM

## 2024-12-26 DIAGNOSIS — G47.33 OSA (OBSTRUCTIVE SLEEP APNEA): ICD-10-CM

## 2024-12-26 DIAGNOSIS — Q23.81 BICUSPID AORTIC VALVE: ICD-10-CM

## 2024-12-26 DIAGNOSIS — G43.809 OTHER MIGRAINE WITHOUT STATUS MIGRAINOSUS, NOT INTRACTABLE: Primary | ICD-10-CM

## 2024-12-26 PROBLEM — R07.89 CHEST PAIN, ATYPICAL: Status: RESOLVED | Noted: 2024-11-19 | Resolved: 2024-12-26

## 2024-12-26 PROCEDURE — 99214 OFFICE O/P EST MOD 30 MIN: CPT | Mod: S$GLB,,, | Performed by: INTERNAL MEDICINE

## 2024-12-26 PROCEDURE — 3008F BODY MASS INDEX DOCD: CPT | Mod: CPTII,S$GLB,, | Performed by: INTERNAL MEDICINE

## 2024-12-26 PROCEDURE — 3078F DIAST BP <80 MM HG: CPT | Mod: CPTII,S$GLB,, | Performed by: INTERNAL MEDICINE

## 2024-12-26 PROCEDURE — 3074F SYST BP LT 130 MM HG: CPT | Mod: CPTII,S$GLB,, | Performed by: INTERNAL MEDICINE

## 2024-12-26 PROCEDURE — 90656 IIV3 VACC NO PRSV 0.5 ML IM: CPT | Mod: S$GLB,,, | Performed by: INTERNAL MEDICINE

## 2024-12-26 PROCEDURE — 3044F HG A1C LEVEL LT 7.0%: CPT | Mod: CPTII,S$GLB,, | Performed by: INTERNAL MEDICINE

## 2024-12-26 PROCEDURE — 99999 PR PBB SHADOW E&M-EST. PATIENT-LVL III: CPT | Mod: PBBFAC,,, | Performed by: INTERNAL MEDICINE

## 2024-12-26 PROCEDURE — 90471 IMMUNIZATION ADMIN: CPT | Mod: S$GLB,,, | Performed by: INTERNAL MEDICINE

## 2024-12-26 RX ORDER — ERGOCALCIFEROL 1.25 MG/1
50000 CAPSULE ORAL
Qty: 12 CAPSULE | Refills: 3 | Status: SHIPPED | OUTPATIENT
Start: 2024-12-26

## 2024-12-26 NOTE — PROGRESS NOTES
INTERNAL MEDICINE CLINIC  Follow-up Visit Progress Note     PRESENTING HISTORY     PCP: Arpan Bowens MD    Last Clinic Visit with me:  4-29-24    Current Chief Complaint/Problem:    Chief Complaint   Patient presents with    Headache     History of Present Illness & ROS: Mr. Phoenix Jones is a 34 y.o. male.    Has been having frequent headaches for the last few months.    Rx Zolmitriptan with some relief.  No work up of headache in the past.    No previous headache.   No neurological deficits.    PAST HISTORY:     Past Medical History:   Diagnosis Date    Bicuspid aortic valve 04/02/2024 2-2021  The left ventricle is normal in size with normal systolic function. The estimated ejection fraction is 60%  Normal right ventricular size with normal right ventricular systolic function.  Normal left ventricular diastolic function.  Bicuspid aortic valve with normal leaflet mobility and mild aortic regurgitation.  The estimated PA systolic pressure is 39 mmHg.  Normal central venous pressu    Chronic allergic rhinitis due to pollen 04/02/2024    Chronic superficial gastritis without bleeding 08/22/2023    EGD 5-  - Normal esophagus.    - Gastritis. Biopsied.   - Duodenal erosions without bleeding. Biopsied.   The biopsies show some chronic inflammation of the stomach, which is likely from the Hpylori that was treated IN THE PAST. There is NO evidence of ongoing Hpylori infection (no ACTIVE infection, this is both by biopsy as well as the negative stool test months ago)      H. pylori infection 04/29/2024    Re treatment 4-2024 Quadruple therapy      Hepatic steatosis 08/22/2023    3-2021 US - mild hepatic steatosis    History of Helicobacter pylori infection     Treated    KASH (obstructive sleep apnea) 03/11/2021    Vitamin D deficiency 12/06/2020       Past Surgical History:   Procedure Laterality Date    ESOPHAGOGASTRODUODENOSCOPY  05/12/2021    ESOPHAGOGASTRODUODENOSCOPY N/A 5/12/2021    Procedure: EGD  (ESOPHAGOGASTRODUODENOSCOPY) Covid test 5/9/21;  Surgeon: Emerson Fontaine MD;  Location: [facility];  Service: Endoscopy;  Laterality: N/A;  with G / D biopsies       Family History   Problem Relation Name Age of Onset    No Known Problems Mother      No Known Problems Father      No Known Problems Sister      Thyroid disease Daughter         Social History     Socioeconomic History    Marital status:    Tobacco Use    Smoking status: Former    Smokeless tobacco: Never   Substance and Sexual Activity    Alcohol use: No    Drug use: No    Sexual activity: Yes     Partners: Female   Social History Narrative    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        : Scott        1 boy 1 girl.     Social Drivers of Health     Financial Resource Strain: Low Risk  (5/4/2020)    Overall Financial Resource Strain (CARDIA)     Difficulty of Paying Living Expenses: Not hard at all   Food Insecurity: No Food Insecurity (5/4/2020)    Hunger Vital Sign     Worried About Running Out of Food in the Last Year: Never true     Ran Out of Food in the Last Year: Never true   Transportation Needs: No Transportation Needs (5/4/2020)    PRAPARE - Transportation     Lack of Transportation (Medical): No     Lack of Transportation (Non-Medical): No   Physical Activity: Insufficiently Active (5/4/2020)    Exercise Vital Sign     Days of Exercise per Week: 1 day     Minutes of Exercise per Session: 10 min   Stress: No Stress Concern Present (5/4/2020)    [employer] - Occupational Stress Questionnaire     Feeling of Stress : Not at all       MEDICATIONS & ALLERGIES:     Current Outpatient Medications on File Prior to Visit   Medication Sig Dispense Refill    ergocalciferol (ERGOCALCIFEROL) 50,000 unit Cap Take 1 capsule (50,000 Units total) by mouth every 7 days. 12 capsule 3    fluticasone propionate (FLONASE) 50 mcg/actuation nasal spray 1 spray (50 mcg total) by Each Nostril route once daily. 16 g 11     levocetirizine (XYZAL) 5 MG tablet Take 1 tablet (5 mg total) by mouth daily as needed for Allergies. (Patient not taking: Reported on 12/16/2024) 90 tablet 3    omega 3-dha-epa-fish oil 1,200 (144-216) mg Cap Take 2 capsules by mouth once daily.      pantoprazole (PROTONIX) 20 MG tablet Take 1 tablet (20 mg total) by mouth 2 (two) times daily. for 14 days 28 tablet 0    vitamin E 400 UNIT capsule Take 1 capsule (400 Units total) by mouth once daily.      ZOLMitriptan (ZOMIG) 2.5 mg Spry 1 spray by Nasal route every 12 (twelve) hours as needed (Migraine). 6 each 11     No current facility-administered medications on file prior to visit.        Review of patient's allergies indicates:  No Known Allergies    Medications Reconciliation:   I have reconciled the patient's home medications and discharge medications with the patient/family. I have updated all changes.  Refer to After-Visit Medication List.    OBJECTIVE:     Vital Signs:  Vitals:    12/26/24 1316   BP: 120/70   Pulse: 92     Wt Readings from Last 3 Encounters:   12/26/24 1316 71.2 kg (156 lb 15.5 oz)   12/16/24 1319 70.1 kg (154 lb 8.7 oz)   12/06/24 1512 70.1 kg (154 lb 8.7 oz)     Body mass index is 27.81 kg/m².     Physical Exam:  General: Well developed, well nourished. No distress.  HEENT: Head is normocephalic, atraumatic  Eyes: Clear conjunctiva.  Neck: Supple, symmetrical neck; trachea midline.  Lungs: Clear to auscultation bilaterally and normal respiratory effort.  Cardiovascular: Heart with regular rate and rhythm.    Extremities: No LE edema.  Abdomen: Abdomen is soft, non-tender non-distended with normal bowel sounds.  Skin: Skin color, texture, turgor normal. No rashes.  Musculoskeletal: Normal gait.   Psychiatric: Normal affect. Alert.    Laboratory  Lab Results   Component Value Date    WBC 4.78 09/08/2024    HGB 14.1 09/08/2024    HCT 42.1 09/08/2024     09/08/2024    CHOL 206 (H) 04/02/2024    TRIG 161 (H) 04/02/2024    HDL 41  04/02/2024    ALT 32 09/08/2024    AST 21 09/08/2024     09/08/2024    K 3.2 (L) 09/08/2024     09/08/2024    CREATININE 0.9 09/08/2024    BUN 10 09/08/2024    CO2 28 09/08/2024    TSH 1.058 04/02/2024    INR 1.0 02/17/2019    HGBA1C 5.0 04/02/2024       ASSESSMENT & PLAN:     Other migraine without status migrainosus, not intractable  - New onset headache.    Plan:  -     MRI Brain Without Contrast; Future; Expected date: 12/26/2024    Hepatic steatosis  US 3-  - Mild Hepatic Steatosis  - Normal LFT.    On:  -     omega 3-dha-epa-fish oil 1,200 (144-216) mg Cap; Take 2 capsules by mouth once daily.  -     vitamin E 400 UNIT capsule; Take 1 capsule (400 Units total) by mouth once daily.     Vitamin D deficiency  -     ergocalciferol (ERGOCALCIFEROL) 50,000 unit Cap; Take 1 capsule (50,000 Units total) by mouth every 7 days.       Chronic superficial gastritis without bleeding  EGD 5-   - Normal esophagus.     - Gastritis. Biopsied.    - Duodenal erosions without bleeding. Biopsied.      The biopsies show some chronic inflammation of the stomach, which is likely from the Hpylori that was treated IN THE PAST.  There is NO evidence of ongoing Hpylori infection (no ACTIVE infection, this is both by biopsy as well as the negative stool test months ago)  H pylori stool antigen 4-: Negative     - Currently asymptomatic     H. pylori infection  - Re-treatment due to family infection 4-2024     KASH (obstructive sleep apnea)  Sleep Med 4-2024: : Out of pocket apap 6-12 cm rtc 4-6 wks after begin usingDreamwear fit kraig     Chronic allergic rhinitis due to pollen  -     fluticasone propionate (FLONASE) 50 mcg/actuation nasal spray; 1 spray (50 mcg total) by Each Nostril route once daily.   -     levocetirizine (XYZAL) 5 MG tablet; Take 1 tablet (5 mg total) by mouth daily as needed for Allergies.     Bicuspid aortic valve  2-2021  The left ventricle is normal in size with normal systolic  function. The estimated ejection fraction is 60%  Normal right ventricular size with normal right ventricular systolic function.  Normal left ventricular diastolic function.  Bicuspid aortic valve with normal leaflet mobility and mild aortic regurgitation.  The estimated PA systolic pressure is 39 mmHg.  Normal central venous pressure (3 mmHg).     Overweight  Last Body mass index is 28.47 kg/m².  Today: Body mass index is 27.81 kg/m².    - Discussed diet and exercise.     Neck mass  -     US Soft Tissue Head Neck; Future; Expected date: 12/26/2024    Preventive Health Maintenance:     Flu vaccine    Need for hepatitis A and B vaccination  -     hepatitis A and B vaccine, PF, (TWINRIX) 720 ISMA unit- 20 mcg/mL Syrg suspension; Inject 1 mL (720 Units total) into the muscle once. for 1 dose  Dispense: 1 mL; Refill: 0     Return to Clinic for Follow Up with me:   1 Year.    Scheduled Follow-up :  Future Appointments   Date Time Provider Department Center   12/26/2024  2:00 PM Arpan Bowens MD [facility]       After Visit Medication List :     Medication List            Accurate as of December 26, 2024  1:52 PM. If you have any questions, ask your nurse or doctor.                START taking these medications      hepatitis A and B vaccine (PF) 720 ISMA unit- 20 mcg/mL Syrg suspension  Commonly known as: Twinrix  Inject 1 mL (720 Units total) into the muscle once. for 1 dose  Started by: Arpan Bowens MD            CONTINUE taking these medications      ergocalciferol 50,000 unit Cap  Commonly known as: ERGOCALCIFEROL  Take 1 capsule (50,000 Units total) by mouth every 7 days.     fluticasone propionate 50 mcg/actuation nasal spray  Commonly known as: FLONASE  1 spray (50 mcg total) by Each Nostril route once daily.     omega 3-dha-epa-fish oil 1,200 (144-216) mg Cap  Take 2 capsules by mouth once daily.     vitamin E 400 UNIT capsule  Take 1 capsule (400 Units total) by mouth once daily.     ZOLMitriptan  2.5 mg Spry  Commonly known as: ZOMIG  1 spray by Nasal route every 12 (twelve) hours as needed (Migraine).            STOP taking these medications      levocetirizine 5 MG tablet  Commonly known as: XYZAL  Stopped by: Arpan Bowens MD     pantoprazole 20 MG tablet  Commonly known as: PROTONIX  Stopped by: Arpan Bowens MD               Where to Get Your Medications        These medications were sent to [facility]/pharmacy #[id number] - [place] - [address] [place] [address]      Phone: [phone]   ergocalciferol 50,000 unit Cap       These medications were sent to [facility]  [address] [place] [id number]      Hours: Mon-Fri, 8a-5:30p Phone: [phone]   hepatitis A and B vaccine (PF) 720 ISMA unit- 20 mcg/mL Syrg suspension         Signing Physician:  Arpan Bowens MD

## 2025-01-19 ENCOUNTER — E-VISIT (OUTPATIENT)
Dept: INTERNAL MEDICINE | Facility: CLINIC | Age: 35
End: 2025-01-19
Payer: COMMERCIAL

## 2025-01-19 DIAGNOSIS — R10.31 RIGHT LOWER QUADRANT PAIN: Primary | ICD-10-CM

## 2025-01-19 PROCEDURE — 99421 OL DIG E/M SVC 5-10 MIN: CPT | Mod: ,,, | Performed by: INTERNAL MEDICINE

## 2025-01-20 ENCOUNTER — HOSPITAL ENCOUNTER (EMERGENCY)
Facility: HOSPITAL | Age: 35
Discharge: HOME | End: 2025-01-20
Attending: EMERGENCY MEDICINE
Payer: COMMERCIAL

## 2025-01-20 VITALS
HEIGHT: 63 IN | DIASTOLIC BLOOD PRESSURE: 62 MMHG | RESPIRATION RATE: 14 BRPM | SYSTOLIC BLOOD PRESSURE: 114 MMHG | OXYGEN SATURATION: 97 % | BODY MASS INDEX: 27.46 KG/M2 | HEART RATE: 74 BPM | WEIGHT: 155 LBS | TEMPERATURE: 98 F

## 2025-01-20 DIAGNOSIS — R10.31 RIGHT LOWER QUADRANT ABDOMINAL PAIN: Primary | ICD-10-CM

## 2025-01-20 PROBLEM — R10.9 ABDOMINAL PAIN: Status: ACTIVE | Noted: 2025-01-20

## 2025-01-20 LAB
ALBUMIN SERPL BCP-MCNC: 4.2 G/DL (ref 3.5–5.2)
ALP SERPL-CCNC: 61 U/L (ref 40–150)
ALT SERPL W/O P-5'-P-CCNC: 19 U/L (ref 10–44)
ANION GAP SERPL CALC-SCNC: 8 MMOL/L (ref 8–16)
AST SERPL-CCNC: 15 U/L (ref 10–40)
BASOPHILS # BLD AUTO: 0.02 K/UL (ref 0–0.2)
BASOPHILS NFR BLD: 0.3 % (ref 0–1.9)
BILIRUB SERPL-MCNC: 0.6 MG/DL (ref 0.1–1)
BILIRUB UR QL STRIP: NEGATIVE
BUN SERPL-MCNC: 14 MG/DL (ref 6–20)
BUN SERPL-MCNC: 15 MG/DL (ref 6–30)
CALCIUM SERPL-MCNC: 9.3 MG/DL (ref 8.7–10.5)
CHLORIDE SERPL-SCNC: 101 MMOL/L (ref 95–110)
CHLORIDE SERPL-SCNC: 102 MMOL/L (ref 95–110)
CLARITY UR REFRACT.AUTO: CLEAR
CO2 SERPL-SCNC: 25 MMOL/L (ref 23–29)
COLOR UR AUTO: YELLOW
CREAT SERPL-MCNC: 0.7 MG/DL (ref 0.5–1.4)
CREAT SERPL-MCNC: 0.8 MG/DL (ref 0.5–1.4)
DIFFERENTIAL METHOD BLD: NORMAL
EOSINOPHIL # BLD AUTO: 0.2 K/UL (ref 0–0.5)
EOSINOPHIL NFR BLD: 2.5 % (ref 0–8)
ERYTHROCYTE [DISTWIDTH] IN BLOOD BY AUTOMATED COUNT: 12.4 % (ref 11.5–14.5)
EST. GFR  (NO RACE VARIABLE): >60 ML/MIN/1.73 M^2
GLUCOSE SERPL-MCNC: 78 MG/DL (ref 70–110)
GLUCOSE SERPL-MCNC: 94 MG/DL (ref 70–110)
GLUCOSE UR QL STRIP: NEGATIVE
HCT VFR BLD AUTO: 43.5 % (ref 40–54)
HCT VFR BLD CALC: 43 %PCV (ref 36–54)
HGB BLD-MCNC: 14.3 G/DL (ref 14–18)
HGB UR QL STRIP: NEGATIVE
IMM GRANULOCYTES # BLD AUTO: 0.01 K/UL (ref 0–0.04)
IMM GRANULOCYTES NFR BLD AUTO: 0.2 % (ref 0–0.5)
KETONES UR QL STRIP: NEGATIVE
LEUKOCYTE ESTERASE UR QL STRIP: NEGATIVE
LIPASE SERPL-CCNC: 22 U/L (ref 4–60)
LYMPHOCYTES # BLD AUTO: 1.7 K/UL (ref 1–4.8)
LYMPHOCYTES NFR BLD: 25.5 % (ref 18–48)
MCH RBC QN AUTO: 28.8 PG (ref 27–31)
MCHC RBC AUTO-ENTMCNC: 32.9 G/DL (ref 32–36)
MCV RBC AUTO: 88 FL (ref 82–98)
MONOCYTES # BLD AUTO: 0.3 K/UL (ref 0.3–1)
MONOCYTES NFR BLD: 4.9 % (ref 4–15)
NEUTROPHILS # BLD AUTO: 4.3 K/UL (ref 1.8–7.7)
NEUTROPHILS NFR BLD: 66.6 % (ref 38–73)
NITRITE UR QL STRIP: NEGATIVE
NRBC BLD-RTO: 0 /100 WBC
PH UR STRIP: 6 [PH] (ref 5–8)
PLATELET # BLD AUTO: 247 K/UL (ref 150–450)
PMV BLD AUTO: 10 FL (ref 9.2–12.9)
POC IONIZED CALCIUM: 1.19 MMOL/L (ref 1.06–1.42)
POC TCO2 (MEASURED): 27 MMOL/L (ref 23–29)
POTASSIUM BLD-SCNC: 3.9 MMOL/L (ref 3.5–5.1)
POTASSIUM SERPL-SCNC: 3.8 MMOL/L (ref 3.5–5.1)
PROT SERPL-MCNC: 8.2 G/DL (ref 6–8.4)
PROT UR QL STRIP: NEGATIVE
RBC # BLD AUTO: 4.96 M/UL (ref 4.6–6.2)
SAMPLE: NORMAL
SODIUM BLD-SCNC: 139 MMOL/L (ref 136–145)
SODIUM SERPL-SCNC: 135 MMOL/L (ref 136–145)
SP GR UR STRIP: 1.02 (ref 1–1.03)
URN SPEC COLLECT METH UR: NORMAL
WBC # BLD AUTO: 6.51 K/UL (ref 3.9–12.7)

## 2025-01-20 PROCEDURE — 80053 COMPREHEN METABOLIC PANEL: CPT | Performed by: EMERGENCY MEDICINE

## 2025-01-20 PROCEDURE — 99285 EMERGENCY DEPT VISIT HI MDM: CPT | Mod: 25

## 2025-01-20 PROCEDURE — 25000003 PHARM REV CODE 250: Performed by: EMERGENCY MEDICINE

## 2025-01-20 PROCEDURE — 83690 ASSAY OF LIPASE: CPT | Performed by: EMERGENCY MEDICINE

## 2025-01-20 PROCEDURE — 81003 URINALYSIS AUTO W/O SCOPE: CPT | Performed by: EMERGENCY MEDICINE

## 2025-01-20 PROCEDURE — 96360 HYDRATION IV INFUSION INIT: CPT

## 2025-01-20 PROCEDURE — 85025 COMPLETE CBC W/AUTO DIFF WBC: CPT | Performed by: EMERGENCY MEDICINE

## 2025-01-20 PROCEDURE — 25500020 PHARM REV CODE 255: Performed by: EMERGENCY MEDICINE

## 2025-01-20 RX ORDER — AMOXICILLIN AND CLAVULANATE POTASSIUM 875; 125 MG/1; MG/1
1 TABLET, FILM COATED ORAL 2 TIMES DAILY
Qty: 14 TABLET | Refills: 0 | Status: SHIPPED | OUTPATIENT
Start: 2025-01-20 | End: 2025-01-27 | Stop reason: SINTOL

## 2025-01-20 RX ADMIN — SODIUM CHLORIDE 1000 ML: 9 INJECTION, SOLUTION INTRAVENOUS at 02:01

## 2025-01-20 RX ADMIN — IOHEXOL 75 ML: 350 INJECTION, SOLUTION INTRAVENOUS at 01:01

## 2025-01-20 NOTE — CONSULTS
[facility] - Emergency Dept  General Surgery  Consult Note    Patient Name: Phoenix Jones  MRN: [id number]  Code Status: Prior  Admission Date: 1/20/2025  Hospital Length of Stay: 0 days  Attending Physician: No att. providers found  Primary Care Provider: Arpan Bowens MD    Patient information was obtained from patient and ER records.     Inpatient consult to General surgery  Consult performed by: Refugio Aburto MD  Consult ordered by: Ed Au MD        Subjective:     Principal Problem: Abdominal pain    History of Present Illness: 33 yo M presenting to the ED with 2 days of acute lower abdominal pain. The onset was gradual. He describes the pain as discomfort in his pelvis. The pain has not changed location since onset. The pain has improved since onset but is till present. He denies fevers or chills. He has been having bowel function.    This entire patient visit was conducted with Mandarin  #[id number]    No current facility-administered medications on file prior to encounter.     Current Outpatient Medications on File Prior to Encounter   Medication Sig    ergocalciferol (ERGOCALCIFEROL) 50,000 unit Cap Take 1 capsule (50,000 Units total) by mouth every 7 days.    omega 3-dha-epa-fish oil 1,200 (144-216) mg Cap Take 2 capsules by mouth once daily.    vitamin E 400 UNIT capsule Take 1 capsule (400 Units total) by mouth once daily.    fluticasone propionate (FLONASE) 50 mcg/actuation nasal spray 1 spray (50 mcg total) by Each Nostril route once daily.    ZOLMitriptan (ZOMIG) 2.5 mg Spry 1 spray by Nasal route every 12 (twelve) hours as needed (Migraine).       Review of patient's allergies indicates:  No Known Allergies    Past Medical History:   Diagnosis Date    Bicuspid aortic valve 04/02/2024 2-2021  The left ventricle is normal in size with normal systolic function. The estimated ejection fraction is 60%  Normal right ventricular size with normal right ventricular systolic function.  Normal  left ventricular diastolic function.  Bicuspid aortic valve with normal leaflet mobility and mild aortic regurgitation.  The estimated PA systolic pressure is 39 mmHg.  Normal central venous pressu    Chronic allergic rhinitis due to pollen 04/02/2024    Chronic superficial gastritis without bleeding 08/22/2023    EGD 5-  - Normal esophagus.    - Gastritis. Biopsied.   - Duodenal erosions without bleeding. Biopsied.   The biopsies show some chronic inflammation of the stomach, which is likely from the Hpylori that was treated IN THE PAST. There is NO evidence of ongoing Hpylori infection (no ACTIVE infection, this is both by biopsy as well as the negative stool test months ago)      H. pylori infection 04/29/2024    Re treatment 4-2024 Quadruple therapy      Hepatic steatosis 08/22/2023    3-2021 US - mild hepatic steatosis    History of Helicobacter pylori infection     Treated    KASH (obstructive sleep apnea) 03/11/2021    Vitamin D deficiency 12/06/2020     Past Surgical History:   Procedure Laterality Date    ESOPHAGOGASTRODUODENOSCOPY  05/12/2021    ESOPHAGOGASTRODUODENOSCOPY N/A 5/12/2021    Procedure: EGD (ESOPHAGOGASTRODUODENOSCOPY) Covid test 5/9/21;  Surgeon: Emerson Fontaine MD;  Location: [facility];  Service: Endoscopy;  Laterality: N/A;  with G / D biopsies     Family History       Problem Relation (Age of Onset)    No Known Problems Mother, Father, Sister    Thyroid disease Daughter          Tobacco Use    Smoking status: Former    Smokeless tobacco: Never   Substance and Sexual Activity    Alcohol use: No    Drug use: No    Sexual activity: Yes     Partners: Female     Review of Systems   Constitutional:  Negative for appetite change, chills, fever and unexpected weight change.   HENT:  Negative for sore throat and trouble swallowing.    Respiratory:  Negative for chest tightness and shortness of breath.    Cardiovascular:  Negative for chest pain and leg swelling.   Gastrointestinal:  Positive  for abdominal pain and constipation. Negative for abdominal distention, blood in stool, diarrhea, nausea and vomiting.   Skin:  Negative for pallor.   All other systems reviewed and are negative.    Objective:     Vital Signs (Most Recent):  Temp: 98.3 °F (36.8 °C) (01/20/25 1459)  Pulse: 74 (01/20/25 1459)  Resp: 14 (01/20/25 1459)  BP: 114/62 (01/20/25 1459)  SpO2: 97 % (01/20/25 1459) Vital Signs (24h Range):  Temp:  [98.3 °F (36.8 °C)-98.5 °F (36.9 °C)] 98.3 °F (36.8 °C)  Pulse:  [74-82] 74  Resp:  [14-18] 14  SpO2:  [96 %-98 %] 97 %  BP: (114-125)/(62-66) 114/62     Weight: 70.3 kg (155 lb)  Body mass index is 27.46 kg/m².     Physical Exam  Vitals reviewed.   Constitutional:       Appearance: Normal appearance.   HENT:      Head: Normocephalic.   Eyes:      Extraocular Movements: Extraocular movements intact.      Conjunctiva/sclera: Conjunctivae normal.   Cardiovascular:      Rate and Rhythm: Normal rate and regular rhythm.   Pulmonary:      Effort: Pulmonary effort is normal. No respiratory distress.   Abdominal:      Comments: Soft, nondistended, and nontender throughout   Musculoskeletal:         General: No signs of injury.      Cervical back: Normal range of motion and neck supple.      Right lower leg: No edema.      Left lower leg: No edema.   Skin:     General: Skin is warm and dry.   Neurological:      General: No focal deficit present.      Mental Status: He is alert and oriented to person, place, and time.            I have reviewed all pertinent lab results within the past 24 hours.  CBC:   Recent Labs   Lab 01/20/25  1155 01/20/25  1200   WBC 6.51  --    RBC 4.96  --    HGB 14.3  --    HCT 43.5 43     --    MCV 88  --    MCH 28.8  --    MCHC 32.9  --        Significant Diagnostics:  I have reviewed all pertinent imaging results/findings within the past 24 hours.  CT reviewed. Mildly dilated appendix with possible small segment of inflammatory changes.    Assessment/Plan:     * Abdominal  pain  33 yo M presenting with 2 days of lower abdominal pain. His imaging shows a mildly dilated appendix. However, his clinical picture is not consistent with appendicitis. His pain is non-migratory, mild, and has improved since onset. His labs are without a leukocytosis or left shift. His discomfort could be explained by his impressive stool burden. Overall, our suspicion for acute appendicitis is low, but with the potential inflammatory changes seen on CT and the impending winter storm which could prevent him re-presenting should he develop appendicitis, we will plan to medically treat him with a course of augmentin.    -Recommend 10 day course of Augmentin. I discussed this in detail with the patient.  -OK for discharge home. Return precautions were discussed in depth.  -Recommended he start a daily bowel regimen at home  -We will follow up with him in ~2 weeks in clinic with a repeat CT scan      VTE Risk Mitigation (From admission, onward)      None            Thank you for your consult. I will sign off. Please contact us if you have any additional questions.    Refugio Aburto MD  General Surgery  [facility] - Emergency Dept

## 2025-01-20 NOTE — HPI
33 yo M presenting to the ED with 2 days of acute lower abdominal pain. The onset was gradual. He describes the pain as discomfort in his pelvis. The pain has not changed location since onset. The pain has improved since onset but is till present. He denies fevers or chills. He has been having bowel function.

## 2025-01-20 NOTE — ED TRIAGE NOTES
To the ED with concerns of abdominal RLQ pain when he touches it and also abdominal pain for 2 days. Also reporting anal discomfort and sometimes bleeding.

## 2025-01-20 NOTE — SUBJECTIVE & OBJECTIVE
No current facility-administered medications on file prior to encounter.     Current Outpatient Medications on File Prior to Encounter   Medication Sig    ergocalciferol (ERGOCALCIFEROL) 50,000 unit Cap Take 1 capsule (50,000 Units total) by mouth every 7 days.    omega 3-dha-epa-fish oil 1,200 (144-216) mg Cap Take 2 capsules by mouth once daily.    vitamin E 400 UNIT capsule Take 1 capsule (400 Units total) by mouth once daily.    fluticasone propionate (FLONASE) 50 mcg/actuation nasal spray 1 spray (50 mcg total) by Each Nostril route once daily.    ZOLMitriptan (ZOMIG) 2.5 mg Spry 1 spray by Nasal route every 12 (twelve) hours as needed (Migraine).       Review of patient's allergies indicates:  No Known Allergies    Past Medical History:   Diagnosis Date    Bicuspid aortic valve 04/02/2024 2-2021  The left ventricle is normal in size with normal systolic function. The estimated ejection fraction is 60%  Normal right ventricular size with normal right ventricular systolic function.  Normal left ventricular diastolic function.  Bicuspid aortic valve with normal leaflet mobility and mild aortic regurgitation.  The estimated PA systolic pressure is 39 mmHg.  Normal central venous pressu    Chronic allergic rhinitis due to pollen 04/02/2024    Chronic superficial gastritis without bleeding 08/22/2023    EGD 5-  - Normal esophagus.    - Gastritis. Biopsied.   - Duodenal erosions without bleeding. Biopsied.   The biopsies show some chronic inflammation of the stomach, which is likely from the Hpylori that was treated IN THE PAST. There is NO evidence of ongoing Hpylori infection (no ACTIVE infection, this is both by biopsy as well as the negative stool test months ago)      H. pylori infection 04/29/2024    Re treatment 4-2024 Quadruple therapy      Hepatic steatosis 08/22/2023    3-2021 US - mild hepatic steatosis    History of Helicobacter pylori infection     Treated    KASH (obstructive sleep apnea)  03/11/2021    Vitamin D deficiency 12/06/2020     Past Surgical History:   Procedure Laterality Date    ESOPHAGOGASTRODUODENOSCOPY  05/12/2021    ESOPHAGOGASTRODUODENOSCOPY N/A 5/12/2021    Procedure: EGD (ESOPHAGOGASTRODUODENOSCOPY) Covid test 5/9/21;  Surgeon: Emerson Fontaine MD;  Location: [facility];  Service: Endoscopy;  Laterality: N/A;  with G / D biopsies     Family History       Problem Relation (Age of Onset)    No Known Problems Mother, Father, Sister    Thyroid disease Daughter          Tobacco Use    Smoking status: Former    Smokeless tobacco: Never   Substance and Sexual Activity    Alcohol use: No    Drug use: No    Sexual activity: Yes     Partners: Female     Review of Systems   Constitutional:  Negative for appetite change, chills, fever and unexpected weight change.   HENT:  Negative for sore throat and trouble swallowing.    Respiratory:  Negative for chest tightness and shortness of breath.    Cardiovascular:  Negative for chest pain and leg swelling.   Gastrointestinal:  Positive for abdominal pain and constipation. Negative for abdominal distention, blood in stool, diarrhea, nausea and vomiting.   Skin:  Negative for pallor.   All other systems reviewed and are negative.    Objective:     Vital Signs (Most Recent):  Temp: 98.3 °F (36.8 °C) (01/20/25 1459)  Pulse: 74 (01/20/25 1459)  Resp: 14 (01/20/25 1459)  BP: 114/62 (01/20/25 1459)  SpO2: 97 % (01/20/25 1459) Vital Signs (24h Range):  Temp:  [98.3 °F (36.8 °C)-98.5 °F (36.9 °C)] 98.3 °F (36.8 °C)  Pulse:  [74-82] 74  Resp:  [14-18] 14  SpO2:  [96 %-98 %] 97 %  BP: (114-125)/(62-66) 114/62     Weight: 70.3 kg (155 lb)  Body mass index is 27.46 kg/m².     Physical Exam  Vitals reviewed.   Constitutional:       Appearance: Normal appearance.   HENT:      Head: Normocephalic.   Eyes:      Extraocular Movements: Extraocular movements intact.      Conjunctiva/sclera: Conjunctivae normal.   Cardiovascular:      Rate and Rhythm: Normal rate and  regular rhythm.   Pulmonary:      Effort: Pulmonary effort is normal. No respiratory distress.   Abdominal:      Comments: Soft, nondistended, and nontender throughout   Musculoskeletal:         General: No signs of injury.      Cervical back: Normal range of motion and neck supple.      Right lower leg: No edema.      Left lower leg: No edema.   Skin:     General: Skin is warm and dry.   Neurological:      General: No focal deficit present.      Mental Status: He is alert and oriented to person, place, and time.            I have reviewed all pertinent lab results within the past 24 hours.  CBC:   Recent Labs   Lab 01/20/25  1155 01/20/25  1200   WBC 6.51  --    RBC 4.96  --    HGB 14.3  --    HCT 43.5 43     --    MCV 88  --    MCH 28.8  --    MCHC 32.9  --        Significant Diagnostics:  I have reviewed all pertinent imaging results/findings within the past 24 hours.  CT reviewed. Mildly dilated appendix with possible small segment of inflammatory changes.

## 2025-01-20 NOTE — PROGRESS NOTES
INTERNAL MEDICINE CLINIC  E-Visit Note          PCP: Arpan Bowens MD    MEDICATIONS & ALLERGIES:     Current Outpatient Medications on File Prior to Visit   Medication Sig Dispense Refill    ergocalciferol (ERGOCALCIFEROL) 50,000 unit Cap Take 1 capsule (50,000 Units total) by mouth every 7 days. 12 capsule 3    fluticasone propionate (FLONASE) 50 mcg/actuation nasal spray 1 spray (50 mcg total) by Each Nostril route once daily. 16 g 11    omega 3-dha-epa-fish oil 1,200 (144-216) mg Cap Take 2 capsules by mouth once daily.      vitamin E 400 UNIT capsule Take 1 capsule (400 Units total) by mouth once daily.      ZOLMitriptan (ZOMIG) 2.5 mg Spry 1 spray by Nasal route every 12 (twelve) hours as needed (Migraine). 6 each 11     No current facility-administered medications on file prior to visit.        Review of patient's allergies indicates:  No Known Allergies      ASSESSMENT & PLAN     Patient ID: Phoenix Jones is a 34 y.o. male.    Chief Complaint: General Illness (Entered automatically based on patient selection in [employer].)    The patient initiated a request through [employer] on 1/19/2025 for evaluation and management with a chief complaint of General Illness (Entered automatically based on patient selection in [employer].)     I evaluated the questionnaire submission on 1-    [facility]-Common Problems    1/19/2025 10:08 PM CST - Filed by Patient   What do you need help with? Other Concern   Do you agree to participate in an E-Visit? Yes   If you have any of the following symptoms, please present to your local emergency room or call 911:  I acknowledge   What is the main issue you would like addressed today? Bottom right abdominal pain   Please describe your symptoms Bottom right abdominal pain   Where is your problem located? Bottom right abdominal   How severe are your symptoms? Moderate   Have you had these symptoms before? No   How long have you been having these symptoms? For a few days    Please list any medications or treatments you have used for your condition and indicate if it was effective or not. No medications   What makes this feel better? no   What makes this feel worse? When I press the abdominal, the pain is worst.   Are these symptoms related to a condition that you currently have? No   Please describe any probable cause for these symptoms I'm not sure why, maybe I stay up late and bad diet.   Provide any additional information you feel is important.    Please attach any relevant images or files    Are you able to take your vital signs? No         Encounter Diagnosis   Name Primary?    Right lower quadrant pain Yes      Patient has been instructed to go to ED to evaluate for right lower abdominal pain, specifically to rule out appendicitis, colitis and kidney stone.    E-Visit Time Trackin min.    Scheduled Follow-up :  Future Appointments   Date Time Provider Department Center   2025  3:30 PM Arpan Bowens MD [facility]   2025  2:00 PM [facility] OIC-US1 MASTER [facility]       After Visit Medication List :     Medication List            Accurate as of 2025 11:59 PM. If you have any questions, ask your nurse or doctor.                CONTINUE taking these medications      ergocalciferol 50,000 unit Cap  Commonly known as: ERGOCALCIFEROL  Take 1 capsule (50,000 Units total) by mouth every 7 days.     fluticasone propionate 50 mcg/actuation nasal spray  Commonly known as: FLONASE  1 spray (50 mcg total) by Each Nostril route once daily.     omega 3-dha-epa-fish oil 1,200 (144-216) mg Cap  Take 2 capsules by mouth once daily.     vitamin E 400 UNIT capsule  Take 1 capsule (400 Units total) by mouth once daily.     ZOLMitriptan 2.5 mg Spry  Commonly known as: ZOMIG  1 spray by Nasal route every 12 (twelve) hours as needed (Migraine).              Signing Physician:  Arpan Bowens MD

## 2025-01-20 NOTE — ED PROVIDER NOTES
Encounter Date: 1/20/2025       History     Chief Complaint   Patient presents with    Abdominal Pain     R lower abd     34-year-old male presents with 2 days of abdominal pain.  He states it is periumbilical and sometimes and lower abdomen.  Feels like he is having difficulty digesting things.  No fever vomiting.  Sometimes his bowel movements are different.  He has noticed some anal pain at times and yesterday had a small amount of blood around the stool.  The blood does not continue.  No prior operations.  No testicular pain.        Review of patient's allergies indicates:  No Known Allergies  Past Medical History:   Diagnosis Date    Bicuspid aortic valve 04/02/2024 2-2021  The left ventricle is normal in size with normal systolic function. The estimated ejection fraction is 60%  Normal right ventricular size with normal right ventricular systolic function.  Normal left ventricular diastolic function.  Bicuspid aortic valve with normal leaflet mobility and mild aortic regurgitation.  The estimated PA systolic pressure is 39 mmHg.  Normal central venous pressu    Chronic allergic rhinitis due to pollen 04/02/2024    Chronic superficial gastritis without bleeding 08/22/2023    EGD 5-  - Normal esophagus.    - Gastritis. Biopsied.   - Duodenal erosions without bleeding. Biopsied.   The biopsies show some chronic inflammation of the stomach, which is likely from the Hpylori that was treated IN THE PAST. There is NO evidence of ongoing Hpylori infection (no ACTIVE infection, this is both by biopsy as well as the negative stool test months ago)      H. pylori infection 04/29/2024    Re treatment 4-2024 Quadruple therapy      Hepatic steatosis 08/22/2023    3-2021 US - mild hepatic steatosis    History of Helicobacter pylori infection     Treated    KASH (obstructive sleep apnea) 03/11/2021    Vitamin D deficiency 12/06/2020     Past Surgical History:   Procedure Laterality Date    ESOPHAGOGASTRODUODENOSCOPY   05/12/2021    ESOPHAGOGASTRODUODENOSCOPY N/A 5/12/2021    Procedure: EGD (ESOPHAGOGASTRODUODENOSCOPY) Covid test 5/9/21;  Surgeon: Emerson Fontaine MD;  Location: [facility];  Service: Endoscopy;  Laterality: N/A;  with G / D biopsies     Family History   Problem Relation Name Age of Onset    No Known Problems Mother      No Known Problems Father      No Known Problems Sister      Thyroid disease Daughter       Social History     Tobacco Use    Smoking status: Former    Smokeless tobacco: Never   Substance Use Topics    Alcohol use: No    Drug use: No     Review of Systems    Physical Exam     Initial Vitals [01/20/25 1102]   BP Pulse Resp Temp SpO2   125/64 82 18 98.5 °F (36.9 °C) 96 %      MAP       --         Physical Exam    Constitutional: He appears well-developed and well-nourished. No distress.   HENT:   Head: Normocephalic and atraumatic. Mouth/Throat: Oropharynx is clear and moist.   Eyes: Conjunctivae and EOM are normal. Pupils are equal, round, and reactive to light. Right eye exhibits no discharge. Left eye exhibits no discharge. No scleral icterus.   Neck: Neck supple. No JVD present.   Normal range of motion.  Cardiovascular:  Normal rate, regular rhythm, normal heart sounds and intact distal pulses.     Exam reveals no friction rub.       No murmur heard.  Pulmonary/Chest: Breath sounds normal. No stridor. No respiratory distress. He has no wheezes. He has no rhonchi. He has no rales.   Abdominal: Abdomen is soft. Bowel sounds are normal. He exhibits no distension and no mass. There is abdominal tenderness (mild right mid abdomen). There is no rebound and no guarding.   Genitourinary:    Genitourinary Comments: Small anal fissure     Musculoskeletal:         General: No tenderness or edema. Normal range of motion.      Cervical back: Normal range of motion and neck supple.     Lymphadenopathy:     He has no cervical adenopathy.   Neurological: He is alert. He has normal strength. No cranial nerve  deficit or sensory deficit. GCS score is 15. GCS eye subscore is 4. GCS verbal subscore is 5. GCS motor subscore is 6.   Skin: Skin is warm. Capillary refill takes less than 2 seconds. No rash noted.   Psychiatric: He has a normal mood and affect.         ED Course   Procedures  Labs Reviewed   COMPREHENSIVE METABOLIC PANEL - Abnormal       Result Value    Sodium 135 (*)     Potassium 3.8      Chloride 102      CO2 25      Glucose 78      BUN 14      Creatinine 0.7      Calcium 9.3      Total Protein 8.2      Albumin 4.2      Total Bilirubin 0.6      Alkaline Phosphatase 61      AST 15      ALT 19      eGFR >60.0      Anion Gap 8     CBC W/ AUTO DIFFERENTIAL    WBC 6.51      RBC 4.96      Hemoglobin 14.3      Hematocrit 43.5      MCV 88      MCH 28.8      MCHC 32.9      RDW 12.4      Platelets 247      MPV 10.0      Immature Granulocytes 0.2      Gran # (ANC) 4.3      Immature Grans (Abs) 0.01      Lymph # 1.7      Mono # 0.3      Eos # 0.2      Baso # 0.02      nRBC 0      Gran % 66.6      Lymph % 25.5      Mono % 4.9      Eosinophil % 2.5      Basophil % 0.3      Differential Method Automated     LIPASE    Lipase 22     URINALYSIS, REFLEX TO URINE CULTURE    Specimen UA Urine, Clean Catch      Color, UA Yellow      Appearance, UA Clear      pH, UA 6.0      Specific Gravity, UA 1.020      Protein, UA Negative      Glucose, UA Negative      Ketones, UA Negative      Bilirubin (UA) Negative      Occult Blood UA Negative      Nitrite, UA Negative      Leukocytes, UA Negative      Narrative:     Specimen Source->Urine   ISTAT PROCEDURE    POC Glucose 94      POC BUN 15      POC Creatinine 0.8      POC Sodium 139      POC Potassium 3.9      POC Chloride 101      POC TCO2 (MEASURED) 27      POC Ionized Calcium 1.19      POC Hematocrit 43      Sample CEDRIC     ISTAT CHEM8          Imaging Results               CT Abdomen Pelvis With IV Contrast NO Oral Contrast (Final result)  Result time 01/20/25 13:27:22      Final result  by Dusty Holloway MD (01/20/25 13:27:22)                   Impression:      This report was flagged in Epic as abnormal.    1. Findings most consistent with acute appendicitis.  No abscess or free air at this time.  2. Large amount of stool in the rectum suggests constipation.  3. Please see above for several additional findings.      Electronically signed by: Dusty Holloway MD  Date:    01/20/2025  Time:    13:27               Narrative:    EXAMINATION:  CT ABDOMEN PELVIS WITH IV CONTRAST    CLINICAL HISTORY:  RLQ abdominal pain (Age >= 14y);    TECHNIQUE:  Low dose axial images, sagittal and coronal reformations were obtained from the lung bases to the pubic symphysis following the IV administration of 75 mL of Omnipaque 350 .  Oral contrast was not given.    COMPARISON:  Ultrasound 03/16/2021    FINDINGS:  Images of the lower thorax are unremarkable.    The liver, spleen, pancreas, gallbladder and adrenal glands are grossly unremarkable.  There is no biliary dilation or ascites.  The portal vein, splenic vein, SMV, celiac axis and SMA all are patent.  No significant abdominal lymphadenopathy.    The kidneys enhance symmetrically without hydronephrosis or nephrolithiasis.  The bilateral ureters are unremarkable without calculi seen.  The urinary bladder is unremarkable.  The prostate is not enlarged.    There is moderate to large amount of stool in the rectum.  There are a few scattered colonic diverticula without surrounding inflammation to suggest diverticulitis.  The terminal ileum is unremarkable.  The appendix is distended measuring up to 1 cm in diameter noting slight inflammation about the mid aspect of the appendix.  No focal organized collection.  No free air.  The small bowel is grossly unremarkable.  There are a few scattered shotty periaortic and paracaval lymph nodes.  There are a few scattered prominent pericecal lymph nodes.  No focal organized pelvic fluid collection.    No acute osseous  abnormalities.  No significant inguinal lymphadenopathy.                                       Medications   sodium chloride 0.9% bolus 1,000 mL 1,000 mL (0 mLs Intravenous Stopped 1/20/25 1501)   iohexoL (OMNIPAQUE 350) injection 75 mL (75 mLs Intravenous Given 1/20/25 1319)     Medical Decision Making  Patient with abdominal pain, right lower abdominal pain, benign abdominal exam with mild tenderness.  Will check labs and CT scan abdomen pelvis.  He also has a small anal fissure.    I reviewed CT images.  Dilated appendix.  This was confirmed by Radiology.  Consult discussed with General surgery.    General surgery saw patient.  Felt low likelihood for appendicitis.  They recommend sending him out on Augmentin as antibiotic.  Follow up with General surgery clinic in 2 weeks.  They will arrange for this as well as a CT scan as an outpatient.  He will return here if worse pain fever or vomiting    Amount and/or Complexity of Data Reviewed  Labs: ordered.  Radiology: ordered.    Risk  Prescription drug management.                                      Clinical Impression:  Final diagnoses:  [R10.31] Right lower quadrant abdominal pain (Primary)          ED Disposition Condition    Discharge Stable          ED Prescriptions       Medication Sig Dispense Start Date End Date Auth. Provider    amoxicillin-clavulanate 875-125mg (AUGMENTIN) 875-125 mg per tablet Take 1 tablet by mouth 2 (two) times daily. 14 tablet 1/20/2025 -- Ed Au MD          Follow-up Information       Follow up With Specialties Details Why Contact Info    Arpan Bowens MD Internal Medicine, Hospitalist   [address]  [place] [address]  [phone]      [facility] - Emergency Dept Emergency Medicine  If symptoms worsen, worse pain, fever, vomiting [address] [facility] [id number] [phone]             Ed Au MD  01/20/25 2723

## 2025-01-20 NOTE — ASSESSMENT & PLAN NOTE
35 yo M presenting with 2 days of lower abdominal pain. His imaging shows a mildly dilated appendix. However, his clinical picture is not consistent with appendicitis. His pain is non-migratory, mild, and has improved since onset. His labs are without a leukocytosis or left shift. His discomfort could be explained by his impressive stool burden. Overall, our suspicion for acute appendicitis is low, but with the potential inflammatory changes seen on CT and the impending winter storm which could prevent him re-presenting should he develop appendicitis, we will plan to medically treat him with a course of augmentin.    -Recommend 10 day course of Augmentin. I discussed this in detail with the patient.  -OK for discharge home. Return precautions were discussed in depth.  -Recommended he start a daily bowel regimen at home  -We will follow up with him in ~2 weeks in clinic with a repeat CT scan

## 2025-01-21 DIAGNOSIS — R10.9 UNSPECIFIED ABDOMINAL PAIN: Primary | ICD-10-CM

## 2025-01-24 ENCOUNTER — HOSPITAL ENCOUNTER (OUTPATIENT)
Dept: RADIOLOGY | Facility: HOSPITAL | Age: 35
Discharge: HOME | End: 2025-01-24
Attending: INTERNAL MEDICINE
Payer: COMMERCIAL

## 2025-01-24 DIAGNOSIS — R22.1 NECK MASS: ICD-10-CM

## 2025-01-24 PROCEDURE — 76536 US EXAM OF HEAD AND NECK: CPT | Mod: 26,,, | Performed by: RADIOLOGY

## 2025-01-24 PROCEDURE — 76536 US EXAM OF HEAD AND NECK: CPT | Mod: TC

## 2025-01-27 ENCOUNTER — TELEPHONE (OUTPATIENT)
Dept: ENDOSCOPY | Facility: HOSPITAL | Age: 35
End: 2025-01-27
Payer: COMMERCIAL

## 2025-01-27 ENCOUNTER — OFFICE VISIT (OUTPATIENT)
Dept: GASTROENTEROLOGY | Facility: CLINIC | Age: 35
End: 2025-01-27
Payer: COMMERCIAL

## 2025-01-27 ENCOUNTER — OFFICE VISIT (OUTPATIENT)
Dept: SURGERY | Facility: CLINIC | Age: 35
End: 2025-01-27
Payer: COMMERCIAL

## 2025-01-27 ENCOUNTER — LAB VISIT (OUTPATIENT)
Dept: LAB | Facility: HOSPITAL | Age: 35
End: 2025-01-27
Payer: COMMERCIAL

## 2025-01-27 ENCOUNTER — TELEPHONE (OUTPATIENT)
Dept: SURGERY | Facility: CLINIC | Age: 35
End: 2025-01-27
Payer: COMMERCIAL

## 2025-01-27 VITALS
HEIGHT: 63 IN | BODY MASS INDEX: 27.27 KG/M2 | HEART RATE: 91 BPM | WEIGHT: 153.88 LBS | SYSTOLIC BLOOD PRESSURE: 127 MMHG | DIASTOLIC BLOOD PRESSURE: 72 MMHG

## 2025-01-27 DIAGNOSIS — K29.50 CHRONIC HELICOBACTER PYLORI GASTRITIS: ICD-10-CM

## 2025-01-27 DIAGNOSIS — Z12.11 COLON CANCER SCREENING: ICD-10-CM

## 2025-01-27 DIAGNOSIS — R10.9 ABDOMINAL PAIN, UNSPECIFIED ABDOMINAL LOCATION: ICD-10-CM

## 2025-01-27 DIAGNOSIS — R93.3 ABNORMAL FINDING ON GI TRACT IMAGING: Primary | ICD-10-CM

## 2025-01-27 DIAGNOSIS — R10.13 EPIGASTRIC ABDOMINAL PAIN: ICD-10-CM

## 2025-01-27 DIAGNOSIS — B96.81 CHRONIC HELICOBACTER PYLORI GASTRITIS: ICD-10-CM

## 2025-01-27 DIAGNOSIS — R10.9 ABDOMINAL PAIN, UNSPECIFIED ABDOMINAL LOCATION: Primary | ICD-10-CM

## 2025-01-27 DIAGNOSIS — K59.00 CONSTIPATION, UNSPECIFIED CONSTIPATION TYPE: ICD-10-CM

## 2025-01-27 DIAGNOSIS — K57.92 DIVERTICULITIS: ICD-10-CM

## 2025-01-27 DIAGNOSIS — R10.13 EPIGASTRIC ABDOMINAL PAIN: Primary | ICD-10-CM

## 2025-01-27 DIAGNOSIS — R10.31 RIGHT LOWER QUADRANT ABDOMINAL PAIN: ICD-10-CM

## 2025-01-27 PROCEDURE — 99214 OFFICE O/P EST MOD 30 MIN: CPT | Mod: S$GLB,,,

## 2025-01-27 PROCEDURE — 86677 HELICOBACTER PYLORI ANTIBODY: CPT

## 2025-01-27 PROCEDURE — 99999 PR PBB SHADOW E&M-EST. PATIENT-LVL IV: CPT | Mod: PBBFAC,,,

## 2025-01-27 PROCEDURE — 3008F BODY MASS INDEX DOCD: CPT | Mod: CPTII,S$GLB,,

## 2025-01-27 PROCEDURE — 36415 COLL VENOUS BLD VENIPUNCTURE: CPT

## 2025-01-27 PROCEDURE — 3074F SYST BP LT 130 MM HG: CPT | Mod: CPTII,S$GLB,,

## 2025-01-27 PROCEDURE — 99213 OFFICE O/P EST LOW 20 MIN: CPT | Mod: S$GLB,,, | Performed by: SURGERY

## 2025-01-27 PROCEDURE — 1159F MED LIST DOCD IN RCRD: CPT | Mod: CPTII,S$GLB,,

## 2025-01-27 PROCEDURE — 1160F RVW MEDS BY RX/DR IN RCRD: CPT | Mod: CPTII,S$GLB,,

## 2025-01-27 PROCEDURE — 3078F DIAST BP <80 MM HG: CPT | Mod: CPTII,S$GLB,,

## 2025-01-27 PROCEDURE — 99999 PR PBB SHADOW E&M-EST. PATIENT-LVL I: CPT | Mod: PBBFAC,,, | Performed by: SURGERY

## 2025-01-27 RX ORDER — SODIUM, POTASSIUM,MAG SULFATES 17.5-3.13G
1 SOLUTION, RECONSTITUTED, ORAL ORAL DAILY
Qty: 1 KIT | Refills: 0 | Status: SHIPPED | OUTPATIENT
Start: 2025-01-27 | End: 2025-01-29

## 2025-01-27 RX ORDER — PANTOPRAZOLE SODIUM 40 MG/1
40 TABLET, DELAYED RELEASE ORAL DAILY
Qty: 30 TABLET | Refills: 11 | Status: SHIPPED | OUTPATIENT
Start: 2025-01-27 | End: 2026-01-27

## 2025-01-27 NOTE — TELEPHONE ENCOUNTER
I call the language line for a Mandarin .  I explained that while the Patient was in the Hospital, he was set up with a follow up CT Scan and to see Dr. Palmer.  He has an appointment to see Dr. Hill today.  I was calling to explain that he does not need to be seen today with Dr. Hill.  The  tried and there was no answer.  The  left a phone message.       The Patient arrival.  The Patient's Girlfriend said that they wanted to come today because he has a rash.  The Patient will be seen by Acute General Surgery.

## 2025-01-27 NOTE — PROGRESS NOTES
Gastroenterology Clinic Consultation Note    Reason for Visit:  The primary encounter diagnosis was Epigastric abdominal pain. Diagnoses of Right lower quadrant abdominal pain, Chronic Helicobacter pylori gastritis, Constipation, unspecified constipation type, and Diverticulitis were also pertinent to this visit.    PCP:   Arpan Bowens   [id number] / [place] [id number]      Initial HPI   This is a 34 y.o. male PMH of H. Pylori, chronic gastritis, hepatic steatosis presenting for epigastric pain/RLQ pain   Patient in clinic with c/o epigastric pain. Seen in the ER  for same complaints. CT done in ED showed acute appendicitis and Large amount of stool in the rectum suggests constipation. General surgery saw patient. Felt low likelihood for appendicitis. They recommend sending him out on Augmentin as antibiotic. Follow up with General surgery clinic in 2 weeks. They will arrange for this as well as a CT scan as an outpatient. Feels like he is having difficulty digesting things.  No fever vomiting.  Sometimes his bowel movements are different.  He has noticed some anal pain at times and yesterday had a small amount of blood around the stool.  The blood does not continue.  No prior operations.  H pylori positive in  confirmed by biopsy. Denies previous colonoscopy.     Abdominal Pain  How Lon-2 weeks   Frequency: Intermittent wax and wanes    Location: Epigastric/RLQ    Quality:  RLQ sharp -  generalized discomfort    Radiate:  Stomach to back    Aggravated or Improved with bowel movement  /eating/ movement Push RLQ worse -  medication better    Medications tried:   Augmentin     Nausea/Vomiting/fever   No    Pyrosis/Reflux/Dysphagia: No    Bowel Movements:  constipated   Melena/Hematochezia:  Some BRB     Symptoms: no   GLP-1s:  no   NSAIDs:  no   Anticoagulation or Antiplatelet: No    History of H.pylori: Yes    Prior Colonoscopy: No    Prior Upper Endoscopy:     Family h/o Crohn's   "no   Ulcerative Colitis: no   Family h/o Celiac Sprue: no   Family h/o Colon Cancer:     no   Abdominal Surgeries: no           ROS:  ROS     Medical History:  has a past medical history of Bicuspid aortic valve (04/02/2024), Chronic allergic rhinitis due to pollen (04/02/2024), Chronic superficial gastritis without bleeding (08/22/2023), H. pylori infection (04/29/2024), Hepatic steatosis (08/22/2023), History of colon polyps (02/05/2025), History of Helicobacter pylori infection, KASH (obstructive sleep apnea) (03/11/2021), and Vitamin D deficiency (12/06/2020).    Surgical History:  has a past surgical history that includes Esophagogastroduodenoscopy (05/12/2021); Esophagogastroduodenoscopy (N/A, 5/12/2021); Esophagogastroduodenoscopy (N/A, 2/5/2025); and Colonoscopy (N/A, 2/5/2025).    Family History: family history includes No Known Problems in his father, mother, and sister; Thyroid disease in his daughter..       Review of patient's allergies indicates:   Allergen Reactions    Augmentin [amoxicillin-pot clavulanate] Rash    Iodinated contrast media Hives       Current Outpatient Medications on File Prior to Visit   Medication Sig Dispense Refill    ergocalciferol (ERGOCALCIFEROL) 50,000 unit Cap Take 1 capsule (50,000 Units total) by mouth every 7 days. 12 capsule 3    fluticasone propionate (FLONASE) 50 mcg/actuation nasal spray 1 spray (50 mcg total) by Each Nostril route once daily. (Patient not taking: Reported on 2/17/2025) 16 g 11    omega 3-dha-epa-fish oil 1,200 (144-216) mg Cap Take 2 capsules by mouth once daily.      vitamin E 400 UNIT capsule Take 1 capsule (400 Units total) by mouth once daily.      ZOLMitriptan (ZOMIG) 2.5 mg Spry 1 spray by Nasal route every 12 (twelve) hours as needed (Migraine). 6 each 11     No current facility-administered medications on file prior to visit.         Objective Findings:    Vital Signs:  /72   Pulse 91   Ht 5' 3" (1.6 m)   Wt 69.8 kg (153 lb 14.1 oz)  "  BMI 27.26 kg/m²   Body mass index is 27.26 kg/m².    Physical Exam:  Physical Exam        Labs:  Lab Results   Component Value Date    WBC 6.51 01/20/2025    HGB 14.3 01/20/2025    HCT 43 01/20/2025     01/20/2025    CHOL 206 (H) 04/02/2024    TRIG 161 (H) 04/02/2024    HDL 41 04/02/2024    ALKPHOS 61 01/20/2025    LIPASE 22 01/20/2025    ALT 19 01/20/2025    AST 15 01/20/2025     (L) 01/20/2025    K 3.8 01/20/2025     01/20/2025    CREATININE 0.7 02/18/2025    BUN 14 01/20/2025    CO2 25 01/20/2025    TSH 1.058 04/02/2024    INR 1.0 02/17/2019    HGBA1C 5.0 04/02/2024       Imaging reviewed:   CT ABDOMEN PELVIS WITH IV CONTRAST     CLINICAL HISTORY:  RLQ abdominal pain (Age >= 14y);     TECHNIQUE:  Low dose axial images, sagittal and coronal reformations were obtained from the lung bases to the pubic symphysis following the IV administration of 75 mL of Omnipaque 350 .  Oral contrast was not given.     COMPARISON:  Ultrasound 03/16/2021     FINDINGS:  Images of the lower thorax are unremarkable.     The liver, spleen, pancreas, gallbladder and adrenal glands are grossly unremarkable.  There is no biliary dilation or ascites.  The portal vein, splenic vein, SMV, celiac axis and SMA all are patent.  No significant abdominal lymphadenopathy.     The kidneys enhance symmetrically without hydronephrosis or nephrolithiasis.  The bilateral ureters are unremarkable without calculi seen.  The urinary bladder is unremarkable.  The prostate is not enlarged.     There is moderate to large amount of stool in the rectum.  There are a few scattered colonic diverticula without surrounding inflammation to suggest diverticulitis.  The terminal ileum is unremarkable.  The appendix is distended measuring up to 1 cm in diameter noting slight inflammation about the mid aspect of the appendix.  No focal organized collection.  No free air.  The small bowel is grossly unremarkable.  There are a few scattered shotty  periaortic and paracaval lymph nodes.  There are a few scattered prominent pericecal lymph nodes.  No focal organized pelvic fluid collection.     No acute osseous abnormalities.  No significant inguinal lymphadenopathy.     Impression:     This report was flagged in Epic as abnormal.     1. Findings most consistent with acute appendicitis.  No abscess or free air at this time.  2. Large amount of stool in the rectum suggests constipation.  3. Please see above for several additional findings.        Electronically signed by:Dusty Holloway MD  Date:                                            01/20/2025      Endoscopy reviewed:   Findings:       The examined esophagus was normal.        Diffuse mild inflammation characterized by friability and        granularity was found in the gastric body. Biopsies were taken with        a cold forceps for Helicobacter pylori testing. recently treated and        confirmed eradication for Hpylori.        The cardia and gastric fundus were normal on retroflexion.        A few localized erosions without bleeding were found in the duodenal        bulb. Biopsies for histology were taken with a cold forceps for        evaluation of celiac disease. Biopsies for celiac  into 2        jars, jar1 3rd portion (normal appearing) and duodenal bulb jar2        (appears erythematous, likely from recent Hpylori)   Impression:            - Normal esophagus.                          - Gastritis. Biopsied.                          - Duodenal erosions without bleeding. Biopsied.   Recommendation:        - Discharge patient to home.                          - Patient has a contact number available for                          emergencies. The signs and symptoms of potential                          delayed complications were discussed with the                          patient. Return to normal activities tomorrow.                          Written discharge instructions were provided to                           the patient.                          - Resume previous diet.                          - Continue present medications.                          - Await pathology results.                          - Continue protonix daily.   Emerson Fontaine MD   5/12/2021 2:00:03 PM     1.  Duodenum, 3rd and 4th portion, biopsy:   - Small intestinal mucosa with preserved villous architecture.   - Negative for increased intraepithelial lymphocytes, granulomata or   dysplasia.   2. Duodenal bulb, biopsy:   - Small intestinal mucosa with preserved villous architecture.   - Negative for increased intraepithelial lymphocytes, granulomata or   dysplasia.   3. Random stomach, biopsy:   - Gastric type mucosa with moderate chronic gastritis and reactive changes.   - Immunohistochemistry with appropriate control for H pylori is negative.     Assessment:  1. Epigastric abdominal pain    2. Right lower quadrant abdominal pain    3. Chronic Helicobacter pylori gastritis    4. Constipation, unspecified constipation type    5. Diverticulitis      Orders Placed This Encounter    H. PYLORI ANTIBODY, IGG    pantoprazole (PROTONIX) 40 MG tablet       Plan:   Egd and colonoscopy  H pylori testing     For GERD/Reflux:  Take your PPI 30-45 minutes before your first protein containing meal (breakfast) every day. Take daily for 8 weeks. If symptoms improve ok discontinue an use PPI as needed for symptoms.   Take Pepcid 20 mg every evening before bedtime to help with nocturnal symptoms, as needed.  Remain upright for at least 3 hours after eating.   Elevate the head of the bed for nighttime.   Avoid foods that you have noticed make your symptoms worse (possible triggers include: peppermint, alcohol, chocolate, caffeine, spicy foods, greasy/fried foods, acidic foods-citrus).   Lose weight if you are overweight -- of all of the lifestyle changes you can make, this one is the most effective.  Follow up in 8 weeks with provider to assess  symptoms and ability to taper off PPI (can be a virtual visit).       Plan Constipation:   Start daily fiber. Take 1 tsp of fiber powder (psyllium or other sugar-free powder).  Mix in 8 oz of water.  Take x 3-5 days.  Then, increase fiber by 1 tsp every 3-5 days until stool is easy to pass.  Stop and continue at that dose.   Do not exceed 6 tsps/day. GOAL:  More well-formed stool (one continuous well-formed piece vs. Pellets) and minimize straining with initiation. Can cause increased gas.   Start miralax daily (17g per dose). Start at once per day and can titrate up to twice daily. Decrease and/or stop Miralax if stools become softer/liquid in consistency.   Increase water intake to 8-10 glasses of water per day  Stay active. 30-45 minutes of moderate activity 3-4 times per week. (Mobility=motility)       Thank you for allowing me to participate in this patient's care.    Sincerely,     TAMMY AGUILERA-C  Gastroenterology Department  [facility] [address] Office [phone]
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
## 2025-01-28 LAB — H PYLORI IGG SERPL QL IA: NEGATIVE

## 2025-01-30 ENCOUNTER — TELEPHONE (OUTPATIENT)
Dept: ENDOSCOPY | Facility: HOSPITAL | Age: 35
End: 2025-01-30
Payer: COMMERCIAL

## 2025-01-30 NOTE — TELEPHONE ENCOUNTER
Spoke to patient for pre-call to confirm scheduled Colonoscopy and patient verbalized understanding of the following:       Date of Procedure (s)  verified 2/5/25  Arrival Time 8:00 AM verified.  Location of Procedure(s) [place] [address]nd Floor verified.  NPO status reinforced. Ok to continue clear liquids up until 2 hours prior to the Endoscopy procedure.     Pt confirmed receipt of prep instructions and Rx prep (if applicable).  Instructions provided to patient via [facility]  Pt confirmed ride home after procedure if procedure requires anesthesia.   Pre-call screening questionnaire reviewed and completed with patient.   Appointment details are tentative, including check-in time.  If the patient begins taking any blood thinning medications, injectable weight loss/diabetes medications (other than insulin), or Adipex (phentermine) patient was instructed to contact the endoscopy scheduling department as soon as possible.  Patient was advised to call the endoscopy scheduling department if any questions or concerns arise.       [facility]oscopy Scheduling Department

## 2025-01-31 ENCOUNTER — ANESTHESIA EVENT (OUTPATIENT)
Dept: ENDOSCOPY | Facility: HOSPITAL | Age: 35
End: 2025-01-31
Payer: COMMERCIAL

## 2025-01-31 NOTE — ANESTHESIA PREPROCEDURE EVALUATION
01/31/2025  Phoenix Jones is a 34 y.o., male.    Procedure: EGD (ESOPHAGOGASTRODUODENOSCOPY) (N/A), COLONOSCOPY (N/A)         Patient Active Problem List   Diagnosis    Vitamin D deficiency    KASH (obstructive sleep apnea)    Chronic superficial gastritis without bleeding    Hepatic steatosis    Chronic allergic rhinitis due to pollen    Bicuspid aortic valve    H. pylori infection    Overweight (BMI 25.0-29.9)    Migraine without status migrainosus, not intractable    Abdominal pain       Past Medical History:   Diagnosis Date    Bicuspid aortic valve 04/02/2024 2-2021  The left ventricle is normal in size with normal systolic function. The estimated ejection fraction is 60%  Normal right ventricular size with normal right ventricular systolic function.  Normal left ventricular diastolic function.  Bicuspid aortic valve with normal leaflet mobility and mild aortic regurgitation.  The estimated PA systolic pressure is 39 mmHg.  Normal central venous pressu    Chronic allergic rhinitis due to pollen 04/02/2024    Chronic superficial gastritis without bleeding 08/22/2023    EGD 5-  - Normal esophagus.    - Gastritis. Biopsied.   - Duodenal erosions without bleeding. Biopsied.   The biopsies show some chronic inflammation of the stomach, which is likely from the Hpylori that was treated IN THE PAST. There is NO evidence of ongoing Hpylori infection (no ACTIVE infection, this is both by biopsy as well as the negative stool test months ago)      H. pylori infection 04/29/2024    Re treatment 4-2024 Quadruple therapy      Hepatic steatosis 08/22/2023    3-2021 US - mild hepatic steatosis    History of Helicobacter pylori infection     Treated    KASH (obstructive sleep apnea) 03/11/2021    Vitamin D deficiency 12/06/2020       ECHO: Results for orders placed during the hospital encounter of  12/06/24    Echo    Interpretation Summary    Left Ventricle: The left ventricle is normal in size. Normal wall thickness. There is normal systolic function with a visually estimated ejection fraction of 60 - 65%. There is normal diastolic function.    Right Ventricle: Normal right ventricular cavity size. Systolic function is normal.    Aortic Valve: The aortic valve is a bicuspid valve with probable commisural fusion of the right and left cusps. There is mild aortic regurgitation.    Mitral Valve: There is mild regurgitation.    Tricuspid Valve: There is mild regurgitation.    IVC/SVC: Normal venous pressure at 3 mmHg.      There is no height or weight on file to calculate BMI.    Tobacco Use: Medium Risk (1/27/2025)    Patient History     Smoking Tobacco Use: Former     Smokeless Tobacco Use: Never     Passive Exposure: Not on file       Social History     Substance and Sexual Activity   Drug Use No        Alcohol Use: Unknown (5/4/2020)    AUDIT-C     Frequency of Alcohol Consumption: Not on file     Average Number of Drinks: Patient declined     Frequency of Binge Drinking: Never       Review of patient's allergies indicates:   Allergen Reactions    Augmentin [amoxicillin-pot clavulanate] Rash         Airway:  No value filed.    Pre-op Assessment    I have reviewed the Patient Summary Reports.    I have reviewed the NPO Status.   I have reviewed the Medications.     Review of Systems  Anesthesia Hx:             Denies Family Hx of Anesthesia complications.    Denies Personal Hx of Anesthesia complications.                    Hematology/Oncology:  Hematology Normal   Oncology Normal                                   EENT/Dental:  EENT/Dental Normal           Cardiovascular:  Cardiovascular Normal                                              Pulmonary:        Sleep Apnea                Renal/:  Renal/ Normal                 Hepatic/GI:      Liver Disease,               Musculoskeletal:  Musculoskeletal Normal                 Neurological:      Headaches                                 Endocrine:  Endocrine Normal            Dermatological:  Skin Normal    Psych:  Psychiatric Normal                       Anesthesia Plan  Type of Anesthesia, risks & benefits discussed:    Anesthesia Type: Gen Natural Airway  Intra-op Monitoring Plan: Standard ASA Monitors  Post Op Pain Control Plan: multimodal analgesia  Induction:  IV  Informed Consent: Informed consent signed with the Patient and all parties understand the risks and agree with anesthesia plan.  All questions answered.   ASA Score: 2  Day of Surgery Review of History & Physical: H&P Update referred to the surgeon/provider.    Ready For Surgery From Anesthesia Perspective.     .

## 2025-02-03 ENCOUNTER — TELEPHONE (OUTPATIENT)
Dept: ENDOSCOPY | Facility: HOSPITAL | Age: 35
End: 2025-02-03
Payer: COMMERCIAL

## 2025-02-03 ENCOUNTER — OFFICE VISIT (OUTPATIENT)
Dept: SURGERY | Facility: CLINIC | Age: 35
End: 2025-02-03
Attending: EMERGENCY MEDICINE
Payer: COMMERCIAL

## 2025-02-03 ENCOUNTER — HOSPITAL ENCOUNTER (OUTPATIENT)
Dept: RADIOLOGY | Facility: HOSPITAL | Age: 35
Discharge: HOME | End: 2025-02-03
Attending: SURGERY
Payer: COMMERCIAL

## 2025-02-03 VITALS
HEIGHT: 63 IN | OXYGEN SATURATION: 96 % | DIASTOLIC BLOOD PRESSURE: 69 MMHG | WEIGHT: 155 LBS | HEART RATE: 88 BPM | BODY MASS INDEX: 27.46 KG/M2 | SYSTOLIC BLOOD PRESSURE: 122 MMHG

## 2025-02-03 DIAGNOSIS — R10.9 UNSPECIFIED ABDOMINAL PAIN: ICD-10-CM

## 2025-02-03 DIAGNOSIS — R10.31 RIGHT LOWER QUADRANT ABDOMINAL PAIN: ICD-10-CM

## 2025-02-03 PROCEDURE — 3008F BODY MASS INDEX DOCD: CPT | Mod: CPTII,S$GLB,, | Performed by: SPECIALIST

## 2025-02-03 PROCEDURE — 25500020 PHARM REV CODE 255: Performed by: SURGERY

## 2025-02-03 PROCEDURE — A9698 NON-RAD CONTRAST MATERIALNOC: HCPCS | Performed by: SURGERY

## 2025-02-03 PROCEDURE — 3078F DIAST BP <80 MM HG: CPT | Mod: CPTII,S$GLB,, | Performed by: SPECIALIST

## 2025-02-03 PROCEDURE — 1160F RVW MEDS BY RX/DR IN RCRD: CPT | Mod: CPTII,S$GLB,, | Performed by: SPECIALIST

## 2025-02-03 PROCEDURE — 3074F SYST BP LT 130 MM HG: CPT | Mod: CPTII,S$GLB,, | Performed by: SPECIALIST

## 2025-02-03 PROCEDURE — 99999 PR PBB SHADOW E&M-EST. PATIENT-LVL IV: CPT | Mod: PBBFAC,,, | Performed by: SPECIALIST

## 2025-02-03 PROCEDURE — 99213 OFFICE O/P EST LOW 20 MIN: CPT | Mod: S$GLB,,, | Performed by: SPECIALIST

## 2025-02-03 PROCEDURE — 74177 CT ABD & PELVIS W/CONTRAST: CPT | Mod: 26,,, | Performed by: RADIOLOGY

## 2025-02-03 PROCEDURE — 74177 CT ABD & PELVIS W/CONTRAST: CPT | Mod: TC

## 2025-02-03 PROCEDURE — 1159F MED LIST DOCD IN RCRD: CPT | Mod: CPTII,S$GLB,, | Performed by: SPECIALIST

## 2025-02-03 RX ADMIN — IOHEXOL 75 ML: 350 INJECTION, SOLUTION INTRAVENOUS at 07:02

## 2025-02-03 RX ADMIN — BARIUM SULFATE 450 ML: 20 SUSPENSION ORAL at 07:02

## 2025-02-04 ENCOUNTER — OFFICE VISIT (OUTPATIENT)
Dept: SURGERY | Facility: CLINIC | Age: 35
End: 2025-02-04
Payer: COMMERCIAL

## 2025-02-04 VITALS
HEART RATE: 76 BPM | WEIGHT: 151.44 LBS | SYSTOLIC BLOOD PRESSURE: 112 MMHG | OXYGEN SATURATION: 99 % | DIASTOLIC BLOOD PRESSURE: 72 MMHG | BODY MASS INDEX: 26.83 KG/M2 | HEIGHT: 63 IN

## 2025-02-04 DIAGNOSIS — Z12.11 COLON CANCER SCREENING: Primary | ICD-10-CM

## 2025-02-04 DIAGNOSIS — L23.9 ALLERGIC DERMATITIS: Primary | ICD-10-CM

## 2025-02-04 PROCEDURE — 99213 OFFICE O/P EST LOW 20 MIN: CPT | Mod: S$GLB,,, | Performed by: SURGERY

## 2025-02-04 PROCEDURE — 3074F SYST BP LT 130 MM HG: CPT | Mod: CPTII,S$GLB,, | Performed by: SURGERY

## 2025-02-04 PROCEDURE — 99999 PR PBB SHADOW E&M-EST. PATIENT-LVL III: CPT | Mod: PBBFAC,,, | Performed by: SURGERY

## 2025-02-04 PROCEDURE — 3078F DIAST BP <80 MM HG: CPT | Mod: CPTII,S$GLB,, | Performed by: SURGERY

## 2025-02-04 PROCEDURE — 3008F BODY MASS INDEX DOCD: CPT | Mod: CPTII,S$GLB,, | Performed by: SURGERY

## 2025-02-04 PROCEDURE — 1159F MED LIST DOCD IN RCRD: CPT | Mod: CPTII,S$GLB,, | Performed by: SURGERY

## 2025-02-04 RX ORDER — SODIUM, POTASSIUM,MAG SULFATES 17.5-3.13G
1 SOLUTION, RECONSTITUTED, ORAL ORAL DAILY
Qty: 1 KIT | Refills: 0 | Status: SHIPPED | OUTPATIENT
Start: 2025-02-04 | End: 2025-02-06

## 2025-02-04 RX ORDER — HYDROCORTISONE 25 MG/G
CREAM TOPICAL 2 TIMES DAILY
Qty: 20 G | Refills: 1 | Status: SHIPPED | OUTPATIENT
Start: 2025-02-04 | End: 2025-02-11

## 2025-02-04 NOTE — PROGRESS NOTES
34-year-old male with history of abdominal pain [place] [facility] [place]   At that time CT scan suggested acute appendicitis   Clinical presentation was not consistent with appendicitis per general surgery consultation  Patient placed on 10 day course of Augmentin  Patient has subsequently stopped antibiotics due to rash per recommendation of general surgery at clinic visit 01/27/2025  Follow-up CT ordered as well as clinic visit with GI Medicine    Subjective   Intermittent loose stool  No fever, chills, diaphoresis  No abdominal pain in last 4 days  Tolerating diet    PE  Afebrile  Vital signs stable  WD/WN male, NAD  HEENT-anicteric  Neck-supple, trachea midline  Chest-clear  Heart-regular rate and rhythm  Abdomen-soft, no tenderness, no guarding, no rebound, no rigidity  Extremities-no tenderness    Impression/plan   No clinical evidence of appendicitis identifiable at this time  Keep appointment for follow-up CT scan of abdomen/pelvis scheduled for this p.m.  We will contact patient after CT scan  Symptoms of appendicitis reviewed, patient advised to present to ER if symptoms of acute appendicitis developed

## 2025-02-04 NOTE — PROGRESS NOTES
General Surgery Office Visit   History and Physical    Patient Name: Phoenix Jones  YOB: 1990 (34 y.o.)  MRN: [id number]  Today's Date: 02/04/2025    Referring Md:   No referring provider defined for this encounter.    SUBJECTIVE:     Chief Complaint: Rash    History of Present Illness:  Phoenix Jones is a 34 y.o. male with PMHx of migraine, bicuspid aortic valve, vitamin D deficiency H. Pylori, KASH who presents to the clinic today for follow up. He recently went to the ED for abdominal pain. CT with dilated appendix, treated conservatively with abx as H&P was not consistent with appendicitis. Developed a rash and stopped abx. He denies abdominal pain today. Does have intermittent pain that alternates between RLQ and LLQ. Has 2-3 BM per day. Seen by general surgery, Dr. Phillip, yesterday with low suspicion for appendicitis. Has also seen GI with plans for scope tomorrow. Repeat CT a/p yesterday, read pending. He also reports developing a rash to his abdomen and BUE after the CT. It is not painful but itchy. He denies fever, chills, n/v/d, constipation, hematochezia, dysuria, hematuria, CP, SOB, and all other symptoms. Patient reports being compliant with home medication regimen.      was offered for this encounter, patient declined.     Patient denies personal history of MI, CVA, lung disease, DM  Previous abdominal surgeries include: none  Not currently on any anticoagulants      Review of patient's allergies indicates:   Allergen Reactions    Augmentin [amoxicillin-pot clavulanate] Rash    Iodinated contrast media Hives       Past Medical History:   Diagnosis Date    Bicuspid aortic valve 04/02/2024 2-2021  The left ventricle is normal in size with normal systolic function. The estimated ejection fraction is 60%  Normal right ventricular size with normal right ventricular systolic function.  Normal left ventricular diastolic function.  Bicuspid aortic valve with normal leaflet mobility and mild  aortic regurgitation.  The estimated PA systolic pressure is 39 mmHg.  Normal central venous pressu    Chronic allergic rhinitis due to pollen 04/02/2024    Chronic superficial gastritis without bleeding 08/22/2023    EGD 5-  - Normal esophagus.    - Gastritis. Biopsied.   - Duodenal erosions without bleeding. Biopsied.   The biopsies show some chronic inflammation of the stomach, which is likely from the Hpylori that was treated IN THE PAST. There is NO evidence of ongoing Hpylori infection (no ACTIVE infection, this is both by biopsy as well as the negative stool test months ago)      H. pylori infection 04/29/2024    Re treatment 4-2024 Quadruple therapy      Hepatic steatosis 08/22/2023    3-2021 US - mild hepatic steatosis    History of Helicobacter pylori infection     Treated    KASH (obstructive sleep apnea) 03/11/2021    Vitamin D deficiency 12/06/2020     Past Surgical History:   Procedure Laterality Date    ESOPHAGOGASTRODUODENOSCOPY  05/12/2021    ESOPHAGOGASTRODUODENOSCOPY N/A 5/12/2021    Procedure: EGD (ESOPHAGOGASTRODUODENOSCOPY) Covid test 5/9/21;  Surgeon: Emerson Fontaine MD;  Location: [facility];  Service: Endoscopy;  Laterality: N/A;  with G / D biopsies     Family History   Problem Relation Name Age of Onset    No Known Problems Mother      No Known Problems Father      No Known Problems Sister      Thyroid disease Daughter       Social History     Tobacco Use    Smoking status: Former    Smokeless tobacco: Never   Substance Use Topics    Alcohol use: No    Drug use: No        Review of Systems:  Review of Systems   Constitutional:  Negative for chills and fever.   Respiratory:  Negative for cough and shortness of breath.    Cardiovascular:  Negative for chest pain.   Gastrointestinal:  Negative for abdominal pain, blood in stool, constipation, diarrhea, nausea and vomiting.   Genitourinary:  Negative for dysuria and hematuria.   Musculoskeletal:  Negative for falls.   Skin:  Positive for  "itching and rash.   Neurological:  Negative for dizziness and headaches.   Psychiatric/Behavioral:  Negative for substance abuse. The patient is not nervous/anxious.    All other systems reviewed and are negative.      OBJECTIVE:     Vital Signs (Most Recent)  /72 (BP Location: Left arm, Patient Position: Sitting)   Pulse 76   Ht 5' 3" (1.6 m)   Wt 68.7 kg (151 lb 7.3 oz)   SpO2 99%   BMI 26.83 kg/m²     Physical Exam  Vitals and nursing note reviewed.   Constitutional:       General: He is not in acute distress.     Appearance: He is not ill-appearing or diaphoretic.      Comments: Room air   HENT:      Head: Normocephalic and atraumatic.      Mouth/Throat:      Mouth: Mucous membranes are moist.      Pharynx: Oropharynx is clear.   Eyes:      Extraocular Movements: Extraocular movements intact.      Conjunctiva/sclera: Conjunctivae normal.   Cardiovascular:      Rate and Rhythm: Normal rate.   Pulmonary:      Effort: Pulmonary effort is normal. No respiratory distress.   Abdominal:      General: There is no distension.      Palpations: Abdomen is soft.      Tenderness: There is no abdominal tenderness. There is no guarding or rebound.      Comments: No TTP diffusely to light or deep palpation. No peritonitic signs    Musculoskeletal:         General: No deformity.   Skin:     General: Skin is warm and dry.      Coloration: Skin is not jaundiced.      Comments: Diffuse erythematous rash to abdomen and BUE. No drainage or scaling noted. Minimally TTP   Neurological:      Mental Status: He is alert and oriented to person, place, and time.           Labs:   Lab Results   Component Value Date    WBC 6.51 01/20/2025    HGB 14.3 01/20/2025    HCT 43 01/20/2025    MCV 88 01/20/2025     01/20/2025       CMP  Sodium   Date Value Ref Range Status   01/20/2025 135 (L) 136 - 145 mmol/L Final     Potassium   Date Value Ref Range Status   01/20/2025 3.8 3.5 - 5.1 mmol/L Final     Chloride   Date Value Ref Range " Status   01/20/2025 102 95 - 110 mmol/L Final     CO2   Date Value Ref Range Status   01/20/2025 25 23 - 29 mmol/L Final     Glucose   Date Value Ref Range Status   01/20/2025 78 70 - 110 mg/dL Final     BUN   Date Value Ref Range Status   01/20/2025 14 6 - 20 mg/dL Final     Creatinine   Date Value Ref Range Status   01/20/2025 0.7 0.5 - 1.4 mg/dL Final     Calcium   Date Value Ref Range Status   01/20/2025 9.3 8.7 - 10.5 mg/dL Final     Total Protein   Date Value Ref Range Status   01/20/2025 8.2 6.0 - 8.4 g/dL Final     Albumin   Date Value Ref Range Status   01/20/2025 4.2 3.5 - 5.2 g/dL Final     Total Bilirubin   Date Value Ref Range Status   01/20/2025 0.6 0.1 - 1.0 mg/dL Final     Comment:     For infants and newborns, interpretation of results should be based  on gestational age, weight and in agreement with clinical  observations.    Premature Infant recommended reference ranges:  Up to 24 hours.............<8.0 mg/dL  Up to 48 hours............<12.0 mg/dL  3-5 days..................<15.0 mg/dL  6-29 days.................<15.0 mg/dL       Alkaline Phosphatase   Date Value Ref Range Status   01/20/2025 61 40 - 150 U/L Final     AST   Date Value Ref Range Status   01/20/2025 15 10 - 40 U/L Final     ALT   Date Value Ref Range Status   01/20/2025 19 10 - 44 U/L Final     Anion Gap   Date Value Ref Range Status   01/20/2025 8 8 - 16 mmol/L Final     eGFR   Date Value Ref Range Status   01/20/2025 >60.0 >60 mL/min/1.73 m^2 Final       Imaging:   Reviewed CT a/p 2/3 with Dr. Palmer (final read pending). No appendicitis noted  ntains abnormal data CT Abdomen Pelvis With IV Contrast NO Oral Contrast  Order: [id number]  Status: Final result       Visible to patient: Yes (seen)       Next appt: 03/02/2026 at 02:30 PM in Dermatology (Keke Rivera MD)    0 Result Notes  Details    Reading Physician Reading Date Result Priority   Dusty Holloway MD  [phone] 1/20/2025 STAT     Narrative &  Impression  EXAMINATION:  CT ABDOMEN PELVIS WITH IV CONTRAST     CLINICAL HISTORY:  RLQ abdominal pain (Age >= 14y);     TECHNIQUE:  Low dose axial images, sagittal and coronal reformations were obtained from the lung bases to the pubic symphysis following the IV administration of 75 mL of Omnipaque 350 .  Oral contrast was not given.     COMPARISON:  Ultrasound 03/16/2021     FINDINGS:  Images of the lower thorax are unremarkable.     The liver, spleen, pancreas, gallbladder and adrenal glands are grossly unremarkable.  There is no biliary dilation or ascites.  The portal vein, splenic vein, SMV, celiac axis and SMA all are patent.  No significant abdominal lymphadenopathy.     The kidneys enhance symmetrically without hydronephrosis or nephrolithiasis.  The bilateral ureters are unremarkable without calculi seen.  The urinary bladder is unremarkable.  The prostate is not enlarged.     There is moderate to large amount of stool in the rectum.  There are a few scattered colonic diverticula without surrounding inflammation to suggest diverticulitis.  The terminal ileum is unremarkable.  The appendix is distended measuring up to 1 cm in diameter noting slight inflammation about the mid aspect of the appendix.  No focal organized collection.  No free air.  The small bowel is grossly unremarkable.  There are a few scattered shotty periaortic and paracaval lymph nodes.  There are a few scattered prominent pericecal lymph nodes.  No focal organized pelvic fluid collection.     No acute osseous abnormalities.  No significant inguinal lymphadenopathy.     Impression:     This report was flagged in Epic as abnormal.     1. Findings most consistent with acute appendicitis.  No abscess or free air at this time.  2. Large amount of stool in the rectum suggests constipation.  3. Please see above for several additional findings         ASSESSMENT/PLAN:     Phoenix Jones is a 34 y.o. male with PMHx of migraine, bicuspid aortic valve,  vitamin D deficiency H. Pylori, KASH who presents to the clinic today for follow up, rule out appendicitis. Clinically stable but with allergic dermatitis.     Phoenix was seen today for follow-up.    Diagnoses and all orders for this visit:    Allergic dermatitis    Other orders  -     hydrocortisone 2.5 % cream; Apply topically 2 (two) times daily. for 7 days        - Believe abdominal symptoms are not 2/2 to appendicitis. Has been off abx for a week, no consistent abdominal pain, no fevers or anorexia. Agree with scope by GI and follow up with their team   - Patient with rash after CT a/p with IV contrast x2. Initially thought to be 2/2 to Augmentin, however, given second reaction after repeat CT, believe it is more likely allergy to IV contrast. No SOB or throat swelling  - PO benadryl and topical steroid cream. Allergy added to chart   - ED precautions given  - RTC PRN  - Continue any current medications  - Call with any questions or concerns  - Discussed POC with patient. All questions were answered. Patient is agreeable to plan and verbalized understanding.     Case discussed with Dr. Palmer. Patient seen and examined by Dr. Palmer.    Vargas Gaiatn, HIWOTS, PA-C  General Surgery  - [facility]

## 2025-02-05 ENCOUNTER — ANESTHESIA (OUTPATIENT)
Dept: ENDOSCOPY | Facility: HOSPITAL | Age: 35
End: 2025-02-05
Payer: COMMERCIAL

## 2025-02-05 ENCOUNTER — HOSPITAL ENCOUNTER (OUTPATIENT)
Facility: HOSPITAL | Age: 35
Discharge: HOME | End: 2025-02-05
Attending: INTERNAL MEDICINE | Admitting: INTERNAL MEDICINE
Payer: COMMERCIAL

## 2025-02-05 VITALS
OXYGEN SATURATION: 100 % | SYSTOLIC BLOOD PRESSURE: 129 MMHG | TEMPERATURE: 98 F | DIASTOLIC BLOOD PRESSURE: 74 MMHG | RESPIRATION RATE: 16 BRPM | HEART RATE: 69 BPM

## 2025-02-05 DIAGNOSIS — Z12.11 SCREEN FOR COLON CANCER: ICD-10-CM

## 2025-02-05 PROBLEM — Z86.0100 HISTORY OF COLON POLYPS: Status: ACTIVE | Noted: 2025-02-05

## 2025-02-05 PROCEDURE — 88342 IMHCHEM/IMCYTCHM 1ST ANTB: CPT | Performed by: PATHOLOGY

## 2025-02-05 PROCEDURE — 63600175 PHARM REV CODE 636 W HCPCS: Performed by: NURSE ANESTHETIST, CERTIFIED REGISTERED

## 2025-02-05 PROCEDURE — 88305 TISSUE EXAM BY PATHOLOGIST: CPT | Mod: 26,,, | Performed by: PATHOLOGY

## 2025-02-05 PROCEDURE — 37000009 HC ANESTHESIA EA ADD 15 MINS: Performed by: INTERNAL MEDICINE

## 2025-02-05 PROCEDURE — 37000008 HC ANESTHESIA 1ST 15 MINUTES: Performed by: INTERNAL MEDICINE

## 2025-02-05 PROCEDURE — 43239 EGD BIOPSY SINGLE/MULTIPLE: CPT | Performed by: INTERNAL MEDICINE

## 2025-02-05 PROCEDURE — 25000003 PHARM REV CODE 250: Performed by: INTERNAL MEDICINE

## 2025-02-05 PROCEDURE — 88341 IMHCHEM/IMCYTCHM EA ADD ANTB: CPT | Performed by: PATHOLOGY

## 2025-02-05 PROCEDURE — D9220A PRA ANESTHESIA: Mod: ,,, | Performed by: NURSE ANESTHETIST, CERTIFIED REGISTERED

## 2025-02-05 PROCEDURE — 88342 IMHCHEM/IMCYTCHM 1ST ANTB: CPT | Mod: 26,,, | Performed by: PATHOLOGY

## 2025-02-05 PROCEDURE — 88305 TISSUE EXAM BY PATHOLOGIST: CPT | Performed by: PATHOLOGY

## 2025-02-05 PROCEDURE — 94761 N-INVAS EAR/PLS OXIMETRY MLT: CPT

## 2025-02-05 PROCEDURE — 99900035 HC TECH TIME PER 15 MIN (STAT)

## 2025-02-05 PROCEDURE — 27201012 HC FORCEPS, HOT/COLD, DISP: Performed by: INTERNAL MEDICINE

## 2025-02-05 PROCEDURE — 27201089 HC SNARE, DISP (ANY): Performed by: INTERNAL MEDICINE

## 2025-02-05 PROCEDURE — 45385 COLONOSCOPY W/LESION REMOVAL: CPT | Performed by: INTERNAL MEDICINE

## 2025-02-05 PROCEDURE — 88341 IMHCHEM/IMCYTCHM EA ADD ANTB: CPT | Mod: 26,,, | Performed by: PATHOLOGY

## 2025-02-05 RX ORDER — LIDOCAINE HYDROCHLORIDE 20 MG/ML
INJECTION INTRAVENOUS
Status: DISCONTINUED | OUTPATIENT
Start: 2025-02-05 | End: 2025-02-05

## 2025-02-05 RX ORDER — SODIUM CHLORIDE 9 MG/ML
INJECTION, SOLUTION INTRAVENOUS CONTINUOUS
Status: DISCONTINUED | OUTPATIENT
Start: 2025-02-05 | End: 2025-02-05 | Stop reason: HOSPADM

## 2025-02-05 RX ORDER — PROPOFOL 10 MG/ML
VIAL (ML) INTRAVENOUS
Status: DISCONTINUED | OUTPATIENT
Start: 2025-02-05 | End: 2025-02-05

## 2025-02-05 RX ORDER — PROPOFOL 10 MG/ML
VIAL (ML) INTRAVENOUS CONTINUOUS PRN
Status: DISCONTINUED | OUTPATIENT
Start: 2025-02-05 | End: 2025-02-05

## 2025-02-05 RX ADMIN — LIDOCAINE HYDROCHLORIDE 100 MG: 20 INJECTION INTRAVENOUS at 09:02

## 2025-02-05 RX ADMIN — PROPOFOL 200 MCG/KG/MIN: 10 INJECTION, EMULSION INTRAVENOUS at 09:02

## 2025-02-05 RX ADMIN — SODIUM CHLORIDE: 0.9 INJECTION, SOLUTION INTRAVENOUS at 09:02

## 2025-02-05 RX ADMIN — PROPOFOL 100 MG: 10 INJECTION, EMULSION INTRAVENOUS at 09:02

## 2025-02-05 RX ADMIN — PROPOFOL 30 MG: 10 INJECTION, EMULSION INTRAVENOUS at 09:02

## 2025-02-05 RX ADMIN — PROPOFOL 20 MG: 10 INJECTION, EMULSION INTRAVENOUS at 09:02

## 2025-02-05 NOTE — PROVATION PATIENT INSTRUCTIONS
Discharge Summary/Instructions after an Endoscopic Procedure  Patient Name: Phoenix Jones  Patient MRN: [id number]  Patient YOB: 1990 Wednesday, February 5, 2025  Grzegorz Nuñez MD  Dear patient,  As a result of recent federal legislation (The Federal Cures Act), you may   receive lab or pathology results from your procedure in your [facility]   account before your physician is able to contact you. Your physician or   their representative will relay the results to you with their   recommendations at their soonest availability.  Thank you,  RESTRICTIONS:  During your procedure today, you received medications for sedation.  These   medications may affect your judgment, balance and coordination.  Therefore,   for 24 hours, you have the following restrictions:   - DO NOT drive a car, operate machinery, make legal/financial decisions,   sign important papers or drink alcohol.    ACTIVITY:  Today: no heavy lifting, straining or running due to procedural   sedation/anesthesia.  The following day: return to full activity including work.  DIET:  Eat and drink normally unless instructed otherwise.     TREATMENT FOR COMMON SIDE EFFECTS:  - Mild abdominal pain, nausea, belching, bloating or excessive gas:  rest,   eat lightly and use a heating pad.  - Sore Throat: treat with throat lozenges and/or gargle with warm salt   water.  - Because air was used during the procedure, expelling large amounts of air   from your rectum or belching is normal.  - If a bowel prep was taken, you may not have a bowel movement for 1-3 days.    This is normal.  SYMPTOMS TO WATCH FOR AND REPORT TO YOUR PHYSICIAN:  1. Abdominal pain or bloating, other than gas cramps.  2. Chest pain.  3. Back pain.  4. Signs of infection such as: chills or fever occurring within 24 hours   after the procedure.  5. Rectal bleeding, which would show as bright red, maroon, or black stools.   (A tablespoon of blood from the rectum is not serious, especially if    hemorrhoids are present.)  6. Vomiting.  7. Weakness or dizziness.  GO DIRECTLY TO THE NEAREST EMERGENCY ROOM IF YOU HAVE ANY OF THE FOLLOWING:      Difficulty breathing              Chills and/or fever over 101 F   Persistent vomiting and/or vomiting blood   Severe abdominal pain   Severe chest pain   Black, tarry stools   Bleeding- more than one tablespoon   Any other symptom or condition that you feel may need urgent attention  Your doctor recommends these additional instructions:  If any biopsies were taken, your doctors clinic will contact you in 1 to 2   weeks with any results.  - Discharge patient to home (ambulatory).   - Patient has a contact number available for emergencies.  The signs and   symptoms of potential delayed complications were discussed with the   patient.  Return to normal activities tomorrow.  Written discharge   instructions were provided to the patient.   - Await pathology results.   - Return to GI clinic in 3 weeks.  For questions, problems or results please call your physician - Grzegorz Nuñez MD at Work:  [phone].  [facility], EMERGENCY ROOM PHONE NUMBER: [phone]  IF A COMPLICATION OR EMERGENCY SITUATION ARISES AND YOU ARE UNABLE TO REACH   YOUR PHYSICIAN - GO DIRECTLY TO THE EMERGENCY ROOM.  MD Grzegorz Sanchez MD  2/5/2025 10:30:10 AM  This report has been verified and signed electronically.  Dear patient,  As a result of recent federal legislation (The Federal Cures Act), you may   receive lab or pathology results from your procedure in your [facility]   account before your physician is able to contact you. Your physician or   their representative will relay the results to you with their   recommendations at their soonest availability.  Thank you,  PROVATION

## 2025-02-05 NOTE — TRANSFER OF CARE
Anesthesia Transfer of Care Note    Patient: Phoenix Jones    Procedure(s) Performed: Procedure(s) (LRB):  EGD (ESOPHAGOGASTRODUODENOSCOPY) (N/A)  COLONOSCOPY (N/A)    Patient location: PACU    Anesthesia Type: general    Transport from OR: Transported from OR on 2-3 L/min O2 by NC with adequate spontaneous ventilation    Post pain: adequate analgesia    Post assessment: no apparent anesthetic complications and tolerated procedure well    Post vital signs: stable    Level of consciousness: responds to stimulation    Nausea/Vomiting: no nausea/vomiting    Complications: none    Transfer of care protocol was followed      Last vitals: Visit Vitals  /71 (BP Location: Left arm, Patient Position: Lying)   Pulse 67   Temp 36.3 °C (97.4 °F) (Temporal)   Resp 18   SpO2 99%

## 2025-02-05 NOTE — NURSING
Pt in preop bay 4, VSS,  and IV inserted. Pt denies any open wounds on body or the use of any weight loss injections. Pt needs consent and h&p, otherwise ready to roll.

## 2025-02-05 NOTE — ANESTHESIA POSTPROCEDURE EVALUATION
Anesthesia Post Evaluation    Patient: Phoenix Jones    Procedure(s) Performed: Procedure(s) (LRB):  EGD (ESOPHAGOGASTRODUODENOSCOPY) (N/A)  COLONOSCOPY (N/A)    Final Anesthesia Type: general      Patient location during evaluation: GI PACU  Patient participation: Yes- Able to Participate  Level of consciousness: awake and alert and oriented  Post-procedure vital signs: reviewed and stable  Pain management: adequate  Airway patency: patent    PONV status at discharge: No PONV  Anesthetic complications: no      Cardiovascular status: hemodynamically stable  Respiratory status: spontaneous ventilation and unassisted  Hydration status: euvolemic  Follow-up not needed.              Vitals Value Taken Time   /74 02/05/25 1052   Temp 36.4 °C (97.6 °F) 02/05/25 1020   Pulse 74 02/05/25 1053   Resp 16 02/05/25 1052   SpO2 99 % 02/05/25 1053   Vitals shown include unfiled device data.      Event Time   Out of Recovery 11:04:00         Pain/Carter Score: Carter Score: 10 (2/5/2025 11:04 AM)

## 2025-02-05 NOTE — PROGRESS NOTES
Short Stay Endoscopy History and Physical    PCP - Arpan Bowens MD  Referring Physician - Arpan Bowens MD  [address] [place] [address]    Procedure - Colonoscopy/EGD  ASA - per anesthesia  Mallampati - per anesthesia  History of Anesthesia problems - no  Family history Anesthesia problems -  no   Plan of anesthesia - General    HPI  34 y.o. male  Reason for procedure:   Abnormal finding on GI tract imaging [R93.3]  Abdominal pain, unspecified abdominal location [R10.9]        ROS:  Constitutional: No fevers, chills, No weight loss  CV: No chest pain  Pulm: No cough, No shortness of breath  GI: see HPI    Medical History:  has a past medical history of Bicuspid aortic valve (04/02/2024), Chronic allergic rhinitis due to pollen (04/02/2024), Chronic superficial gastritis without bleeding (08/22/2023), H. pylori infection (04/29/2024), Hepatic steatosis (08/22/2023), History of Helicobacter pylori infection, KASH (obstructive sleep apnea) (03/11/2021), and Vitamin D deficiency (12/06/2020).    Surgical History:  has a past surgical history that includes Esophagogastroduodenoscopy (05/12/2021) and Esophagogastroduodenoscopy (N/A, 5/12/2021).    Family History: family history includes No Known Problems in his father, mother, and sister; Thyroid disease in his daughter..    Social History:  reports that he has quit smoking. He has never used smokeless tobacco. He reports that he does not drink alcohol and does not use drugs.    Review of patient's allergies indicates:   Allergen Reactions    Augmentin [amoxicillin-pot clavulanate] Rash    Iodinated contrast media Hives       Medications:   Medications Prior to Admission   Medication Sig Dispense Refill Last Dose/Taking    hydrocortisone 2.5 % cream Apply topically 2 (two) times daily. for 7 days 20 g 1 2/4/2025    ergocalciferol (ERGOCALCIFEROL) 50,000 unit Cap Take 1 capsule (50,000 Units total) by mouth every 7 days. 12 capsule 3 2/1/2025     fluticasone propionate (FLONASE) 50 mcg/actuation nasal spray 1 spray (50 mcg total) by Each Nostril route once daily. 16 g 11 Unknown    omega 3-dha-epa-fish oil 1,200 (144-216) mg Cap Take 2 capsules by mouth once daily.   2/1/2025    pantoprazole (PROTONIX) 40 MG tablet Take 1 tablet (40 mg total) by mouth once daily. 30 tablet 11 Unknown    sodium,potassium,mag sulfates (SUPREP BOWEL PREP KIT) 17.5-3.13-1.6 gram SolR Take 177 mLs by mouth once daily. for 2 days 1 kit 0     vitamin E 400 UNIT capsule Take 1 capsule (400 Units total) by mouth once daily.   1/29/2025    ZOLMitriptan (ZOMIG) 2.5 mg Spry 1 spray by Nasal route every 12 (twelve) hours as needed (Migraine). 6 each 11        Physical Exam:    Vital Signs:   Vitals:    02/05/25 0859   BP: 121/71   Pulse: 67   Resp: 18   Temp: 97.4 °F (36.3 °C)       General Appearance: Well appearing in no acute distress  Abdomen: Soft, non tender, non distended with normal bowel sounds, no masses    Labs:  Lab Results   Component Value Date    WBC 6.51 01/20/2025    HGB 14.3 01/20/2025    HCT 43 01/20/2025     01/20/2025    CHOL 206 (H) 04/02/2024    TRIG 161 (H) 04/02/2024    HDL 41 04/02/2024    ALT 19 01/20/2025    AST 15 01/20/2025     (L) 01/20/2025    K 3.8 01/20/2025     01/20/2025    CREATININE 0.7 01/20/2025    BUN 14 01/20/2025    CO2 25 01/20/2025    TSH 1.058 04/02/2024    INR 1.0 02/17/2019    HGBA1C 5.0 04/02/2024       I have explained the risks and benefits of this endoscopic procedure to the patient including but not limited to bleeding, inflammation, infection, perforation, and death.      Grzegorz Nuñez MD

## 2025-02-05 NOTE — PROVATION PATIENT INSTRUCTIONS
Discharge Summary/Instructions after an Endoscopic Procedure  Patient Name: Phoenix Jones  Patient MRN: [id number]  Patient YOB: 1990 Wednesday, February 5, 2025  Grzegorz Nuñez MD  Dear patient,  As a result of recent federal legislation (The Federal Cures Act), you may   receive lab or pathology results from your procedure in your [facility]   account before your physician is able to contact you. Your physician or   their representative will relay the results to you with their   recommendations at their soonest availability.  Thank you,  RESTRICTIONS:  During your procedure today, you received medications for sedation.  These   medications may affect your judgment, balance and coordination.  Therefore,   for 24 hours, you have the following restrictions:   - DO NOT drive a car, operate machinery, make legal/financial decisions,   sign important papers or drink alcohol.    ACTIVITY:  Today: no heavy lifting, straining or running due to procedural   sedation/anesthesia.  The following day: return to full activity including work.  DIET:  Eat and drink normally unless instructed otherwise.     TREATMENT FOR COMMON SIDE EFFECTS:  - Mild abdominal pain, nausea, belching, bloating or excessive gas:  rest,   eat lightly and use a heating pad.  - Sore Throat: treat with throat lozenges and/or gargle with warm salt   water.  - Because air was used during the procedure, expelling large amounts of air   from your rectum or belching is normal.  - If a bowel prep was taken, you may not have a bowel movement for 1-3 days.    This is normal.  SYMPTOMS TO WATCH FOR AND REPORT TO YOUR PHYSICIAN:  1. Abdominal pain or bloating, other than gas cramps.  2. Chest pain.  3. Back pain.  4. Signs of infection such as: chills or fever occurring within 24 hours   after the procedure.  5. Rectal bleeding, which would show as bright red, maroon, or black stools.   (A tablespoon of blood from the rectum is not serious, especially if    hemorrhoids are present.)  6. Vomiting.  7. Weakness or dizziness.  GO DIRECTLY TO THE NEAREST EMERGENCY ROOM IF YOU HAVE ANY OF THE FOLLOWING:      Difficulty breathing              Chills and/or fever over 101 F   Persistent vomiting and/or vomiting blood   Severe abdominal pain   Severe chest pain   Black, tarry stools   Bleeding- more than one tablespoon   Any other symptom or condition that you feel may need urgent attention  Your doctor recommends these additional instructions:  If any biopsies were taken, your doctors clinic will contact you in 1 to 2   weeks with any results.  - Discharge patient to home (ambulatory).   - Patient has a contact number available for emergencies.  The signs and   symptoms of potential delayed complications were discussed with the   patient.  Return to normal activities tomorrow.  Written discharge   instructions were provided to the patient.   - Await pathology results.   - Return to GI clinic in 3 weeks.  For questions, problems or results please call your physician - Grzegorz Nuñez MD at Work:  [phone].  [facility], EMERGENCY ROOM PHONE NUMBER: [phone]  IF A COMPLICATION OR EMERGENCY SITUATION ARISES AND YOU ARE UNABLE TO REACH   YOUR PHYSICIAN - GO DIRECTLY TO THE EMERGENCY ROOM.  MD Grzegorz Sanchez MD  2/5/2025 10:25:34 AM  This report has been verified and signed electronically.  Dear patient,  As a result of recent federal legislation (The Federal Cures Act), you may   receive lab or pathology results from your procedure in your [facility]   account before your physician is able to contact you. Your physician or   their representative will relay the results to you with their   recommendations at their soonest availability.  Thank you,  PROVATION

## 2025-02-10 ENCOUNTER — OFFICE VISIT (OUTPATIENT)
Dept: OTOLARYNGOLOGY | Facility: CLINIC | Age: 35
End: 2025-02-10
Payer: COMMERCIAL

## 2025-02-10 VITALS
WEIGHT: 151.44 LBS | HEIGHT: 63 IN | DIASTOLIC BLOOD PRESSURE: 64 MMHG | BODY MASS INDEX: 26.83 KG/M2 | SYSTOLIC BLOOD PRESSURE: 118 MMHG

## 2025-02-10 DIAGNOSIS — J34.89 NASAL SEPTAL SPUR: ICD-10-CM

## 2025-02-10 DIAGNOSIS — R05.8 OTHER COUGH: ICD-10-CM

## 2025-02-10 DIAGNOSIS — J30.2 SEASONAL ALLERGIC RHINITIS, UNSPECIFIED TRIGGER: ICD-10-CM

## 2025-02-10 DIAGNOSIS — H69.93 DYSFUNCTION OF BOTH EUSTACHIAN TUBES: ICD-10-CM

## 2025-02-10 DIAGNOSIS — R07.0 THROAT PAIN IN ADULT: Primary | ICD-10-CM

## 2025-02-10 DIAGNOSIS — J34.3 HYPERTROPHY OF INFERIOR NASAL TURBINATE: ICD-10-CM

## 2025-02-10 DIAGNOSIS — J38.7 LARYNGEAL CYST: ICD-10-CM

## 2025-02-10 PROCEDURE — 99215 OFFICE O/P EST HI 40 MIN: CPT | Mod: 25,S$GLB,, | Performed by: OTOLARYNGOLOGY

## 2025-02-10 PROCEDURE — 3008F BODY MASS INDEX DOCD: CPT | Mod: CPTII,S$GLB,, | Performed by: OTOLARYNGOLOGY

## 2025-02-10 PROCEDURE — 3074F SYST BP LT 130 MM HG: CPT | Mod: CPTII,S$GLB,, | Performed by: OTOLARYNGOLOGY

## 2025-02-10 PROCEDURE — 3078F DIAST BP <80 MM HG: CPT | Mod: CPTII,S$GLB,, | Performed by: OTOLARYNGOLOGY

## 2025-02-10 PROCEDURE — 31575 DIAGNOSTIC LARYNGOSCOPY: CPT | Mod: S$GLB,,, | Performed by: OTOLARYNGOLOGY

## 2025-02-10 PROCEDURE — 1159F MED LIST DOCD IN RCRD: CPT | Mod: CPTII,S$GLB,, | Performed by: OTOLARYNGOLOGY

## 2025-02-10 RX ORDER — AZELASTINE 1 MG/ML
1 SPRAY, METERED NASAL 2 TIMES DAILY
Qty: 30 ML | Refills: 3 | Status: SHIPPED | OUTPATIENT
Start: 2025-02-10

## 2025-02-10 RX ORDER — DIPHENHYDRAMINE HCL 50 MG
CAPSULE ORAL
Qty: 1 CAPSULE | Refills: 0 | Status: SHIPPED | OUTPATIENT
Start: 2025-02-10

## 2025-02-10 RX ORDER — FLUTICASONE PROPIONATE 50 MCG
2 SPRAY, SUSPENSION (ML) NASAL 2 TIMES DAILY
Qty: 16 G | Refills: 3 | Status: SHIPPED | OUTPATIENT
Start: 2025-02-10

## 2025-02-10 RX ORDER — PREDNISONE 50 MG/1
TABLET ORAL
Qty: 3 TABLET | Refills: 0 | Status: SHIPPED | OUTPATIENT
Start: 2025-02-10

## 2025-02-10 RX ORDER — DESLORATADINE 5 MG/1
5 TABLET ORAL DAILY
Qty: 30 TABLET | Refills: 3 | Status: SHIPPED | OUTPATIENT
Start: 2025-02-10 | End: 2026-02-10

## 2025-02-10 NOTE — PATIENT INSTRUCTIONS
Information and instructions from your visit with me today:  Always use saline every time before a medication spray. You can also use saline on its own. If you are using saline and/or the medication sprays on an as needed basis and you have symptoms use the regimen daily for at least 2 weeks. You can use the flonase and astelin together, or if you prefer to start with just one medication spray, the flonase works better by itself compared to astelin by itself. You can try doing the saline and flonase and if still congested, add on the astelin again doing this regimen daily for up to two weeks when congestion. There may be times of the year that you only need saline and there may be times of the year that you need saline, flonase and astelin to control symptoms.     Start using the following medication nasal sprays:   Fluticasone spray:    This medication is a steroid spray. It stays within the nose and does not have absorption into the body that leads to side effects that one has with oral steroid medication. Fluticasone nasal spray is the same as the Flonase brand nasal spray. Discuss with your pharmacist if the price is lower over the counter or with a prescription ( this varies depending on insurance). The medication that is over the counter is the same as the prescription medication. Use this medication as instructed on the prescription, 1-2 sprays on each side of your nose twice daily.     Azelastine  spray:  This medication is an antihistamine used to treat nasal symptoms of allergy, which works specifically in the nose unlike antihistamine pills which have more of an effect on the whole body. Use this medication as instructed on the prescription, 1 spray on each side of your nose twice daily.     Additional instructions for medication sprays  Place the tip of the medication bottle in your nose and aim slightly up and out on each side to get medication high and deep into your nose and sinuses, and not have it  "all deposit in the very front of your nose. Aim the tip of the nozzle towards the outer corner of your eye . You can imagine aiming towards the back of your eyeball on each side for this, as opposed to straight back to the center of your nose and head.     You need to use this medication every day regardless of symptoms, as it takes time ( a few weeks) to work and get the benefits. It does not work on an "as needed" basis like taking a decongestant. If your symptoms only occur in a particular season, then the medication can be used seasonally instead of year long. For seasonal symptoms, you should start using the spray twice daily a month before when you normally have symptoms ( for example, if symptoms start in August, should start at the end of June).     Start nasal irrigations with saline solution- you can either use a rinse or a mist spray:    NASAL SALINE SPRAY ( simply saline and arm and hammer are examples) There are several different brands found in the cold and flu aisle of the pharmacy. You can use any brand of saline spray - this will deliver the saline by a gentle mist ( if you have difficulty or discomfort with nasal rinse/ a lot of fluid in the nose, this will be more comfortable).       Always rinse your nose with saline prior to using medication sprays and wait a couple of hours before using again. You can use the saline throughout the day to help with stuffy nose or dry nose.    Do not use nasal decongestant sprays such as Afrin or similar products long term ( over 3 days) .  This can cause long term physical nasal addiction. Afrin should only be used if having nose bleeds, severe nasal congestion , or severe ear pain/fullness and should not be used for more than 2-3 days in a row . It is a not a medication that should be used for a long period of time.     It was nice meeting you today, and I look forward to helping you feel better soon. Please don't hesitate to call if you have any other " questions or concerns, or if I can be of any assistance in the meantime.      Shabana Hanna MD    [facility]     Phone  [phone]    Fax      [phone]        Shabana Hanna MD  Otorhinolaryngology

## 2025-02-10 NOTE — PROGRESS NOTES
OTOLARYNGOLOGY CLINIC NOTE  Date:  02/10/2025     Chief complaint:  Chief Complaint   Patient presents with    Sore Throat     Throat pain started about 2 weeks ago, stood for a few days. Sometime swallowing is hard when throat hurts.    Nasal Congestion     Gets runny nose       History of Present Illness  Phoenix Jones is a 34 y.o. male  presenting today for a followup.  Catie interpretor gianna [phone]; also here with friend    I have never seen the patient before. He was seen by Dr. Sierra ([facility]) in may of 2024. Note in epic reviewed.     Started 2 weeks ago - thinks had a cold and fever and also had a dry cough after that felt pain in the throat . Since then feels discomfort in throat. Feels like something is stuck in the throat almost like a fishbone and hurts when he swallows does not feel it when not swallowing food/eating. Did not eat fish recently or any bony food    Occasional ringing in ear but no pain lasts less than a couple of minutes    Has history of rhinitis and seasonal allergies for a few years. Not sure specific seasons but when comes up but when does comes up it lasts for a month or so     Had coughed up some yellow phlegm as well     Has never had allergy testing     Not using flonase     Has not tried any medications other than some herbs    Past Medical History  Past Medical History:   Diagnosis Date    Bicuspid aortic valve 04/02/2024 2-2021  The left ventricle is normal in size with normal systolic function. The estimated ejection fraction is 60%  Normal right ventricular size with normal right ventricular systolic function.  Normal left ventricular diastolic function.  Bicuspid aortic valve with normal leaflet mobility and mild aortic regurgitation.  The estimated PA systolic pressure is 39 mmHg.  Normal central venous pressu    Chronic allergic rhinitis due to pollen 04/02/2024    Chronic superficial gastritis without bleeding 08/22/2023    EGD 5-  - Normal esophagus.    -  Gastritis. Biopsied.   - Duodenal erosions without bleeding. Biopsied.   The biopsies show some chronic inflammation of the stomach, which is likely from the Hpylori that was treated IN THE PAST. There is NO evidence of ongoing Hpylori infection (no ACTIVE infection, this is both by biopsy as well as the negative stool test months ago)      H. pylori infection 04/29/2024    Re treatment 4-2024 Quadruple therapy      Hepatic steatosis 08/22/2023    3-2021 US - mild hepatic steatosis    History of colon polyps 02/05/2025 2-5-2025   - One 7 mm polyp in the sigmoid colon, removed                          with a hot snare. Resected and retrieved.                          - The examination was otherwise normal on direct                          and retroflexion views.       History of Helicobacter pylori infection     Treated    KASH (obstructive sleep apnea) 03/11/2021    Vitamin D deficiency 12/06/2020        Past Surgical History  Past Surgical History:   Procedure Laterality Date    COLONOSCOPY N/A 2/5/2025    Procedure: COLONOSCOPY;  Surgeon: Grzegorz Nuñez MD;  Location: [facility] ENDOSCOPY;  Service: Endoscopy;  Laterality: N/A;    ESOPHAGOGASTRODUODENOSCOPY  05/12/2021    ESOPHAGOGASTRODUODENOSCOPY N/A 5/12/2021    Procedure: EGD (ESOPHAGOGASTRODUODENOSCOPY) Covid test 5/9/21;  Surgeon: Emerson Fontaine MD;  Location: [facility];  Service: Endoscopy;  Laterality: N/A;  with G / D biopsies    ESOPHAGOGASTRODUODENOSCOPY N/A 2/5/2025    Procedure: EGD (ESOPHAGOGASTRODUODENOSCOPY);  Surgeon: Grzegorz Nuñez MD;  Location: [facility] ENDOSCOPY;  Service: Endoscopy;  Laterality: N/A;  needs Danita / ref TAMMY Alaniz/ suprep inst handed to pt-RB  1/29/25- lvm via language line  and sent portal msg for pc. DBM  1/30 Pre-call complete-Asul  2.3 precall complete; AP  2/4-suprep resent to pharmacy-\[facility] [place] [facility]\""        Medications  Current Outpatient Medications on File Prior to Visit   Medication Sig Dispense Refill     ergocalciferol (ERGOCALCIFEROL) 50,000 unit Cap Take 1 capsule (50,000 Units total) by mouth every 7 days. 12 capsule 3    fluticasone propionate (FLONASE) 50 mcg/actuation nasal spray 1 spray (50 mcg total) by Each Nostril route once daily. 16 g 11    hydrocortisone 2.5 % cream Apply topically 2 (two) times daily. for 7 days 20 g 1    omega 3-dha-epa-fish oil 1,200 (144-216) mg Cap Take 2 capsules by mouth once daily.      pantoprazole (PROTONIX) 40 MG tablet Take 1 tablet (40 mg total) by mouth once daily. 30 tablet 11    vitamin E 400 UNIT capsule Take 1 capsule (400 Units total) by mouth once daily.      ZOLMitriptan (ZOMIG) 2.5 mg Spry 1 spray by Nasal route every 12 (twelve) hours as needed (Migraine). 6 each 11     No current facility-administered medications on file prior to visit.       Review of Systems  Review of Systems   Constitutional:  Positive for fever and malaise/fatigue.   HENT:  Positive for sore throat.    Eyes: Negative.    Respiratory:  Positive for shortness of breath.    Cardiovascular: Negative.    Gastrointestinal:  Positive for abdominal pain and constipation.   Genitourinary: Negative.    Musculoskeletal: Negative.    Skin: Negative.    Neurological:  Positive for headaches.   Psychiatric/Behavioral: Negative.      Answers submitted by the patient for this visit:  Review of Symptoms Questionnaire  (Submitted on 2/10/2025)  mouth sores: Yes  trouble swallowing: Yes  Snoring?: Yes  Acid Reflux?: Yes    Social History   reports that he has quit smoking. He has never used smokeless tobacco. He reports that he does not drink alcohol and does not use drugs.     Family History  Family History   Problem Relation Name Age of Onset    No Known Problems Mother      No Known Problems Father      No Known Problems Sister      Thyroid disease Daughter          Physical Exam   Vitals:    02/10/25 0836   BP: 118/64    Body mass index is 26.83 kg/m².            GENERAL: no acute distress.  HEAD:  normocephalic.   EYES: No scleral icterus  EARS: external ear without lesion, normal pinna shape and position.  External auditory canal with normal cerumen, tympanic membrane fully visible, no perforation , mild retraction bilateral No overt middle ear effusion- possible subtle fluid bilaterally. Ossicles intact.   NOSE: external nose without significant bony abnormality  ORAL CAVITY/OROPHARYNX: tongue mobile.   NECK: trachea midline.   LYMPH NODES:No cervical lymphadenopathy.  RESPIRATORY: no stridor, no stertor. Voice normal. Respirations nonlabored.  NEURO: alert, responds to questions appropriately.    PSYCH:mood appropriate    PROCEDURE NOTE  NAME OF PROCEDURE: Flexible Laryngoscopy, diagnostic  INDICATIONS: gag reflex precludes mirror exam, throat pain in adult   FINDINGS: smooth walled nodule alongside left arytenoid-suspect cyst, turbinate hypertrophy, spur of right septum     Consent: After procedure was explained in detail and all questions answered, verbal consent was obtained for performing flexible laryngoscopy.  Anesthesia: topical 4% lidocaine and neosynephrine  Procedure: With patient in seated position, the scope was inserted into the bilateral nasal passageway and advanced atraumatically into the nasopharynx to examine the following structures:  Nasal cavity: Turbinates with  hypertrophy. no middle meatal edema. No purulent drainage.   Nasopharynx: no mass or lesion noted in nasopharynx.   Oropharynx: base of tongue without  mass or ulceration. Lingual tonsils normal in appearance  Hypopharynx: posterior pharyngeal wall without mass or lesion. No pooling of secretions. Pyriform sinuses visible without mass or lesion  Larynx: epiglottis normal without lesion. False vocal folds without edema/erythema/lesion. True vocal folds mobile and without lesion. Mild interarytenoid edema no erythema . Postcricoid region with mild edema no lesion smooth walled nodule along arytenoid laterally   Subglottis:  "visualized portion of subglottis normal in appearance    After examination performed, the scope was removed atraumatically . The patient tolerated the procedure well. Photodocumentation obtained with representative images below, all images and/or videos uploaded in media section of epic.                                              Imaging:  The patient does have any new imaging of the head and neck since last visit.   Ultrasound 1-24-25 images and report personally reviewed and reviewed with patient. Radiology report in quotations below  "Right neck: 0.9 x 0.2 x 1.9 cm normal appearing node (R2).  0.8 x 0.7 x 0.4 cm node right neck region (R3).     Left neck: 0.9 x 0.6 x 1.1 cm node in the left neck region (L2).  0.9 x 0.7 x 1.0 cm node left neck region (L2).  0.8 x 0.7 x 1.4 cm node left neck region (L3).  0.9 x 0.3 x 1.3 cm left neck region (L3).     Impression:     Normal size nodes in the bilateral neck region. One of the right submandibular node is smaller compared to 06/10/2024 exam.  These are favored to be reactive. "      Slightly rounded shape, fatty hilum appears maintained and not pathologically enlarged    Preserved fatty hilum    Labs:  CBC  Recent Labs   Lab 04/02/24  1021 09/08/24  1622 01/20/25  1155 01/20/25  1200   WBC 6.67 4.78 6.51  --    Hemoglobin 14.2 14.1 14.3  --    POC Hematocrit  --   --   --  43   Hematocrit 43.1 42.1 43.5  --    MCV 90 87 88  --    Platelets 270 227 247  --      BMP  Recent Labs   Lab 04/02/24  1021 09/08/24  1622 01/20/25  1155   Glucose 84 128 H 78   Sodium 140 141 135 L   Potassium 3.9 3.2 L 3.8   Chloride 105 103 102   CO2 25 28 25   BUN 18 10 14   Creatinine 0.9 0.9 0.7   Calcium 9.6 9.3 9.3     COAGS        Assessment  1. Throat pain in adult    2. Laryngeal cyst  - CT Soft Tissue Neck With Contrast; Future  - CREATININE, SERUM; Future    3. Seasonal allergic rhinitis, unspecified trigger    4. Other cough    5. Dysfunction of both eustachian tubes    6. Nasal " septal spur    7. Hypertrophy of inferior nasal turbinate       Plan:  Discussed plan of care with patient in detail and all questions answered. Patient reported understanding of plan of care. I gave the patient the opportunity to ask questions and patient confirmed all questions answered to satisfaction.   Allergic rhinitis(AR):  discussed about pathophysiology of allergic disease and sinus disease. We discussed about treatment options for this including but not limited to medications and potential surgeries as well as when surgery is indicated.  - I recommend dual nasal spray therapy as combo of steroid and antihistamine nasal spray has been shown in evidence based studies to be better than either alone.   -Discussed medication administration technique ( point toward outer corner of eye and not towards nasal septum) and use nasal saline prior to medication sprays.   -We discussed importance of saline use prior to medication sprays to help sprays work better and to clean the nose.   -Counseled on risk of bleeding, risk of increased intraocular pressure/cataract with long term use.   -Discussed how to increase and decrease nasal spray regimen based on symptoms and that sprays can be used on prn basis but work best when used daily and take about 2-3 weeks to get to max level . If patient using sprays on a prn basis and symptoms not controlled should go back to daily /bid use  -discussed as well as gave patient physical documentation with photos  about the saline and other medication sprays ( paperwork summarized discussion topics)    Cough: suspect related to above, if not resolving as would be expected would recommend cxr    Throat pain in adult: do not think pain from cyst and suspect cyst given appearance. Suspect irritation from postnasal drip ; also could be silent lpr . If pain persists would rec DL(see below)    Laryngeal cyst:  Will get ct however for further eval. May need biopsy if enlarging or throat pain  not resolving or abnormality on ct scan , favor cyst     Eustachian tube dysfunction (ETD) : ETD can be idiopathic, due to anatomic obstruction,  or caused by  Inflammation from either allergic rhinitis (AR ) or laryngopharyngo reflux (LPR). Sometimes this can lead to development of a middle ear effusion (GHULAM) which may or may not get secondarily infected. Usually the fluid will resolve without intervention however in some cases it can take 8-12 weeks for fluid to resolve completely. Occasionally a tympanostomy tube (PET) needs to be placed for this ETD treatment in certain conditions (persistent GHULAM >2-3 months or if severe pain associated with or without GHULAM, significant retraction of tympanic membrane that appears to be starting to cause conductive hearing changes). In addition to treatment trials for either AR or LPR, the patient can gently autoinsufflate daily to intermittently equalize middle ear pressure . If unsuccessful, pt should not  continue with more frequent or more forceful attempts with this maneuver. Intermittent use of  afrin can also help however I advise to only use if having  severe pain given risk of rhinitis medicamentosa (pt counseled extensively about  risk of physical addiction and not to use for prolonged time period).      Would need audiogram at follow up if not improving. Tinnitus description sounds physiologic suspect related to etd    F/u 6 weeks with flex, counseled to notify for immediate apptmt if pain worsening or other symptoms such as hemoptysis/voice changes     I spent a total of 40 minutes on the day of the visit (this excludes time for procedure which was additional time beyond the 40 minutes for total time of 45 minutes).  This includes face to face time and non-face to face time preparing to see the patient (eg, review of tests), obtaining and/or reviewing separately obtained history, documenting clinical information in the electronic or other health record, independently  interpreting results and communicating results to the patient/family/caregiver, or care coordinator.   Please be aware that this note has been generated with the assistance of [employer] voice-to-text.  Please excuse any spelling or grammatical errors.

## 2025-02-12 ENCOUNTER — PATIENT MESSAGE (OUTPATIENT)
Dept: OTOLARYNGOLOGY | Facility: CLINIC | Age: 35
End: 2025-02-12
Payer: COMMERCIAL

## 2025-02-12 ENCOUNTER — OFFICE VISIT (OUTPATIENT)
Dept: OTOLARYNGOLOGY | Facility: CLINIC | Age: 35
End: 2025-02-12
Payer: COMMERCIAL

## 2025-02-12 DIAGNOSIS — R07.0 THROAT PAIN IN ADULT: Primary | ICD-10-CM

## 2025-02-12 LAB
FINAL PATHOLOGIC DIAGNOSIS: NORMAL
GROSS: NORMAL
Lab: NORMAL
MICROSCOPIC EXAM: NORMAL

## 2025-02-12 PROCEDURE — 99999 PR PBB SHADOW E&M-EST. PATIENT-LVL III: CPT | Mod: PBBFAC,,, | Performed by: PHYSICIAN ASSISTANT

## 2025-02-12 PROCEDURE — 1159F MED LIST DOCD IN RCRD: CPT | Mod: CPTII,S$GLB,, | Performed by: PHYSICIAN ASSISTANT

## 2025-02-12 PROCEDURE — 99213 OFFICE O/P EST LOW 20 MIN: CPT | Mod: S$GLB,,, | Performed by: PHYSICIAN ASSISTANT

## 2025-02-12 PROCEDURE — 1160F RVW MEDS BY RX/DR IN RCRD: CPT | Mod: CPTII,S$GLB,, | Performed by: PHYSICIAN ASSISTANT

## 2025-02-12 NOTE — PROGRESS NOTES
Chief Complaint   Patient presents with    Dysphagia    Sore Throat         34 y.o. male presents for evaluation of sore throat.  He was sick a few weeks ago but then improved.  2 days ago - sneezed in the car and developed immediate sore throat/throat pain.  He was seen 2 days ago in the ENT clinic by Dr Hanna.  He has no new complaints and seems to just want clarification on the plan/evaluation. He has been using nasal sprays.   He is prescribed protonix but not taking it. He has not taken any OTC pain medication.     Chart Review  2/10/25 - evaluated by ENT, Dr Hanna. Flexible laryngoscopy done. CT ordered. Given azestaline, desloratadine, flonase. Given benadryl and prednisone to premedicate for CT.     Review of Systems   HENT: Per HPI      Past Medical History:   Diagnosis Date    Bicuspid aortic valve 04/02/2024 2-2021  The left ventricle is normal in size with normal systolic function. The estimated ejection fraction is 60%  Normal right ventricular size with normal right ventricular systolic function.  Normal left ventricular diastolic function.  Bicuspid aortic valve with normal leaflet mobility and mild aortic regurgitation.  The estimated PA systolic pressure is 39 mmHg.  Normal central venous pressu    Chronic allergic rhinitis due to pollen 04/02/2024    Chronic superficial gastritis without bleeding 08/22/2023    EGD 5-  - Normal esophagus.    - Gastritis. Biopsied.   - Duodenal erosions without bleeding. Biopsied.   The biopsies show some chronic inflammation of the stomach, which is likely from the Hpylori that was treated IN THE PAST. There is NO evidence of ongoing Hpylori infection (no ACTIVE infection, this is both by biopsy as well as the negative stool test months ago)      H. pylori infection 04/29/2024    Re treatment 4-2024 Quadruple therapy      Hepatic steatosis 08/22/2023    3-2021 US - mild hepatic steatosis    History of colon polyps 02/05/2025    2-5-2025   - One 7  mm polyp in the sigmoid colon, removed                          with a hot snare. Resected and retrieved.                          - The examination was otherwise normal on direct                          and retroflexion views.       History of Helicobacter pylori infection     Treated    KASH (obstructive sleep apnea) 03/11/2021    Vitamin D deficiency 12/06/2020       Past Surgical History:   Procedure Laterality Date    COLONOSCOPY N/A 2/5/2025    Procedure: COLONOSCOPY;  Surgeon: Grzegorz Nuñez MD;  Location: [facility] ENDOSCOPY;  Service: Endoscopy;  Laterality: N/A;    ESOPHAGOGASTRODUODENOSCOPY  05/12/2021    ESOPHAGOGASTRODUODENOSCOPY N/A 5/12/2021    Procedure: EGD (ESOPHAGOGASTRODUODENOSCOPY) Covid test 5/9/21;  Surgeon: Emerson Fontaine MD;  Location: [facility];  Service: Endoscopy;  Laterality: N/A;  with G / D biopsies    ESOPHAGOGASTRODUODENOSCOPY N/A 2/5/2025    Procedure: EGD (ESOPHAGOGASTRODUODENOSCOPY);  Surgeon: Grzegorz Nuñez MD;  Location: [facility] ENDOSCOPY;  Service: Endoscopy;  Laterality: N/A;  needs Danita / ref TAMMY Alaniz/ suprep inst handed to pt-RB  1/29/25- m via language line  and sent portal msg for pc. DBM  1/30 Pre-call complete-Asul  2.3 precall complete; AP  2/4-suprep resent to pharmacy-[facility]       family history includes No Known Problems in his father, mother, and sister; Thyroid disease in his daughter.    Pt  reports that he has quit smoking. His smoking use included cigarettes. He has never used smokeless tobacco. He reports that he does not drink alcohol and does not use drugs.    Review of patient's allergies indicates:   Allergen Reactions    Augmentin [amoxicillin-pot clavulanate] Rash    Iodinated contrast media Hives        Physical Exam    There were no vitals filed for this visit.  There is no height or weight on file to calculate BMI.    General: AOx3, NAD. Voice clear.  Right Ear: External Auditory Canal WNL  Left Ear:  External Auditory Canal  WNL  Neck: No neck swelling.   Face: House Brackmann I bilaterally.  Eyes: Normal extra ocular motion bilaterally.    Flexible laryngoscopy performed on 2/10/25 by Dr Hanna. See note for details.       Assessment     1. Throat pain in adult          Plan    Problem List Items Addressed This Visit    None  Visit Diagnoses       Throat pain in adult    -  Primary          Explained in detail Dr Hanna's plan and workup. Reassured him that scope exam done 2 days ago went down to his vocal cords.   Continue with scheduled CT scan. Continue all nasal sprays prescribed by Dr Hanna.  Recommend starting prescribed protonix. Can take OTC tylenol as needed for pain.   Follow up as scheduled with Dr Hanna.  All questions answered.

## 2025-02-17 ENCOUNTER — RESULTS FOLLOW-UP (OUTPATIENT)
Dept: GASTROENTEROLOGY | Facility: CLINIC | Age: 35
End: 2025-02-17

## 2025-02-17 ENCOUNTER — OFFICE VISIT (OUTPATIENT)
Dept: GASTROENTEROLOGY | Facility: CLINIC | Age: 35
End: 2025-02-17
Payer: COMMERCIAL

## 2025-02-17 VITALS
WEIGHT: 151.44 LBS | DIASTOLIC BLOOD PRESSURE: 76 MMHG | HEART RATE: 87 BPM | BODY MASS INDEX: 26.83 KG/M2 | SYSTOLIC BLOOD PRESSURE: 133 MMHG

## 2025-02-17 DIAGNOSIS — R10.9 ABDOMINAL PAIN, UNSPECIFIED ABDOMINAL LOCATION: Primary | ICD-10-CM

## 2025-02-17 DIAGNOSIS — J30.2 SEASONAL ALLERGIC RHINITIS, UNSPECIFIED TRIGGER: Primary | ICD-10-CM

## 2025-02-17 RX ORDER — FLUTICASONE PROPIONATE 50 MCG
SPRAY, SUSPENSION (ML) NASAL
Qty: 96 ML | Refills: 4 | Status: SHIPPED | OUTPATIENT
Start: 2025-02-17

## 2025-02-17 NOTE — PROGRESS NOTES
[facility] Follow-UP Note    Reason for Follow-Up:  follow up after endoscopy     PCP:   Arpan Bowens       HPI:  This is a 34 y.o. male last seen in GI endoscopy for his EGD and cscope earlier this month. He is following up today to discuss results. He was offered a  but felt this was not needed. He had a history of migraines, bicuspid aortic valve, vitamin D deficiency, H. Pylori, KASH. He had CT scan in January which was concerning for acute appendicitis and constipation. A follow up CT a few weeks later appeared improved. He was seen by surgery and was treated with abx for appendicitis. The pain he was having in the RLQ has improved. He then had an EGD showed mild gastritis. Biopsies were HP negative and negative for celiac disease. 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 He is feeling well since that time. He takes PPI daily and reflux is controlled. He does have some pain with swallowing and is planned for CT neck this month and has also seen ENT for this.         Objective Findings:    Vital Signs:  /76   Pulse 87   Wt 68.7 kg (151 lb 7.3 oz)   BMI 26.83 kg/m²   Body mass index is 26.83 kg/m².    Physical Exam:  General Appearance: Well appearing in no acute distress  Head:   Normocephalic, without obvious abnormality  Eyes:    No scleral icterus    Lungs: CTA bilaterally in anterior and posterior fields   Heart:  Regular rate and rhythm, no murmurs heard  Abdomen: Soft, non tender, non distended with positive bowel sounds in all four quadrants.        Imaging:    CT 2/2025     Impression:     No significant change in the diameter of the appendix which remains dilated however there has been improvement of surrounding inflammatory changes.  Recommend continued clinical correlation for appendicitis.     Interval resolution of large stool burden in the rectal vault.    CT 1/2024     Impression:     This report was flagged in Epic as  abnormal.     1. Findings most consistent with acute appendicitis.  No abscess or free air at this time.  2. Large amount of stool in the rectum suggests constipation.  3. Please see above for several additional findings.     Endoscopy:      EGD   The first portion of the duodenum and second portion of the duodenum        were normal. Biopsies for histology were taken with a cold forceps        for evaluation of celiac disease. Estimated blood loss was minimal.        The entire examined stomach was normal. Biopsies were taken with a        cold forceps for Helicobacter pylori testing. Estimated blood loss        was minimal.        Islands of salmon-colored mucosa were present at 37 cm. The maximum        longitudinal extent of these esophageal mucosal changes was 3-4 mm        in length. Biopsies were taken with a cold forceps for histology.        Estimated blood loss was minimal.        The exam was otherwise without abnormality.     Cscope     A 7 mm polyp was found in the sigmoid colon. The polyp was        semi-sessile. The polyp was removed with a hot snare. Resection and        retrieval were complete. Estimated blood loss was minimal.        The exam was otherwise without abnormality on direct and        retroflexion views.     1. Duodenum, 2nd part, biopsy:   Duodenal mucosa with no diagnostic abnormality.   Villous architecture is preserved with no intraepithelial lymphocytosis.     2. Duodenum, bulb, biopsy:   Duodenal mucosa with no diagnostic abnormality.   Villous architecture is preserved with no intraepithelial lymphocytosis.     3. Stomach, antrum and body, biopsy:   Gastric antral and oxyntic mucosa with mild chronic inactive gastritis.   Helicobacter pylori immunostain is negative.     4. Esophagus, lower, biopsy:   Esophageal squamous mucosa with reactive changes and rare intraepithelial eosinophils.   Compatible with reflux esophagitis.   Gastric cardia-type mucosa, acutely and chronically  inflamed.   Negative for intestinal metaplasia and dysplasia.     5. Sigmoid colon, polyp, polypectomy:Submucosal leiomyoma.       Assessment:    Reflux   Abdominal pain, improved   Appendicitis     Patient with acute appendicitis which was managed conservatively. A recent cscope was normal except one sigmoid benign leiomyoma which was removed. EGD biopsies were negative for HP and showed no signs of celiac disease or nelson's esophagus. Continue PPI. He is being worked up for sore throat issue with ENT with CT neck upcoming.      Recommendations:    - Continue PPI, recommend once daily for 2 months   - If reflux controlled, can try to taper off at that time and use pepcid PRN   - Repeat cscope in 10 years as polyp was leiomyoma      RTC PRN       Order summary:         Thank you so much for allowing me to participate in the care of Phoenix Kelly MD  Gastroenterology   [facility]

## 2025-02-18 ENCOUNTER — HOSPITAL ENCOUNTER (OUTPATIENT)
Dept: RADIOLOGY | Facility: HOSPITAL | Age: 35
Discharge: HOME | End: 2025-02-18
Attending: OTOLARYNGOLOGY
Payer: COMMERCIAL

## 2025-02-18 DIAGNOSIS — J38.7 LARYNGEAL CYST: ICD-10-CM

## 2025-02-18 PROCEDURE — 25500020 PHARM REV CODE 255: Performed by: OTOLARYNGOLOGY

## 2025-02-18 PROCEDURE — 70491 CT SOFT TISSUE NECK W/DYE: CPT | Mod: TC

## 2025-02-18 PROCEDURE — 70491 CT SOFT TISSUE NECK W/DYE: CPT | Mod: 26,,, | Performed by: RADIOLOGY

## 2025-02-18 RX ADMIN — IOHEXOL 75 ML: 350 INJECTION, SOLUTION INTRAVENOUS at 09:02

## 2025-03-25 ENCOUNTER — PATIENT MESSAGE (OUTPATIENT)
Dept: OTOLARYNGOLOGY | Facility: CLINIC | Age: 35
End: 2025-03-25
Payer: COMMERCIAL

## 2025-04-04 ENCOUNTER — TELEPHONE (OUTPATIENT)
Dept: OTOLARYNGOLOGY | Facility: CLINIC | Age: 35
End: 2025-04-04
Payer: COMMERCIAL